# Patient Record
Sex: FEMALE | Race: WHITE | Employment: OTHER | URBAN - METROPOLITAN AREA
[De-identification: names, ages, dates, MRNs, and addresses within clinical notes are randomized per-mention and may not be internally consistent; named-entity substitution may affect disease eponyms.]

---

## 2017-01-23 ENCOUNTER — OFFICE VISIT (OUTPATIENT)
Dept: FAMILY MEDICINE CLINIC | Age: 62
End: 2017-01-23

## 2017-01-23 VITALS
WEIGHT: 279.6 LBS | DIASTOLIC BLOOD PRESSURE: 64 MMHG | BODY MASS INDEX: 46.58 KG/M2 | HEIGHT: 65 IN | RESPIRATION RATE: 16 BRPM | HEART RATE: 60 BPM | SYSTOLIC BLOOD PRESSURE: 112 MMHG

## 2017-01-23 DIAGNOSIS — E78.5 HYPERLIPIDEMIA LDL GOAL <70: ICD-10-CM

## 2017-01-23 DIAGNOSIS — E03.9 HYPOTHYROIDISM, UNSPECIFIED TYPE: ICD-10-CM

## 2017-01-23 DIAGNOSIS — E66.01 MORBID OBESITY WITH BMI OF 45.0-49.9, ADULT (HCC): ICD-10-CM

## 2017-01-23 DIAGNOSIS — R06.02 SOB (SHORTNESS OF BREATH) ON EXERTION: ICD-10-CM

## 2017-01-23 DIAGNOSIS — E66.01 MORBID OBESITY, UNSPECIFIED OBESITY TYPE (HCC): ICD-10-CM

## 2017-01-23 DIAGNOSIS — R73.01 IFG (IMPAIRED FASTING GLUCOSE): ICD-10-CM

## 2017-01-23 DIAGNOSIS — E55.9 VITAMIN D DEFICIENCY: ICD-10-CM

## 2017-01-23 DIAGNOSIS — M51.36 DDD (DEGENERATIVE DISC DISEASE), LUMBAR: ICD-10-CM

## 2017-01-23 DIAGNOSIS — I25.10 CORONARY ARTERY DISEASE INVOLVING NATIVE CORONARY ARTERY OF NATIVE HEART WITHOUT ANGINA PECTORIS: Primary | ICD-10-CM

## 2017-01-23 DIAGNOSIS — Z11.59 NEED FOR HEPATITIS C SCREENING TEST: ICD-10-CM

## 2017-01-23 DIAGNOSIS — M25.50 POLYARTHRALGIA: ICD-10-CM

## 2017-01-23 DIAGNOSIS — K21.9 GASTROESOPHAGEAL REFLUX DISEASE, ESOPHAGITIS PRESENCE NOT SPECIFIED: ICD-10-CM

## 2017-01-23 DIAGNOSIS — K58.9 IRRITABLE BOWEL SYNDROME, UNSPECIFIED TYPE: ICD-10-CM

## 2017-01-23 DIAGNOSIS — K27.9 PUD (PEPTIC ULCER DISEASE): ICD-10-CM

## 2017-01-23 PROCEDURE — 3017F COLORECTAL CA SCREEN DOC REV: CPT | Performed by: FAMILY MEDICINE

## 2017-01-23 PROCEDURE — 1036F TOBACCO NON-USER: CPT | Performed by: FAMILY MEDICINE

## 2017-01-23 PROCEDURE — G8419 CALC BMI OUT NRM PARAM NOF/U: HCPCS | Performed by: FAMILY MEDICINE

## 2017-01-23 PROCEDURE — 99214 OFFICE O/P EST MOD 30 MIN: CPT | Performed by: FAMILY MEDICINE

## 2017-01-23 PROCEDURE — 3014F SCREEN MAMMO DOC REV: CPT | Performed by: FAMILY MEDICINE

## 2017-01-23 PROCEDURE — G8427 DOCREV CUR MEDS BY ELIG CLIN: HCPCS | Performed by: FAMILY MEDICINE

## 2017-01-23 PROCEDURE — G8484 FLU IMMUNIZE NO ADMIN: HCPCS | Performed by: FAMILY MEDICINE

## 2017-01-23 PROCEDURE — G8598 ASA/ANTIPLAT THER USED: HCPCS | Performed by: FAMILY MEDICINE

## 2017-01-23 RX ORDER — IBUPROFEN 600 MG/1
TABLET ORAL
Qty: 120 TABLET | Refills: 0 | Status: SHIPPED | OUTPATIENT
Start: 2017-01-23 | End: 2017-03-13 | Stop reason: SDUPTHER

## 2017-01-23 RX ORDER — OMEPRAZOLE 40 MG/1
CAPSULE, DELAYED RELEASE ORAL
Qty: 90 CAPSULE | Refills: 3 | Status: SHIPPED | OUTPATIENT
Start: 2017-01-23 | End: 2018-08-24 | Stop reason: SDUPTHER

## 2017-01-23 RX ORDER — LEVOTHYROXINE SODIUM 0.05 MG/1
TABLET ORAL
Qty: 90 TABLET | Refills: 3 | Status: SHIPPED | OUTPATIENT
Start: 2017-01-23 | End: 2019-08-05

## 2017-01-23 RX ORDER — ATORVASTATIN CALCIUM 40 MG/1
40 TABLET, FILM COATED ORAL DAILY
Qty: 90 TABLET | Refills: 3 | Status: SHIPPED | OUTPATIENT
Start: 2017-01-23 | End: 2018-03-17 | Stop reason: SDUPTHER

## 2017-01-23 ASSESSMENT — ENCOUNTER SYMPTOMS
GASTROINTESTINAL NEGATIVE: 1
BACK PAIN: 1
RESPIRATORY NEGATIVE: 1

## 2017-01-24 ENCOUNTER — TELEPHONE (OUTPATIENT)
Dept: FAMILY MEDICINE CLINIC | Age: 62
End: 2017-01-24

## 2017-01-24 RX ORDER — METHYLPREDNISOLONE 4 MG/1
TABLET ORAL
Qty: 1 KIT | Refills: 0 | Status: SHIPPED | OUTPATIENT
Start: 2017-01-24 | End: 2017-01-30

## 2017-01-30 LAB
ABSOLUTE BASO #: 0 K/UL (ref 0–0.1)
ABSOLUTE EOS #: 0.1 K/UL (ref 0.1–0.4)
ABSOLUTE LYMPH #: 2.6 K/UL (ref 0.8–5.2)
ABSOLUTE MONO #: 0.4 K/UL (ref 0.1–0.9)
ABSOLUTE NEUT #: 2.9 K/UL (ref 1.3–9.1)
ALBUMIN SERPL-MCNC: 3.9 G/DL (ref 3.2–5.3)
ALK PHOSPHATASE: 91 IU/L (ref 35–121)
ALT SERPL-CCNC: 22 IU/L (ref 5–59)
ANION GAP SERPL CALCULATED.3IONS-SCNC: 18 MMOL/L
AST SERPL-CCNC: 21 IU/L (ref 10–42)
BASOPHILS RELATIVE PERCENT: 0.7 % (ref 0–1)
BILIRUB SERPL-MCNC: 0.5 MG/DL (ref 0.2–1.3)
BUN BLDV-MCNC: 15 MG/DL (ref 10–20)
CALCIUM SERPL-MCNC: 9.2 MG/DL (ref 8.7–10.8)
CHLORIDE BLD-SCNC: 102 MMOL/L (ref 95–111)
CHOLESTEROL/HDL RATIO: 2.6
CHOLESTEROL: 157 MG/DL
CO2: 29 MMOL/L (ref 21–32)
CREAT SERPL-MCNC: 0.6 MG/DL (ref 0.5–1.3)
EGFR AFRICAN AMERICAN: 122
EGFR IF NONAFRICAN AMERICAN: 101
EOSINOPHILS RELATIVE PERCENT: 1.8 % (ref 1–4)
GLUCOSE: 86 MG/DL (ref 70–100)
HCT VFR BLD CALC: 42.4 % (ref 36–48)
HDLC SERPL-MCNC: 60 MG/DL (ref 40–60)
HEMOGLOBIN: 13.8 G/DL (ref 12–16)
LDL CHOLESTEROL CALCULATED: 81 MG/DL
LDL/HDL RATIO: 1.4
LYMPHOCYTE %: 42.9 % (ref 16–48)
MAGNESIUM: 2.1 MG/DL (ref 1.7–2.4)
MCH RBC QN AUTO: 28 PG (ref 27–34)
MCHC RBC AUTO-ENTMCNC: 32.5 G/DL (ref 31–36)
MCV RBC AUTO: 86.2 FL (ref 80–100)
MONOCYTES # BLD: 7.2 % (ref 1–8)
NEUTROPHILS RELATIVE PERCENT: 47.2 % (ref 45–75)
PDW BLD-RTO: 12.6 % (ref 10.8–14.8)
PLATELETS: 347 K/UL (ref 150–450)
POTASSIUM SERPL-SCNC: 4.2 MMOL/L (ref 3.5–5.4)
RBC: 4.92 M/UL (ref 4–5.5)
RHEUMATOID FACTOR: <11 IU/ML
SEDIMENTATION RATE, ERYTHROCYTE: 10 MM/HR (ref 0–30)
SODIUM BLD-SCNC: 145 MMOL/L (ref 134–147)
TOTAL PROTEIN: 7 G/DL (ref 5.8–8)
TRIGL SERPL-MCNC: 78 MG/DL
VLDLC SERPL CALC-MCNC: 16 MG/DL
WBC: 6.1 K/UL (ref 3.7–10.8)

## 2017-01-31 LAB
ANTI-NUCLEAR ANTIBODY (ANA): NORMAL
AVERAGE GLUCOSE: 105 MG/DL (ref 66–114)
HBA1C MFR BLD: 5.3 % (ref 4.2–5.8)
HEPATITIS C ANTIBODY: NEGATIVE
TSH SERPL DL<=0.05 MIU/L-ACNC: 3.98 UIU/ML (ref 0.4–4.4)

## 2017-02-01 LAB
CCP IGG ANTIBODIES: 3 UNITS
HIGH SENSITIVE C-REACTIVE PROTEIN: 2.6 MG/L
HLA B27: NORMAL
VITAMIN D 25-HYDROXY: 14 NG/ML

## 2017-02-20 ENCOUNTER — OFFICE VISIT (OUTPATIENT)
Dept: FAMILY MEDICINE CLINIC | Age: 62
End: 2017-02-20

## 2017-02-20 VITALS
DIASTOLIC BLOOD PRESSURE: 90 MMHG | RESPIRATION RATE: 20 BRPM | SYSTOLIC BLOOD PRESSURE: 132 MMHG | TEMPERATURE: 97.3 F | OXYGEN SATURATION: 97 % | HEIGHT: 64 IN | HEART RATE: 84 BPM | BODY MASS INDEX: 47.12 KG/M2 | WEIGHT: 276 LBS

## 2017-02-20 DIAGNOSIS — B34.9 VIRAL ILLNESS: Primary | ICD-10-CM

## 2017-02-20 DIAGNOSIS — Z20.828 EXPOSURE TO INFLUENZA: ICD-10-CM

## 2017-02-20 PROCEDURE — G8598 ASA/ANTIPLAT THER USED: HCPCS | Performed by: FAMILY MEDICINE

## 2017-02-20 PROCEDURE — G8484 FLU IMMUNIZE NO ADMIN: HCPCS | Performed by: FAMILY MEDICINE

## 2017-02-20 PROCEDURE — 1036F TOBACCO NON-USER: CPT | Performed by: FAMILY MEDICINE

## 2017-02-20 PROCEDURE — 99213 OFFICE O/P EST LOW 20 MIN: CPT | Performed by: FAMILY MEDICINE

## 2017-02-20 PROCEDURE — 3014F SCREEN MAMMO DOC REV: CPT | Performed by: FAMILY MEDICINE

## 2017-02-20 PROCEDURE — 3017F COLORECTAL CA SCREEN DOC REV: CPT | Performed by: FAMILY MEDICINE

## 2017-02-20 PROCEDURE — G8427 DOCREV CUR MEDS BY ELIG CLIN: HCPCS | Performed by: FAMILY MEDICINE

## 2017-02-20 PROCEDURE — G8417 CALC BMI ABV UP PARAM F/U: HCPCS | Performed by: FAMILY MEDICINE

## 2017-02-20 RX ORDER — OSELTAMIVIR PHOSPHATE 75 MG/1
75 CAPSULE ORAL 2 TIMES DAILY
Qty: 10 CAPSULE | Refills: 0 | Status: SHIPPED | OUTPATIENT
Start: 2017-02-20 | End: 2017-02-25

## 2017-02-20 ASSESSMENT — ENCOUNTER SYMPTOMS
GASTROINTESTINAL NEGATIVE: 1
RHINORRHEA: 1
COUGH: 1
SINUS PRESSURE: 1
SHORTNESS OF BREATH: 0
WHEEZING: 0

## 2017-02-21 ENCOUNTER — TELEPHONE (OUTPATIENT)
Dept: FAMILY MEDICINE CLINIC | Age: 62
End: 2017-02-21

## 2017-03-13 DIAGNOSIS — M25.50 POLYARTHRALGIA: ICD-10-CM

## 2017-03-13 RX ORDER — IBUPROFEN 600 MG/1
TABLET ORAL
Qty: 120 TABLET | Refills: 0 | Status: SHIPPED | OUTPATIENT
Start: 2017-03-13 | End: 2017-04-10 | Stop reason: SDUPTHER

## 2017-04-10 DIAGNOSIS — M25.50 POLYARTHRALGIA: ICD-10-CM

## 2017-04-11 RX ORDER — IBUPROFEN 600 MG/1
TABLET ORAL
Qty: 120 TABLET | Refills: 0 | Status: SHIPPED | OUTPATIENT
Start: 2017-04-11 | End: 2017-06-02 | Stop reason: SDUPTHER

## 2017-04-27 ENCOUNTER — TELEPHONE (OUTPATIENT)
Dept: FAMILY MEDICINE CLINIC | Age: 62
End: 2017-04-27

## 2017-06-02 DIAGNOSIS — M25.50 POLYARTHRALGIA: ICD-10-CM

## 2017-06-04 RX ORDER — IBUPROFEN 600 MG/1
TABLET ORAL
Qty: 360 TABLET | Refills: 0 | Status: SHIPPED | OUTPATIENT
Start: 2017-06-04 | End: 2017-11-03 | Stop reason: SDUPTHER

## 2017-07-16 ENCOUNTER — NURSE TRIAGE (OUTPATIENT)
Dept: ADMINISTRATIVE | Age: 62
End: 2017-07-16

## 2017-09-19 ENCOUNTER — TELEPHONE (OUTPATIENT)
Dept: FAMILY MEDICINE CLINIC | Age: 62
End: 2017-09-19

## 2017-11-03 DIAGNOSIS — M25.50 POLYARTHRALGIA: ICD-10-CM

## 2017-11-06 RX ORDER — IBUPROFEN 600 MG/1
TABLET ORAL
Qty: 360 TABLET | Refills: 0 | Status: SHIPPED | OUTPATIENT
Start: 2017-11-06 | End: 2018-02-21 | Stop reason: SDUPTHER

## 2017-12-05 ENCOUNTER — OFFICE VISIT (OUTPATIENT)
Dept: FAMILY MEDICINE CLINIC | Age: 62
End: 2017-12-05
Payer: MEDICARE

## 2017-12-05 VITALS
RESPIRATION RATE: 16 BRPM | HEIGHT: 66 IN | WEIGHT: 274.9 LBS | DIASTOLIC BLOOD PRESSURE: 78 MMHG | SYSTOLIC BLOOD PRESSURE: 130 MMHG | HEART RATE: 78 BPM | OXYGEN SATURATION: 94 % | TEMPERATURE: 98.3 F | BODY MASS INDEX: 44.18 KG/M2

## 2017-12-05 DIAGNOSIS — J40 BRONCHITIS: Primary | ICD-10-CM

## 2017-12-05 DIAGNOSIS — R05.9 COUGH: ICD-10-CM

## 2017-12-05 DIAGNOSIS — Z12.31 ENCOUNTER FOR SCREENING MAMMOGRAM FOR BREAST CANCER: ICD-10-CM

## 2017-12-05 PROCEDURE — G8417 CALC BMI ABV UP PARAM F/U: HCPCS | Performed by: NURSE PRACTITIONER

## 2017-12-05 PROCEDURE — 3017F COLORECTAL CA SCREEN DOC REV: CPT | Performed by: NURSE PRACTITIONER

## 2017-12-05 PROCEDURE — G8484 FLU IMMUNIZE NO ADMIN: HCPCS | Performed by: NURSE PRACTITIONER

## 2017-12-05 PROCEDURE — 3014F SCREEN MAMMO DOC REV: CPT | Performed by: NURSE PRACTITIONER

## 2017-12-05 PROCEDURE — G8598 ASA/ANTIPLAT THER USED: HCPCS | Performed by: NURSE PRACTITIONER

## 2017-12-05 PROCEDURE — 1036F TOBACCO NON-USER: CPT | Performed by: NURSE PRACTITIONER

## 2017-12-05 PROCEDURE — G8427 DOCREV CUR MEDS BY ELIG CLIN: HCPCS | Performed by: NURSE PRACTITIONER

## 2017-12-05 PROCEDURE — 99213 OFFICE O/P EST LOW 20 MIN: CPT | Performed by: NURSE PRACTITIONER

## 2017-12-05 RX ORDER — ALBUTEROL SULFATE 90 UG/1
2 AEROSOL, METERED RESPIRATORY (INHALATION) EVERY 6 HOURS PRN
Qty: 1 INHALER | Refills: 0 | Status: SHIPPED | OUTPATIENT
Start: 2017-12-05 | End: 2018-03-28

## 2017-12-05 RX ORDER — PREDNISONE 50 MG/1
50 TABLET ORAL DAILY
Qty: 4 TABLET | Refills: 0 | Status: SHIPPED | OUTPATIENT
Start: 2017-12-05 | End: 2017-12-09

## 2017-12-05 RX ORDER — AZITHROMYCIN 250 MG/1
TABLET, FILM COATED ORAL
Qty: 6 TABLET | Refills: 0 | Status: SHIPPED | OUTPATIENT
Start: 2017-12-05 | End: 2017-12-15

## 2017-12-05 ASSESSMENT — ENCOUNTER SYMPTOMS
ABDOMINAL PAIN: 0
COUGH: 1
SINUS PAIN: 0
CHEST TIGHTNESS: 1
NAUSEA: 0
SHORTNESS OF BREATH: 1
RHINORRHEA: 1
WHEEZING: 1
SORE THROAT: 1
SINUS PRESSURE: 0

## 2017-12-05 ASSESSMENT — PATIENT HEALTH QUESTIONNAIRE - PHQ9
SUM OF ALL RESPONSES TO PHQ9 QUESTIONS 1 & 2: 0
1. LITTLE INTEREST OR PLEASURE IN DOING THINGS: 0
2. FEELING DOWN, DEPRESSED OR HOPELESS: 0
SUM OF ALL RESPONSES TO PHQ QUESTIONS 1-9: 0

## 2017-12-05 NOTE — PROGRESS NOTES
Subjective:      Patient ID: Elysia De La Vega is a 60 y.o. female. HPI: Acute for cough    Chief Complaint   Patient presents with    Cough     s/s x 5 days    Chest Congestion    Otalgia    Fatigue       Onset of 5 days with above symptoms. Complains of SOB and chest tightness. No fever. Cough is dry and tight. No hx of smoking    No flu shot. Unknown exposure to illness. Vitals:    12/05/17 1046   BP: 130/78   Pulse: 78   Resp: 16   Temp: 98.3 °F (36.8 °C)   SpO2: 94%       Patient Active Problem List   Diagnosis    CAD (coronary artery disease) 7/2014- mid lad 50 % patent LM< LCX and RCA    Hyperlipidemia LDL goal <70    Hypothyroid    History of total thyroidectomy    GERD (gastroesophageal reflux disease)    PUD (peptic ulcer disease)    IBS (irritable bowel syndrome)    IFG (impaired fasting glucose)    Vitamin D deficiency    SOB (shortness of breath) on exertion    Chest pain    Heart palpitations- intermittent    Abnormal nuclear stress test- anteroior and anteroseptal ischemia    Chest pain, atypical    Abdominal pain       Review of Systems   Constitutional: Positive for fatigue. Negative for chills and fever. HENT: Positive for congestion, ear pain (pressure), postnasal drip, rhinorrhea and sore throat. Negative for sinus pain and sinus pressure. Respiratory: Positive for cough, chest tightness, shortness of breath and wheezing. Cardiovascular: Negative for chest pain. Gastrointestinal: Negative for abdominal pain and nausea. Skin: Negative for rash. Neurological: Negative for dizziness, light-headedness and headaches. Psychiatric/Behavioral: Negative. Objective:   Physical Exam   Constitutional: She is oriented to person, place, and time. Vital signs are normal. She appears well-developed and well-nourished. She is active. She does not have a sickly appearance. No distress.    HENT:   Right Ear: Tympanic membrane normal.   Left Ear: Tympanic membrane normal.   Nose: Nose normal.   Mouth/Throat: Oropharynx is clear and moist and mucous membranes are normal.   Cardiovascular: Normal rate, regular rhythm, S1 normal, S2 normal, normal heart sounds and normal pulses. Exam reveals no S3. No murmur heard. Pulmonary/Chest: Effort normal. No accessory muscle usage. Tachypnea noted. No respiratory distress. She has no decreased breath sounds. She has wheezes in the right upper field, the right lower field, the left upper field and the left lower field. She has no rhonchi. Abdominal: Soft. Bowel sounds are normal. There is no tenderness. Neurological: She is alert and oriented to person, place, and time. Psychiatric: She has a normal mood and affect. Her behavior is normal.       Assessment:      1. Bronchitis  predniSONE (DELTASONE) 50 MG tablet    azithromycin (ZITHROMAX Z-ALEJANDRA) 250 MG tablet    albuterol sulfate HFA (VENTOLIN HFA) 108 (90 Base) MCG/ACT inhaler   2. Cough  predniSONE (DELTASONE) 50 MG tablet    albuterol sulfate HFA (VENTOLIN HFA) 108 (90 Base) MCG/ACT inhaler   3.  Encounter for screening mammogram for breast cancer  [facility] Digital Screen Bilateral [[id number]]           Plan:      Orders as above  Fluids and rest  MAMMO ordered  RTO if symptoms worsen or stay the same

## 2017-12-05 NOTE — PROGRESS NOTES
Visit Information    Have you changed or started any medications since your last visit including any over-the-counter medicines, vitamins, or herbal medicines? no   Are you having any side effects from any of your medications? -  no  Have you stopped taking any of your medications? Is so, why? -  no    Have you seen any other physician or provider since your last visit? No  Have you had any other diagnostic tests since your last visit? No  Have you been seen in the emergency room and/or had an admission to a hospital since we last saw you? No  Have you had your routine dental cleaning in the past 6 months? no    Have you activated your [employer] account? If not, what are your barriers?  Yes     Patient Care Team:  Shaheed White DO as PCP - General (Family Medicine)  Shaheed White DO as PCP - S Attributed Provider    Medical History Review  Past Medical, Family, and Social History reviewed and does not contribute to the patient presenting condition    Health Maintenance   Topic Date Due    HIV screen  01/17/1969    DTaP/Tdap/Td vaccine (1 - Tdap) 01/17/1973    Zostavax vaccine  01/17/2014    Breast cancer screen  01/13/2017    Flu vaccine (1) 09/01/2018 (Originally 9/1/2017)    Cervical cancer screen  01/27/2018    Colon cancer screen colonoscopy  08/01/2020    Lipid screen  01/30/2022    Hepatitis C screen  Completed

## 2018-02-21 DIAGNOSIS — M25.50 POLYARTHRALGIA: ICD-10-CM

## 2018-02-22 ENCOUNTER — HOSPITAL ENCOUNTER (EMERGENCY)
Age: 63
Discharge: HOME | End: 2018-02-22
Attending: EMERGENCY MEDICINE
Payer: MEDICARE

## 2018-02-22 ENCOUNTER — APPOINTMENT (OUTPATIENT)
Dept: GENERAL RADIOLOGY | Age: 63
End: 2018-02-22
Payer: MEDICARE

## 2018-02-22 VITALS
TEMPERATURE: 97.5 F | SYSTOLIC BLOOD PRESSURE: 158 MMHG | RESPIRATION RATE: 17 BRPM | WEIGHT: 270 LBS | HEIGHT: 66 IN | OXYGEN SATURATION: 99 % | DIASTOLIC BLOOD PRESSURE: 79 MMHG | BODY MASS INDEX: 43.39 KG/M2 | HEART RATE: 67 BPM

## 2018-02-22 DIAGNOSIS — H81.10 BENIGN PAROXYSMAL POSITIONAL VERTIGO, UNSPECIFIED LATERALITY: ICD-10-CM

## 2018-02-22 DIAGNOSIS — R11.2 NON-INTRACTABLE VOMITING WITH NAUSEA, UNSPECIFIED VOMITING TYPE: Primary | ICD-10-CM

## 2018-02-22 LAB
AMPHETAMINE+METHAMPHETAMINE URINE SCREEN: NEGATIVE
ANION GAP SERPL CALCULATED.3IONS-SCNC: 11 MEQ/L (ref 8–16)
BACTERIA: ABNORMAL
BARBITURATE QUANTITATIVE URINE: NEGATIVE
BASOPHILS # BLD: 0.7 %
BASOPHILS ABSOLUTE: 0 THOU/MM3 (ref 0–0.1)
BENZODIAZEPINE QUANTITATIVE URINE: NEGATIVE
BILIRUBIN URINE: NEGATIVE
BLOOD, URINE: ABNORMAL
BUN BLDV-MCNC: 13 MG/DL (ref 7–22)
CALCIUM SERPL-MCNC: 8.8 MG/DL (ref 8.5–10.5)
CANNABINOID QUANTITATIVE URINE: NEGATIVE
CASTS: ABNORMAL /LPF
CASTS: ABNORMAL /LPF
CHARACTER, URINE: CLEAR
CHLORIDE BLD-SCNC: 102 MEQ/L (ref 98–111)
CO2: 26 MEQ/L (ref 23–33)
COCAINE METABOLITE QUANTITATIVE URINE: NEGATIVE
COLOR: YELLOW
CREAT SERPL-MCNC: 0.5 MG/DL (ref 0.4–1.2)
CRYSTALS: ABNORMAL
EKG ATRIAL RATE: 77 BPM
EKG P AXIS: 48 DEGREES
EKG P-R INTERVAL: 190 MS
EKG Q-T INTERVAL: 392 MS
EKG QRS DURATION: 100 MS
EKG QTC CALCULATION (BAZETT): 443 MS
EKG R AXIS: 18 DEGREES
EKG T AXIS: 60 DEGREES
EKG VENTRICULAR RATE: 77 BPM
EOSINOPHIL # BLD: 3.2 %
EOSINOPHILS ABSOLUTE: 0.2 THOU/MM3 (ref 0–0.4)
EPITHELIAL CELLS, UA: ABNORMAL /HPF
FLU A ANTIGEN: NEGATIVE
FLU B ANTIGEN: NEGATIVE
GFR SERPL CREATININE-BSD FRML MDRD: > 90 ML/MIN/1.73M2
GLUCOSE BLD-MCNC: 115 MG/DL (ref 70–108)
GLUCOSE, URINE: NEGATIVE MG/DL
HCT VFR BLD CALC: 41.5 % (ref 37–47)
HEMOGLOBIN: 13.9 GM/DL (ref 12–16)
KETONES, URINE: NEGATIVE
LEUKOCYTE ESTERASE, URINE: NEGATIVE
LYMPHOCYTES # BLD: 32.5 %
LYMPHOCYTES ABSOLUTE: 2 THOU/MM3 (ref 1–4.8)
MCH RBC QN AUTO: 28.7 PG (ref 27–31)
MCHC RBC AUTO-ENTMCNC: 33.4 GM/DL (ref 33–37)
MCV RBC AUTO: 85.9 FL (ref 81–99)
MISCELLANEOUS LAB TEST RESULT: ABNORMAL
MONOCYTES # BLD: 6.1 %
MONOCYTES ABSOLUTE: 0.4 THOU/MM3 (ref 0.4–1.3)
NITRITE, URINE: NEGATIVE
NUCLEATED RED BLOOD CELLS: 0 /100 WBC
OPIATES, URINE: NEGATIVE
OSMOLALITY CALCULATION: 278.6 MOSMOL/KG (ref 275–300)
OXYCODONE: NEGATIVE
PDW BLD-RTO: 14.1 % (ref 11.5–14.5)
PH UA: 6
PHENCYCLIDINE QUANTITATIVE URINE: NEGATIVE
PLATELET # BLD: 337 THOU/MM3 (ref 130–400)
PMV BLD AUTO: 7.8 FL (ref 7.4–10.4)
POTASSIUM SERPL-SCNC: 3.7 MEQ/L (ref 3.5–5.2)
PROTEIN UA: NEGATIVE MG/DL
RBC # BLD: 4.83 MILL/MM3 (ref 4.2–5.4)
RBC URINE: ABNORMAL /HPF
RENAL EPITHELIAL, UA: ABNORMAL
SEG NEUTROPHILS: 57.5 %
SEGMENTED NEUTROPHILS ABSOLUTE COUNT: 3.6 THOU/MM3 (ref 1.8–7.7)
SODIUM BLD-SCNC: 139 MEQ/L (ref 135–145)
SPECIFIC GRAVITY UA: 1.01 (ref 1–1.03)
TROPONIN T: < 0.01 NG/ML
TROPONIN T: < 0.01 NG/ML
TSH SERPL DL<=0.05 MIU/L-ACNC: 3.66 UIU/ML (ref 0.4–4.2)
UROBILINOGEN, URINE: 0.2 EU/DL
WBC # BLD: 6.2 THOU/MM3 (ref 4.8–10.8)
WBC UA: ABNORMAL /HPF
YEAST: ABNORMAL

## 2018-02-22 PROCEDURE — 81001 URINALYSIS AUTO W/SCOPE: CPT

## 2018-02-22 PROCEDURE — 93005 ELECTROCARDIOGRAM TRACING: CPT | Performed by: EMERGENCY MEDICINE

## 2018-02-22 PROCEDURE — 80307 DRUG TEST PRSMV CHEM ANLYZR: CPT

## 2018-02-22 PROCEDURE — 36415 COLL VENOUS BLD VENIPUNCTURE: CPT

## 2018-02-22 PROCEDURE — 99284 EMERGENCY DEPT VISIT MOD MDM: CPT

## 2018-02-22 PROCEDURE — 80048 BASIC METABOLIC PNL TOTAL CA: CPT

## 2018-02-22 PROCEDURE — 96375 TX/PRO/DX INJ NEW DRUG ADDON: CPT

## 2018-02-22 PROCEDURE — 84484 ASSAY OF TROPONIN QUANT: CPT

## 2018-02-22 PROCEDURE — 6360000002 HC RX W HCPCS: Performed by: EMERGENCY MEDICINE

## 2018-02-22 PROCEDURE — 84443 ASSAY THYROID STIM HORMONE: CPT

## 2018-02-22 PROCEDURE — 74022 RADEX COMPL AQT ABD SERIES: CPT

## 2018-02-22 PROCEDURE — 85025 COMPLETE CBC W/AUTO DIFF WBC: CPT

## 2018-02-22 PROCEDURE — 2580000003 HC RX 258: Performed by: EMERGENCY MEDICINE

## 2018-02-22 PROCEDURE — 87804 INFLUENZA ASSAY W/OPTIC: CPT

## 2018-02-22 PROCEDURE — 96374 THER/PROPH/DIAG INJ IV PUSH: CPT

## 2018-02-22 RX ORDER — 0.9 % SODIUM CHLORIDE 0.9 %
500 INTRAVENOUS SOLUTION INTRAVENOUS ONCE
Status: COMPLETED | OUTPATIENT
Start: 2018-02-22 | End: 2018-02-22

## 2018-02-22 RX ORDER — IBUPROFEN 600 MG/1
TABLET ORAL
Qty: 360 TABLET | Refills: 0 | Status: SHIPPED | OUTPATIENT
Start: 2018-02-22 | End: 2018-09-19 | Stop reason: SDUPTHER

## 2018-02-22 RX ORDER — DIPHENHYDRAMINE HYDROCHLORIDE 50 MG/ML
25 INJECTION INTRAMUSCULAR; INTRAVENOUS ONCE
Status: COMPLETED | OUTPATIENT
Start: 2018-02-22 | End: 2018-02-22

## 2018-02-22 RX ORDER — KETOROLAC TROMETHAMINE 30 MG/ML
15 INJECTION, SOLUTION INTRAMUSCULAR; INTRAVENOUS ONCE
Status: COMPLETED | OUTPATIENT
Start: 2018-02-22 | End: 2018-02-22

## 2018-02-22 RX ORDER — METOCLOPRAMIDE 10 MG/1
10 TABLET ORAL EVERY 8 HOURS PRN
Qty: 6 TABLET | Refills: 0 | Status: SHIPPED | OUTPATIENT
Start: 2018-02-22 | End: 2018-03-28 | Stop reason: ALTCHOICE

## 2018-02-22 RX ORDER — METOCLOPRAMIDE HYDROCHLORIDE 5 MG/ML
10 INJECTION INTRAMUSCULAR; INTRAVENOUS ONCE
Status: COMPLETED | OUTPATIENT
Start: 2018-02-22 | End: 2018-02-22

## 2018-02-22 RX ORDER — MECLIZINE HCL 12.5 MG/1
12.5 TABLET ORAL 3 TIMES DAILY PRN
Qty: 15 TABLET | Refills: 0 | Status: SHIPPED | OUTPATIENT
Start: 2018-02-22 | End: 2018-03-04

## 2018-02-22 RX ADMIN — METOCLOPRAMIDE 10 MG: 5 INJECTION, SOLUTION INTRAMUSCULAR; INTRAVENOUS at 10:27

## 2018-02-22 RX ADMIN — KETOROLAC TROMETHAMINE 15 MG: 30 INJECTION, SOLUTION INTRAMUSCULAR at 10:27

## 2018-02-22 RX ADMIN — DIPHENHYDRAMINE HYDROCHLORIDE 25 MG: 50 INJECTION, SOLUTION INTRAMUSCULAR; INTRAVENOUS at 10:27

## 2018-02-22 RX ADMIN — SODIUM CHLORIDE 500 ML: 9 INJECTION, SOLUTION INTRAVENOUS at 10:25

## 2018-02-22 ASSESSMENT — PAIN SCALES - GENERAL
PAINLEVEL_OUTOF10: 2
PAINLEVEL_OUTOF10: 1
PAINLEVEL_OUTOF10: 3

## 2018-02-22 ASSESSMENT — PAIN DESCRIPTION - LOCATION: LOCATION: HEAD

## 2018-02-22 ASSESSMENT — PAIN DESCRIPTION - FREQUENCY: FREQUENCY: INTERMITTENT

## 2018-02-22 ASSESSMENT — PAIN DESCRIPTION - ORIENTATION: ORIENTATION: RIGHT;LEFT

## 2018-02-22 ASSESSMENT — PAIN DESCRIPTION - DESCRIPTORS: DESCRIPTORS: ACHING

## 2018-02-22 ASSESSMENT — PAIN DESCRIPTION - PAIN TYPE: TYPE: ACUTE PAIN

## 2018-02-22 NOTE — ED NOTES
Lying in bed resting, family at bedside. No concerns voiced at this time.      Faustino Hatch RN  02/22/18 [id number]

## 2018-02-22 NOTE — ED PROVIDER NOTES
PAIN    LEVOTHYROXINE (SYNTHROID) 50 MCG TABLET    TAKE 1 TABLET EVERY MORNING WITH WATER ON AN EMPTY STOMACH- WAIT 30 MIN BEFORE EAT/TAKE OTHER MEDICATIONS    METOPROLOL TARTRATE (LOPRESSOR) 25 MG TABLET    TAKE ONE-HALF TABLET BY MOUTH TWICE DAILY    OMEPRAZOLE (PRILOSEC) 40 MG DELAYED RELEASE CAPSULE    TAKE ONE CAPSULE BY MOUTH DAILY WITH BREAKFAST       ALLERGIES     has No Known Allergies. FAMILY HISTORY     indicated that her mother is . She indicated that her father is . She indicated that her brother is alive. She indicated that the status of her maternal grandmother is unknown. She indicated that the status of her maternal grandfather is unknown. She indicated that the status of her paternal grandmother is unknown. She indicated that the status of her paternal grandfather is unknown.    family history includes Arthritis in her mother; Diabetes in her brother; Other in her brother, father, maternal grandfather, maternal grandmother, mother, paternal grandfather, and paternal grandmother. SOCIAL HISTORY      reports that she has never smoked. She has never used smokeless tobacco. She reports that she does not drink alcohol or use drugs. PHYSICAL EXAM     INITIAL VITALS:  height is 5' 6\" (1.676 m) and weight is 270 lb (122.5 kg). Her oral temperature is 97.5 °F (36.4 °C). Her blood pressure is 203/98 (abnormal) and her pulse is 76. Her respiration is 18 and oxygen saturation is 98%. Physical Exam   Constitutional: She is oriented to person, place, and time. She appears well-developed and well-nourished. HENT:   Head: Normocephalic and atraumatic. Eyes: EOM are normal. Pupils are equal, round, and reactive to light. Neck: Normal range of motion. Neck supple. Cardiovascular: Normal rate and regular rhythm. Exam reveals no gallop and no friction rub. No murmur heard. Pulmonary/Chest: Effort normal and breath sounds normal.   Abdominal: Soft.  Bowel sounds are normal. She

## 2018-02-22 NOTE — ED NOTES
Presents with complaints of headache and emesis since this morning. States that she has had several emesis and just does not feel well. She denies any other complaints.      Betty Sosa RN  02/22/18 [id number]

## 2018-02-22 NOTE — CARE COORDINATION
Center   2/26/2018 10:45 AM Mackenzie Knox DO [address]       Review Due Health Maintenance:  Health Maintenance Due   Topic Date Due    HIV screen  01/17/1969    DTaP/Tdap/Td vaccine (1 - Tdap) 01/17/1973    Shingles Vaccine (1 of 2 - 2 Dose Series) 01/17/2004    Breast cancer screen  01/13/2017    A1C test (Diabetic or Prediabetic)  01/30/2018    TSH testing  01/30/2018    Cervical cancer screen  01/27/2018

## 2018-02-24 PROCEDURE — 93010 ELECTROCARDIOGRAM REPORT: CPT | Performed by: INTERNAL MEDICINE

## 2018-03-01 ENCOUNTER — OFFICE VISIT (OUTPATIENT)
Dept: FAMILY MEDICINE CLINIC | Age: 63
End: 2018-03-01
Payer: MEDICARE

## 2018-03-01 VITALS
SYSTOLIC BLOOD PRESSURE: 132 MMHG | HEART RATE: 72 BPM | BODY MASS INDEX: 47.58 KG/M2 | WEIGHT: 285.6 LBS | DIASTOLIC BLOOD PRESSURE: 70 MMHG | RESPIRATION RATE: 16 BRPM | HEIGHT: 65 IN

## 2018-03-01 DIAGNOSIS — R73.01 IFG (IMPAIRED FASTING GLUCOSE): ICD-10-CM

## 2018-03-01 DIAGNOSIS — E78.5 HYPERLIPIDEMIA LDL GOAL <70: ICD-10-CM

## 2018-03-01 DIAGNOSIS — N39.3 STRESS INCONTINENCE: ICD-10-CM

## 2018-03-01 DIAGNOSIS — K21.9 GASTROESOPHAGEAL REFLUX DISEASE, ESOPHAGITIS PRESENCE NOT SPECIFIED: ICD-10-CM

## 2018-03-01 DIAGNOSIS — R11.2 NAUSEA AND VOMITING, INTRACTABILITY OF VOMITING NOT SPECIFIED, UNSPECIFIED VOMITING TYPE: ICD-10-CM

## 2018-03-01 DIAGNOSIS — E03.9 HYPOTHYROIDISM, UNSPECIFIED TYPE: ICD-10-CM

## 2018-03-01 DIAGNOSIS — R42 DIZZINESS: Primary | ICD-10-CM

## 2018-03-01 DIAGNOSIS — I25.10 CORONARY ARTERY DISEASE INVOLVING NATIVE CORONARY ARTERY OF NATIVE HEART WITHOUT ANGINA PECTORIS: ICD-10-CM

## 2018-03-01 DIAGNOSIS — E66.01 MORBID OBESITY WITH BMI OF 45.0-49.9, ADULT (HCC): ICD-10-CM

## 2018-03-01 PROCEDURE — 3014F SCREEN MAMMO DOC REV: CPT | Performed by: FAMILY MEDICINE

## 2018-03-01 PROCEDURE — 1036F TOBACCO NON-USER: CPT | Performed by: FAMILY MEDICINE

## 2018-03-01 PROCEDURE — G8484 FLU IMMUNIZE NO ADMIN: HCPCS | Performed by: FAMILY MEDICINE

## 2018-03-01 PROCEDURE — G8427 DOCREV CUR MEDS BY ELIG CLIN: HCPCS | Performed by: FAMILY MEDICINE

## 2018-03-01 PROCEDURE — 3017F COLORECTAL CA SCREEN DOC REV: CPT | Performed by: FAMILY MEDICINE

## 2018-03-01 PROCEDURE — G8598 ASA/ANTIPLAT THER USED: HCPCS | Performed by: FAMILY MEDICINE

## 2018-03-01 PROCEDURE — 99214 OFFICE O/P EST MOD 30 MIN: CPT | Performed by: FAMILY MEDICINE

## 2018-03-01 PROCEDURE — G8417 CALC BMI ABV UP PARAM F/U: HCPCS | Performed by: FAMILY MEDICINE

## 2018-03-01 ASSESSMENT — ENCOUNTER SYMPTOMS
VOMITING: 0
NAUSEA: 0
GASTROINTESTINAL NEGATIVE: 1
RESPIRATORY NEGATIVE: 1

## 2018-03-01 NOTE — PROGRESS NOTES
GASTROINTESTINAL ENDOSCOPY  2009     Prior to Admission medications    Medication Sig Start Date End Date Taking? Authorizing Provider   ibuprofen (ADVIL;MOTRIN) 600 MG tablet TAKE 1 TABLET BY MOUTH EVERY 6 HOURS AS NEEDED FOR PAIN 2/22/18  Yes Po Cook DO   meclizine (ANTIVERT) 12.5 MG tablet Take 1 tablet by mouth 3 times daily as needed for Dizziness 2/22/18 3/4/18 Yes Marianne Lux DO   albuterol sulfate HFA (VENTOLIN HFA) 108 (90 Base) MCG/ACT inhaler Inhale 2 puffs into the lungs every 6 hours as needed for Wheezing 12/5/17  Yes Elsa Palma NP   Cholecalciferol (VITAMIN D PO) Take by mouth   Yes Historical Provider, MD   atorvastatin (LIPITOR) 40 MG tablet Take 1 tablet by mouth daily 1/23/17  Yes Po Cook DO   levothyroxine (SYNTHROID) 50 MCG tablet TAKE 1 TABLET EVERY MORNING WITH WATER ON AN EMPTY STOMACH- WAIT 30 MIN BEFORE EAT/TAKE OTHER MEDICATIONS 1/23/17  Yes Po Cook DO   metoprolol tartrate (LOPRESSOR) 25 MG tablet TAKE ONE-HALF TABLET BY MOUTH TWICE DAILY 1/23/17  Yes Po Cook DO   omeprazole (PRILOSEC) 40 MG delayed release capsule TAKE ONE CAPSULE BY MOUTH DAILY WITH BREAKFAST 1/23/17  Yes Po Cook DO   aspirin 81 MG tablet Take 81 mg by mouth daily   Yes Historical Provider, MD   metoclopramide (REGLAN) 10 MG tablet Take 1 tablet by mouth every 8 hours as needed (nausea) 2/22/18   Marianne Lux DO         Review of Systems   Constitutional: Negative. HENT: Negative. Respiratory: Negative. Cardiovascular: Negative. Gastrointestinal: Negative. Negative for nausea and vomiting. Genitourinary:        Stress incontinence   Musculoskeletal: Negative. Neurological: Negative for dizziness, light-headedness and headaches. All other systems reviewed and are negative. Objective:   Physical Exam   Constitutional: She is oriented to person, place, and time.  She appears well-developed and

## 2018-03-27 ENCOUNTER — TELEPHONE (OUTPATIENT)
Dept: FAMILY MEDICINE CLINIC | Age: 63
End: 2018-03-27

## 2018-03-28 ENCOUNTER — OFFICE VISIT (OUTPATIENT)
Dept: FAMILY MEDICINE CLINIC | Age: 63
End: 2018-03-28
Payer: MEDICARE

## 2018-03-28 VITALS
WEIGHT: 281.4 LBS | SYSTOLIC BLOOD PRESSURE: 138 MMHG | BODY MASS INDEX: 46.88 KG/M2 | HEART RATE: 56 BPM | TEMPERATURE: 97.3 F | DIASTOLIC BLOOD PRESSURE: 88 MMHG | HEIGHT: 65 IN | RESPIRATION RATE: 16 BRPM | OXYGEN SATURATION: 98 %

## 2018-03-28 DIAGNOSIS — H69.81 ETD (EUSTACHIAN TUBE DYSFUNCTION), RIGHT: ICD-10-CM

## 2018-03-28 DIAGNOSIS — R42 DIZZINESS: Primary | ICD-10-CM

## 2018-03-28 PROCEDURE — 99213 OFFICE O/P EST LOW 20 MIN: CPT | Performed by: FAMILY MEDICINE

## 2018-03-28 PROCEDURE — G8598 ASA/ANTIPLAT THER USED: HCPCS | Performed by: FAMILY MEDICINE

## 2018-03-28 PROCEDURE — 1036F TOBACCO NON-USER: CPT | Performed by: FAMILY MEDICINE

## 2018-03-28 PROCEDURE — G8484 FLU IMMUNIZE NO ADMIN: HCPCS | Performed by: FAMILY MEDICINE

## 2018-03-28 PROCEDURE — 3014F SCREEN MAMMO DOC REV: CPT | Performed by: FAMILY MEDICINE

## 2018-03-28 PROCEDURE — G8417 CALC BMI ABV UP PARAM F/U: HCPCS | Performed by: FAMILY MEDICINE

## 2018-03-28 PROCEDURE — 3017F COLORECTAL CA SCREEN DOC REV: CPT | Performed by: FAMILY MEDICINE

## 2018-03-28 PROCEDURE — G8427 DOCREV CUR MEDS BY ELIG CLIN: HCPCS | Performed by: FAMILY MEDICINE

## 2018-03-28 RX ORDER — MECLIZINE HYDROCHLORIDE 25 MG/1
25 TABLET ORAL 3 TIMES DAILY PRN
COMMUNITY
End: 2018-04-24 | Stop reason: ALTCHOICE

## 2018-03-28 ASSESSMENT — ENCOUNTER SYMPTOMS
GASTROINTESTINAL NEGATIVE: 1
RESPIRATORY NEGATIVE: 1

## 2018-03-28 NOTE — PROGRESS NOTES
Visit Information    Have you changed or started any medications since your last visit including any over-the-counter medicines, vitamins, or herbal medicines? no   Are you having any side effects from any of your medications? -  no  Have you stopped taking any of your medications? Is so, why? -  yes - medication list updated    Have you seen any other physician or provider since your last visit? No  Have you had any other diagnostic tests since your last visit? Yes - Records Requested  Have you been seen in the emergency room and/or had an admission to a hospital since we last saw you? No  Have you had your routine dental cleaning in the past 6 months? no    Have you activated your [email or web address] account? If not, what are your barriers? Yes     Patient Care Team:  Deanne Eden DO as PCP - General (Family Medicine)  Joshua Robert NP as PCP - S Attributed Provider  Chelita Argueta DPM as Consulting Physician (Podiatry)    Medical History Review  Past Medical, Family, and Social History reviewed and does not contribute to the patient presenting condition    Health Maintenance   Topic Date Due    HIV screen  01/17/1969    DTaP/Tdap/Td vaccine (1 - Tdap) 01/17/1973    Shingles Vaccine (1 of 2 - 2 Dose Series) 01/17/2004    Breast cancer screen  01/13/2017    Cervical cancer screen  01/27/2018    A1C test (Diabetic or Prediabetic)  01/30/2018    Flu vaccine (1) 09/01/2018 (Originally 9/1/2017)    TSH testing  02/22/2019    Colon cancer screen colonoscopy  08/01/2020    Lipid screen  01/30/2022    Hepatitis C screen  Completed       Patient scheduled for Audiometry w/tympanometry 4/4/18 @1pm [facility] Audiology Dept. Patient notified.

## 2018-03-28 NOTE — PROGRESS NOTES
BEFORE EAT/TAKE OTHER MEDICATIONS 1/23/17  Yes Patito Basurto, DO   metoprolol tartrate (LOPRESSOR) 25 MG tablet TAKE ONE-HALF TABLET BY MOUTH TWICE DAILY 1/23/17  Yes Patito Basurto, DO   omeprazole (PRILOSEC) 40 MG delayed release capsule TAKE ONE CAPSULE BY MOUTH DAILY WITH BREAKFAST 1/23/17  Yes Patito Basurto, DO   aspirin 81 MG tablet Take 81 mg by mouth daily   Yes Historical Provider, MD         Review of Systems   Constitutional: Negative. Negative for fever. HENT: Positive for congestion, ear pain (right), hearing loss and tinnitus. Respiratory: Negative. Cardiovascular: Negative. Gastrointestinal: Negative. Musculoskeletal: Negative. Neurological: Positive for dizziness and light-headedness. All other systems reviewed and are negative. Objective:   Physical Exam   Constitutional: She is oriented to person, place, and time. She appears well-developed and well-nourished. HENT:   Head: Normocephalic and atraumatic. Right Ear: A middle ear effusion is present. Left Ear: Tympanic membrane normal.   Nose: Mucosal edema, rhinorrhea and septal deviation present. Mouth/Throat: Oropharynx is clear and moist and mucous membranes are normal.   Eyes: EOM are normal. Pupils are equal, round, and reactive to light. Right eye exhibits no nystagmus. Left eye exhibits no nystagmus. Neck: Carotid bruit is not present. Cardiovascular: Normal rate, regular rhythm and normal heart sounds. No murmur heard. Pulmonary/Chest: Effort normal and breath sounds normal.   Abdominal: Soft. Bowel sounds are normal.   Musculoskeletal: She exhibits no edema. Neurological: She is alert and oriented to person, place, and time. Skin: Skin is warm and dry. Psychiatric: She has a normal mood and affect. Her behavior is normal.   Nursing note and vitals reviewed. Assessment:      1. Dizziness  Audiometry with tympanometry   2.  ETD (Eustachian tube dysfunction), right  Audiometry with tympanometry           Plan:      -  Symptoms related to ETD  -  Recommend OTC decongestant, \"ear popping\" prn  -  Schedule testing  -  Further recommendations after above

## 2018-03-29 LAB
CHOLESTEROL/HDL RATIO: 3
CHOLESTEROL: 182 MG/DL
HDLC SERPL-MCNC: 60 MG/DL
LDL CHOLESTEROL CALCULATED: 96 MG/DL
LDL/HDL RATIO: 1.6
MAGNESIUM: 2.1 MG/DL (ref 1.6–2.6)
TRIGL SERPL-MCNC: 132 MG/DL
VLDLC SERPL CALC-MCNC: 26 MG/DL

## 2018-03-31 ENCOUNTER — HOSPITAL ENCOUNTER (EMERGENCY)
Age: 63
Discharge: HOME | End: 2018-03-31
Payer: MEDICARE

## 2018-03-31 VITALS
RESPIRATION RATE: 20 BRPM | HEIGHT: 66 IN | BODY MASS INDEX: 45.16 KG/M2 | DIASTOLIC BLOOD PRESSURE: 88 MMHG | TEMPERATURE: 97.6 F | WEIGHT: 281 LBS | SYSTOLIC BLOOD PRESSURE: 189 MMHG | HEART RATE: 69 BPM | OXYGEN SATURATION: 94 %

## 2018-03-31 DIAGNOSIS — J40 BRONCHITIS: ICD-10-CM

## 2018-03-31 DIAGNOSIS — J01.10 ACUTE NON-RECURRENT FRONTAL SINUSITIS: Primary | ICD-10-CM

## 2018-03-31 LAB
FLU A ANTIGEN: NEGATIVE
INFLUENZA B AG, EIA: NEGATIVE

## 2018-03-31 PROCEDURE — 87804 INFLUENZA ASSAY W/OPTIC: CPT

## 2018-03-31 PROCEDURE — 99213 OFFICE O/P EST LOW 20 MIN: CPT | Performed by: NURSE PRACTITIONER

## 2018-03-31 PROCEDURE — 99213 OFFICE O/P EST LOW 20 MIN: CPT

## 2018-03-31 RX ORDER — BROMPHENIRAMINE MALEATE, PSEUDOEPHEDRINE HYDROCHLORIDE, AND DEXTROMETHORPHAN HYDROBROMIDE 2; 30; 10 MG/5ML; MG/5ML; MG/5ML
10 SYRUP ORAL 4 TIMES DAILY PRN
Qty: 1 BOTTLE | Refills: 0 | Status: SHIPPED | OUTPATIENT
Start: 2018-03-31 | End: 2018-04-24 | Stop reason: ALTCHOICE

## 2018-03-31 RX ORDER — AZITHROMYCIN 250 MG/1
TABLET, FILM COATED ORAL
Qty: 6 TABLET | Refills: 0 | Status: SHIPPED | OUTPATIENT
Start: 2018-03-31 | End: 2018-04-24 | Stop reason: ALTCHOICE

## 2018-03-31 ASSESSMENT — ENCOUNTER SYMPTOMS
EYE REDNESS: 0
EYE ITCHING: 0
STRIDOR: 0
CHEST TIGHTNESS: 0
ABDOMINAL PAIN: 0
VOICE CHANGE: 0
NAUSEA: 1
SINUS PRESSURE: 1
EYE PAIN: 0
SORE THROAT: 1
SINUS PAIN: 1
COUGH: 1
DIARRHEA: 0
SINUS CONGESTION: 1

## 2018-03-31 ASSESSMENT — PAIN DESCRIPTION - PAIN TYPE: TYPE: ACUTE PAIN

## 2018-03-31 ASSESSMENT — PAIN SCALES - GENERAL: PAINLEVEL_OUTOF10: 3

## 2018-03-31 ASSESSMENT — PAIN DESCRIPTION - LOCATION: LOCATION: THROAT

## 2018-03-31 ASSESSMENT — PAIN DESCRIPTION - DESCRIPTORS: DESCRIPTORS: SORE

## 2018-04-04 ENCOUNTER — HOSPITAL ENCOUNTER (OUTPATIENT)
Dept: AUDIOLOGY | Age: 63
Discharge: HOME | End: 2018-04-04
Payer: MEDICARE

## 2018-04-04 PROCEDURE — 92557 COMPREHENSIVE HEARING TEST: CPT | Performed by: AUDIOLOGIST

## 2018-04-04 PROCEDURE — 92567 TYMPANOMETRY: CPT | Performed by: AUDIOLOGIST

## 2018-04-05 DIAGNOSIS — R42 DIZZINESS: Primary | ICD-10-CM

## 2018-04-20 RX ORDER — LISINOPRIL 5 MG/1
TABLET ORAL
Qty: 30 TABLET | Refills: 0 | Status: SHIPPED | OUTPATIENT
Start: 2018-04-20 | End: 2018-08-24 | Stop reason: SDUPTHER

## 2018-04-24 ENCOUNTER — OFFICE VISIT (OUTPATIENT)
Dept: FAMILY MEDICINE CLINIC | Age: 63
End: 2018-04-24
Payer: MEDICARE

## 2018-04-24 VITALS
HEIGHT: 64 IN | SYSTOLIC BLOOD PRESSURE: 116 MMHG | RESPIRATION RATE: 16 BRPM | WEIGHT: 280 LBS | DIASTOLIC BLOOD PRESSURE: 74 MMHG | TEMPERATURE: 98 F | HEART RATE: 78 BPM | BODY MASS INDEX: 47.8 KG/M2

## 2018-04-24 DIAGNOSIS — Z91.09 ENVIRONMENTAL ALLERGIES: Primary | ICD-10-CM

## 2018-04-24 DIAGNOSIS — M15.9 PRIMARY OSTEOARTHRITIS INVOLVING MULTIPLE JOINTS: ICD-10-CM

## 2018-04-24 DIAGNOSIS — J45.20 MILD INTERMITTENT REACTIVE AIRWAY DISEASE WITHOUT COMPLICATION: ICD-10-CM

## 2018-04-24 DIAGNOSIS — Z91.09 ENVIRONMENTAL ALLERGIES: ICD-10-CM

## 2018-04-24 PROCEDURE — G8417 CALC BMI ABV UP PARAM F/U: HCPCS | Performed by: FAMILY MEDICINE

## 2018-04-24 PROCEDURE — 99213 OFFICE O/P EST LOW 20 MIN: CPT | Performed by: FAMILY MEDICINE

## 2018-04-24 PROCEDURE — G8427 DOCREV CUR MEDS BY ELIG CLIN: HCPCS | Performed by: FAMILY MEDICINE

## 2018-04-24 PROCEDURE — G8598 ASA/ANTIPLAT THER USED: HCPCS | Performed by: FAMILY MEDICINE

## 2018-04-24 PROCEDURE — 1036F TOBACCO NON-USER: CPT | Performed by: FAMILY MEDICINE

## 2018-04-24 PROCEDURE — 3017F COLORECTAL CA SCREEN DOC REV: CPT | Performed by: FAMILY MEDICINE

## 2018-04-24 RX ORDER — LEVOCETIRIZINE DIHYDROCHLORIDE 5 MG/1
TABLET, FILM COATED ORAL
Qty: 90 TABLET | Refills: 2 | Status: SHIPPED | OUTPATIENT
Start: 2018-04-24 | End: 2018-06-14

## 2018-04-24 RX ORDER — PREDNISONE 20 MG/1
20 TABLET ORAL 2 TIMES DAILY
Qty: 10 TABLET | Refills: 0 | Status: SHIPPED | OUTPATIENT
Start: 2018-04-24 | End: 2018-04-29

## 2018-04-24 RX ORDER — NAPROXEN 500 MG/1
TABLET ORAL
Qty: 180 TABLET | Refills: 2 | Status: SHIPPED | OUTPATIENT
Start: 2018-04-24 | End: 2018-08-30 | Stop reason: ALTCHOICE

## 2018-04-24 RX ORDER — ALBUTEROL SULFATE 90 UG/1
2 AEROSOL, METERED RESPIRATORY (INHALATION) EVERY 6 HOURS PRN
Qty: 1 INHALER | Refills: 2 | Status: SHIPPED | OUTPATIENT
Start: 2018-04-24 | End: 2018-07-30 | Stop reason: ALTCHOICE

## 2018-04-24 RX ORDER — LEVOCETIRIZINE DIHYDROCHLORIDE 5 MG/1
5 TABLET, FILM COATED ORAL NIGHTLY
Qty: 30 TABLET | Refills: 2 | Status: SHIPPED | OUTPATIENT
Start: 2018-04-24 | End: 2018-04-24 | Stop reason: SDUPTHER

## 2018-04-24 RX ORDER — NAPROXEN 500 MG/1
500 TABLET ORAL 2 TIMES DAILY WITH MEALS
Qty: 60 TABLET | Refills: 2 | Status: SHIPPED | OUTPATIENT
Start: 2018-04-24 | End: 2018-04-24 | Stop reason: SDUPTHER

## 2018-04-24 ASSESSMENT — ENCOUNTER SYMPTOMS
RHINORRHEA: 1
WHEEZING: 1
SHORTNESS OF BREATH: 1
GASTROINTESTINAL NEGATIVE: 1
COUGH: 1

## 2018-04-30 ENCOUNTER — HOSPITAL ENCOUNTER (OUTPATIENT)
Dept: AUDIOLOGY | Age: 63
Discharge: HOME | End: 2018-04-30
Payer: MEDICARE

## 2018-04-30 PROCEDURE — 92538 CALORIC VSTBLR TEST W/REC: CPT | Performed by: AUDIOLOGIST

## 2018-04-30 PROCEDURE — 92540 BASIC VESTIBULAR EVALUATION: CPT | Performed by: AUDIOLOGIST

## 2018-04-30 PROCEDURE — 92700 UNLISTED ORL SERVICE/PX: CPT | Performed by: AUDIOLOGIST

## 2018-06-05 ENCOUNTER — OFFICE VISIT (OUTPATIENT)
Dept: FAMILY MEDICINE CLINIC | Age: 63
End: 2018-06-05
Payer: MEDICARE

## 2018-06-05 VITALS
BODY MASS INDEX: 47.23 KG/M2 | HEART RATE: 80 BPM | HEIGHT: 65 IN | RESPIRATION RATE: 16 BRPM | WEIGHT: 283.5 LBS | DIASTOLIC BLOOD PRESSURE: 74 MMHG | SYSTOLIC BLOOD PRESSURE: 130 MMHG

## 2018-06-05 DIAGNOSIS — S16.1XXA STRAIN OF NECK MUSCLE, INITIAL ENCOUNTER: ICD-10-CM

## 2018-06-05 DIAGNOSIS — Z12.31 ENCOUNTER FOR SCREENING MAMMOGRAM FOR BREAST CANCER: ICD-10-CM

## 2018-06-05 DIAGNOSIS — M54.12 CERVICAL RADICULOPATHY: Primary | ICD-10-CM

## 2018-06-05 PROCEDURE — 1036F TOBACCO NON-USER: CPT | Performed by: NURSE PRACTITIONER

## 2018-06-05 PROCEDURE — G8417 CALC BMI ABV UP PARAM F/U: HCPCS | Performed by: NURSE PRACTITIONER

## 2018-06-05 PROCEDURE — G8427 DOCREV CUR MEDS BY ELIG CLIN: HCPCS | Performed by: NURSE PRACTITIONER

## 2018-06-05 PROCEDURE — G8598 ASA/ANTIPLAT THER USED: HCPCS | Performed by: NURSE PRACTITIONER

## 2018-06-05 PROCEDURE — 3017F COLORECTAL CA SCREEN DOC REV: CPT | Performed by: NURSE PRACTITIONER

## 2018-06-05 PROCEDURE — 99213 OFFICE O/P EST LOW 20 MIN: CPT | Performed by: NURSE PRACTITIONER

## 2018-06-05 RX ORDER — PREDNISONE 50 MG/1
50 TABLET ORAL DAILY
Qty: 5 TABLET | Refills: 0 | Status: SHIPPED | OUTPATIENT
Start: 2018-06-05 | End: 2018-06-10

## 2018-06-05 ASSESSMENT — ENCOUNTER SYMPTOMS
NAUSEA: 0
COUGH: 0
SHORTNESS OF BREATH: 0
BACK PAIN: 1
ABDOMINAL PAIN: 0

## 2018-06-13 ENCOUNTER — TELEPHONE (OUTPATIENT)
Dept: FAMILY MEDICINE CLINIC | Age: 63
End: 2018-06-13

## 2018-06-13 ENCOUNTER — OFFICE VISIT (OUTPATIENT)
Dept: UROLOGY | Age: 63
End: 2018-06-13
Payer: MEDICARE

## 2018-06-13 VITALS
SYSTOLIC BLOOD PRESSURE: 138 MMHG | BODY MASS INDEX: 47.15 KG/M2 | DIASTOLIC BLOOD PRESSURE: 88 MMHG | HEIGHT: 65 IN | WEIGHT: 283 LBS

## 2018-06-13 DIAGNOSIS — N39.3 STRESS INCONTINENCE: Primary | ICD-10-CM

## 2018-06-13 LAB
BILIRUBIN URINE: NEGATIVE
BLOOD URINE, POC: NEGATIVE
CHARACTER, URINE: CLEAR
COLOR, URINE: YELLOW
GLUCOSE URINE: NEGATIVE MG/DL
KETONES, URINE: NEGATIVE
LEUKOCYTE CLUMPS, URINE: ABNORMAL
NITRITE, URINE: NEGATIVE
PH, URINE: 6
POST VOID RESIDUAL (PVR): 6 ML
PROTEIN, URINE: NEGATIVE MG/DL
SPECIFIC GRAVITY, URINE: 1.02 (ref 1–1.03)
UROBILINOGEN, URINE: 0.2 EU/DL

## 2018-06-13 PROCEDURE — 51798 US URINE CAPACITY MEASURE: CPT | Performed by: NURSE PRACTITIONER

## 2018-06-13 PROCEDURE — G8417 CALC BMI ABV UP PARAM F/U: HCPCS | Performed by: NURSE PRACTITIONER

## 2018-06-13 PROCEDURE — 3017F COLORECTAL CA SCREEN DOC REV: CPT | Performed by: NURSE PRACTITIONER

## 2018-06-13 PROCEDURE — G8598 ASA/ANTIPLAT THER USED: HCPCS | Performed by: NURSE PRACTITIONER

## 2018-06-13 PROCEDURE — G8427 DOCREV CUR MEDS BY ELIG CLIN: HCPCS | Performed by: NURSE PRACTITIONER

## 2018-06-13 PROCEDURE — 81003 URINALYSIS AUTO W/O SCOPE: CPT | Performed by: NURSE PRACTITIONER

## 2018-06-13 PROCEDURE — 1036F TOBACCO NON-USER: CPT | Performed by: NURSE PRACTITIONER

## 2018-06-13 PROCEDURE — 99213 OFFICE O/P EST LOW 20 MIN: CPT | Performed by: NURSE PRACTITIONER

## 2018-06-13 RX ORDER — PREDNISONE 20 MG/1
20 TABLET ORAL 2 TIMES DAILY
Qty: 10 TABLET | Refills: 0 | Status: SHIPPED | OUTPATIENT
Start: 2018-06-13 | End: 2018-06-13

## 2018-06-13 RX ORDER — OXYBUTYNIN CHLORIDE 10 MG/1
10 TABLET, EXTENDED RELEASE ORAL NIGHTLY
Qty: 30 TABLET | Refills: 3 | Status: SHIPPED | OUTPATIENT
Start: 2018-06-13 | End: 2018-06-13 | Stop reason: SDUPTHER

## 2018-06-14 ENCOUNTER — OFFICE VISIT (OUTPATIENT)
Dept: FAMILY MEDICINE CLINIC | Age: 63
End: 2018-06-14
Payer: MEDICARE

## 2018-06-14 VITALS
RESPIRATION RATE: 12 BRPM | BODY MASS INDEX: 46.98 KG/M2 | DIASTOLIC BLOOD PRESSURE: 72 MMHG | HEART RATE: 72 BPM | HEIGHT: 65 IN | WEIGHT: 282 LBS | TEMPERATURE: 98.4 F | SYSTOLIC BLOOD PRESSURE: 126 MMHG

## 2018-06-14 DIAGNOSIS — J02.9 ACUTE PHARYNGITIS, UNSPECIFIED ETIOLOGY: Primary | ICD-10-CM

## 2018-06-14 PROCEDURE — G8427 DOCREV CUR MEDS BY ELIG CLIN: HCPCS | Performed by: FAMILY MEDICINE

## 2018-06-14 PROCEDURE — 99213 OFFICE O/P EST LOW 20 MIN: CPT | Performed by: FAMILY MEDICINE

## 2018-06-14 PROCEDURE — G8598 ASA/ANTIPLAT THER USED: HCPCS | Performed by: FAMILY MEDICINE

## 2018-06-14 PROCEDURE — 3017F COLORECTAL CA SCREEN DOC REV: CPT | Performed by: FAMILY MEDICINE

## 2018-06-14 PROCEDURE — G8417 CALC BMI ABV UP PARAM F/U: HCPCS | Performed by: FAMILY MEDICINE

## 2018-06-14 PROCEDURE — 1036F TOBACCO NON-USER: CPT | Performed by: FAMILY MEDICINE

## 2018-06-14 ASSESSMENT — ENCOUNTER SYMPTOMS
GASTROINTESTINAL NEGATIVE: 1
SINUS PAIN: 0
COUGH: 0
RESPIRATORY NEGATIVE: 1
RHINORRHEA: 0
SORE THROAT: 1
SINUS PRESSURE: 0

## 2018-06-15 RX ORDER — OXYBUTYNIN CHLORIDE 10 MG/1
10 TABLET, EXTENDED RELEASE ORAL NIGHTLY
Qty: 90 TABLET | Refills: 3 | Status: SHIPPED | OUTPATIENT
Start: 2018-06-15 | End: 2018-09-28

## 2018-06-20 ENCOUNTER — TELEPHONE (OUTPATIENT)
Dept: FAMILY MEDICINE CLINIC | Age: 63
End: 2018-06-20

## 2018-06-20 RX ORDER — VALACYCLOVIR HYDROCHLORIDE 1 G/1
TABLET, FILM COATED ORAL
Qty: 4 TABLET | Refills: 0 | Status: SHIPPED | OUTPATIENT
Start: 2018-06-20 | End: 2018-07-30 | Stop reason: ALTCHOICE

## 2018-07-30 ENCOUNTER — TELEPHONE (OUTPATIENT)
Dept: FAMILY MEDICINE CLINIC | Age: 63
End: 2018-07-30

## 2018-07-30 ENCOUNTER — OFFICE VISIT (OUTPATIENT)
Dept: FAMILY MEDICINE CLINIC | Age: 63
End: 2018-07-30
Payer: MEDICARE

## 2018-07-30 VITALS
SYSTOLIC BLOOD PRESSURE: 152 MMHG | TEMPERATURE: 98 F | DIASTOLIC BLOOD PRESSURE: 86 MMHG | HEIGHT: 65 IN | WEIGHT: 280.8 LBS | HEART RATE: 68 BPM | RESPIRATION RATE: 16 BRPM | BODY MASS INDEX: 46.78 KG/M2

## 2018-07-30 DIAGNOSIS — L25.5 RHUS DERMATITIS: Primary | ICD-10-CM

## 2018-07-30 DIAGNOSIS — M25.651 HIP JOINT STIFFNESS, BILATERAL: ICD-10-CM

## 2018-07-30 DIAGNOSIS — M25.652 HIP JOINT STIFFNESS, BILATERAL: ICD-10-CM

## 2018-07-30 PROCEDURE — G8427 DOCREV CUR MEDS BY ELIG CLIN: HCPCS | Performed by: NURSE PRACTITIONER

## 2018-07-30 PROCEDURE — G8417 CALC BMI ABV UP PARAM F/U: HCPCS | Performed by: NURSE PRACTITIONER

## 2018-07-30 PROCEDURE — 99213 OFFICE O/P EST LOW 20 MIN: CPT | Performed by: NURSE PRACTITIONER

## 2018-07-30 PROCEDURE — 1036F TOBACCO NON-USER: CPT | Performed by: NURSE PRACTITIONER

## 2018-07-30 PROCEDURE — 3017F COLORECTAL CA SCREEN DOC REV: CPT | Performed by: NURSE PRACTITIONER

## 2018-07-30 PROCEDURE — G8598 ASA/ANTIPLAT THER USED: HCPCS | Performed by: NURSE PRACTITIONER

## 2018-07-30 PROCEDURE — 96372 THER/PROPH/DIAG INJ SC/IM: CPT | Performed by: NURSE PRACTITIONER

## 2018-07-30 RX ORDER — METHYLPREDNISOLONE 4 MG/1
TABLET ORAL
Qty: 1 KIT | Refills: 0 | Status: SHIPPED | OUTPATIENT
Start: 2018-07-30 | End: 2018-08-05

## 2018-07-30 RX ORDER — METHYLPREDNISOLONE ACETATE 40 MG/ML
40 INJECTION, SUSPENSION INTRA-ARTICULAR; INTRALESIONAL; INTRAMUSCULAR; SOFT TISSUE ONCE
Status: COMPLETED | OUTPATIENT
Start: 2018-07-30 | End: 2018-07-30

## 2018-07-30 RX ORDER — METHYLPREDNISOLONE ACETATE 80 MG/ML
80 INJECTION, SUSPENSION INTRA-ARTICULAR; INTRALESIONAL; INTRAMUSCULAR; SOFT TISSUE ONCE
Status: COMPLETED | OUTPATIENT
Start: 2018-07-30 | End: 2018-07-30

## 2018-07-30 RX ADMIN — METHYLPREDNISOLONE ACETATE 80 MG: 80 INJECTION, SUSPENSION INTRA-ARTICULAR; INTRALESIONAL; INTRAMUSCULAR; SOFT TISSUE at 12:10

## 2018-07-30 RX ADMIN — METHYLPREDNISOLONE ACETATE 40 MG: 40 INJECTION, SUSPENSION INTRA-ARTICULAR; INTRALESIONAL; INTRAMUSCULAR; SOFT TISSUE at 12:10

## 2018-07-30 ASSESSMENT — ENCOUNTER SYMPTOMS
NAUSEA: 0
COUGH: 0
SHORTNESS OF BREATH: 0
ABDOMINAL PAIN: 0

## 2018-07-30 NOTE — PROGRESS NOTES
Subjective:      Patient ID: Skyla Escalera is a 58 y.o. female. HPI: Acute for rash    Chief Complaint   Patient presents with    Rash     bilateral arms and legs, back, abdomen, neck and Right Eye causing itching first noticed 3 days ago       Onset of 3-4 days with rash on arms, legs, abdomen and now on the face. Close to right eye. Itching. Was working in the weeds. Complains of bilateral hip stiffness. Worse after resting. Pain when stiff but this improves with resting. Denies shooting pain. Review of Systems   Constitutional: Negative for chills and fever. HENT: Negative. Respiratory: Negative for cough and shortness of breath. Cardiovascular: Negative for chest pain. Gastrointestinal: Negative for abdominal pain and nausea. Musculoskeletal: Positive for arthralgias. Skin: Positive for rash. Neurological: Negative for dizziness, light-headedness and headaches. Psychiatric/Behavioral: Negative. Objective:   Physical Exam   Constitutional: She is oriented to person, place, and time. Vital signs are normal. She appears well-developed and well-nourished. She is active. She does not have a sickly appearance. No distress. HENT:   Right Ear: Tympanic membrane normal.   Left Ear: Tympanic membrane normal.   Nose: Nose normal.   Mouth/Throat: Oropharynx is clear and moist and mucous membranes are normal.   Cardiovascular: Normal rate, regular rhythm, S1 normal, S2 normal, normal heart sounds and normal pulses. Exam reveals no S3. No murmur heard. Pulmonary/Chest: Effort normal and breath sounds normal. She has no decreased breath sounds. She has no wheezes. She has no rhonchi. Abdominal: Soft. Bowel sounds are normal. There is no tenderness. Musculoskeletal:        Right hip: She exhibits decreased range of motion and tenderness. Left hip: She exhibits decreased range of motion and tenderness. Neurological: She is alert and oriented to person, place, and time. Skin: Skin is warm and intact. Rash noted. Psychiatric: She has a normal mood and affect. Her behavior is normal.       Assessment:       Diagnosis Orders   1. Rhus dermatitis  methylPREDNISolone acetate (DEPO-MEDROL) injection 80 mg    methylPREDNISolone acetate (DEPO-MEDROL) injection 40 mg    methylPREDNISolone (MEDROL, ALEJANDRA,) 4 MG tablet    fluocinonide (LIDEX) 0.05 % cream   2.  Hip joint stiffness, bilateral  XR HIPS BILATERAL           Plan:      DEPO 120 IM  MDP  Lidex Cream for itching - avoid face  XR Hips  RTO if symptoms worsen or stay the same

## 2018-08-06 ENCOUNTER — TELEPHONE (OUTPATIENT)
Dept: FAMILY MEDICINE CLINIC | Age: 63
End: 2018-08-06

## 2018-08-06 DIAGNOSIS — L25.5 RHUS DERMATITIS: Primary | ICD-10-CM

## 2018-08-06 RX ORDER — PREDNISONE 20 MG/1
TABLET ORAL
Qty: 25 TABLET | Refills: 0 | Status: SHIPPED | OUTPATIENT
Start: 2018-08-06 | End: 2018-08-30 | Stop reason: ALTCHOICE

## 2018-08-06 NOTE — TELEPHONE ENCOUNTER
Patient stated poison ivy is not better. She still has the rash all over and she's itching like crazy. She had a shot and pills. Prescribe more pills? She does not want the cream because her insurance won't pay and it costs $140. She's been using Calamine lotion and cortisone cream. Please advise. [address] [place].

## 2018-08-08 ENCOUNTER — HOSPITAL ENCOUNTER (OUTPATIENT)
Dept: GENERAL RADIOLOGY | Age: 63
Discharge: HOME | End: 2018-08-08
Payer: MEDICARE

## 2018-08-08 ENCOUNTER — HOSPITAL ENCOUNTER (OUTPATIENT)
Age: 63
Discharge: HOME | End: 2018-08-08
Payer: MEDICARE

## 2018-08-08 DIAGNOSIS — M25.652 HIP JOINT STIFFNESS, BILATERAL: ICD-10-CM

## 2018-08-08 DIAGNOSIS — M25.651 HIP JOINT STIFFNESS, BILATERAL: ICD-10-CM

## 2018-08-08 PROCEDURE — 73521 X-RAY EXAM HIPS BI 2 VIEWS: CPT

## 2018-08-09 DIAGNOSIS — M16.0 BILATERAL HIP JOINT ARTHRITIS: Primary | ICD-10-CM

## 2018-08-09 DIAGNOSIS — M16.0 BILATERAL HIP JOINT ARTHRITIS: ICD-10-CM

## 2018-08-09 RX ORDER — MELOXICAM 15 MG/1
15 TABLET ORAL DAILY
Qty: 30 TABLET | Refills: 1 | Status: SHIPPED | OUTPATIENT
Start: 2018-08-09 | End: 2018-08-09 | Stop reason: SDUPTHER

## 2018-08-10 RX ORDER — MELOXICAM 15 MG/1
15 TABLET ORAL DAILY
Qty: 90 TABLET | Refills: 1 | Status: SHIPPED | OUTPATIENT
Start: 2018-08-10 | End: 2018-11-20 | Stop reason: ALTCHOICE

## 2018-08-22 DIAGNOSIS — I25.10 CORONARY ARTERY DISEASE INVOLVING NATIVE CORONARY ARTERY OF NATIVE HEART WITHOUT ANGINA PECTORIS: ICD-10-CM

## 2018-08-24 DIAGNOSIS — K21.9 GASTROESOPHAGEAL REFLUX DISEASE, ESOPHAGITIS PRESENCE NOT SPECIFIED: ICD-10-CM

## 2018-08-27 RX ORDER — OMEPRAZOLE 40 MG/1
CAPSULE, DELAYED RELEASE ORAL
Qty: 90 CAPSULE | Refills: 0 | Status: SHIPPED | OUTPATIENT
Start: 2018-08-27 | End: 2018-10-22 | Stop reason: SDUPTHER

## 2018-08-27 RX ORDER — LISINOPRIL 5 MG/1
TABLET ORAL
Qty: 30 TABLET | Refills: 0 | Status: SHIPPED | OUTPATIENT
Start: 2018-08-27 | End: 2018-10-22 | Stop reason: SDUPTHER

## 2018-08-30 ENCOUNTER — OFFICE VISIT (OUTPATIENT)
Dept: FAMILY MEDICINE CLINIC | Age: 63
End: 2018-08-30
Payer: MEDICARE

## 2018-08-30 VITALS
SYSTOLIC BLOOD PRESSURE: 118 MMHG | HEIGHT: 64 IN | TEMPERATURE: 98 F | DIASTOLIC BLOOD PRESSURE: 76 MMHG | WEIGHT: 276.3 LBS | HEART RATE: 76 BPM | BODY MASS INDEX: 47.17 KG/M2 | OXYGEN SATURATION: 97 % | RESPIRATION RATE: 20 BRPM

## 2018-08-30 DIAGNOSIS — J40 TRACHEOBRONCHITIS: Primary | ICD-10-CM

## 2018-08-30 PROCEDURE — 99213 OFFICE O/P EST LOW 20 MIN: CPT | Performed by: FAMILY MEDICINE

## 2018-08-30 PROCEDURE — G8417 CALC BMI ABV UP PARAM F/U: HCPCS | Performed by: FAMILY MEDICINE

## 2018-08-30 PROCEDURE — 3017F COLORECTAL CA SCREEN DOC REV: CPT | Performed by: FAMILY MEDICINE

## 2018-08-30 PROCEDURE — G8427 DOCREV CUR MEDS BY ELIG CLIN: HCPCS | Performed by: FAMILY MEDICINE

## 2018-08-30 PROCEDURE — G8598 ASA/ANTIPLAT THER USED: HCPCS | Performed by: FAMILY MEDICINE

## 2018-08-30 PROCEDURE — 1036F TOBACCO NON-USER: CPT | Performed by: FAMILY MEDICINE

## 2018-08-30 RX ORDER — PREDNISONE 20 MG/1
20 TABLET ORAL 2 TIMES DAILY
Qty: 10 TABLET | Refills: 0 | Status: SHIPPED | OUTPATIENT
Start: 2018-08-30 | End: 2018-09-04

## 2018-08-30 RX ORDER — ALBUTEROL SULFATE 90 UG/1
2 AEROSOL, METERED RESPIRATORY (INHALATION) EVERY 6 HOURS PRN
Qty: 1 INHALER | Refills: 0 | Status: SHIPPED | OUTPATIENT
Start: 2018-08-30 | End: 2018-11-20

## 2018-08-30 RX ORDER — BENZONATATE 100 MG/1
100 CAPSULE ORAL 3 TIMES DAILY PRN
Qty: 15 CAPSULE | Refills: 0 | Status: SHIPPED | OUTPATIENT
Start: 2018-08-30 | End: 2018-09-04

## 2018-08-30 ASSESSMENT — ENCOUNTER SYMPTOMS
SORE THROAT: 1
SINUS PAIN: 0
SHORTNESS OF BREATH: 1
SINUS PRESSURE: 0
COUGH: 1
GASTROINTESTINAL NEGATIVE: 1
CHEST TIGHTNESS: 1
WHEEZING: 1
RHINORRHEA: 1

## 2018-08-30 NOTE — PROGRESS NOTES
Visit Information    Have you changed or started any medications since your last visit including any over-the-counter medicines, vitamins, or herbal medicines? no   Are you having any side effects from any of your medications? -  no  Have you stopped taking any of your medications? Is so, why? -  yes - medication list updated    Have you seen any other physician or provider since your last visit? Yes - Records Requested  Have you had any other diagnostic tests since your last visit? No  Have you been seen in the emergency room and/or had an admission to a hospital since we last saw you? No  Have you had your routine dental cleaning in the past 6 months? no    Have you activated your [employer] account? If not, what are your barriers?  Yes     Patient Care Team:  Vicente Fields DO as PCP - General (Family Medicine)  Vicente Fields DO as PCP - [facility] Attributed Provider  Norman Montesinos DPM as Consulting Physician (Podiatry)  KELIN Carrasquillo CNP as Nurse Practitioner (Nurse Practitioner)    Medical History Review  Past Medical, Family, and Social History reviewed and does not contribute to the patient presenting condition    Health Maintenance   Topic Date Due    HIV screen  01/17/1969    DTaP/Tdap/Td vaccine (1 - Tdap) 01/17/1973    Pneumococcal med risk (1 of 1 - PPSV23) 01/17/1973    Shingles Vaccine (1 of 2 - 2 Dose Series) 01/17/2004    Breast cancer screen  01/13/2017    Cervical cancer screen  01/27/2018    Flu vaccine (1) 09/01/2018    TSH testing  02/22/2019    Potassium monitoring  02/22/2019    Creatinine monitoring  02/22/2019    A1C test (Diabetic or Prediabetic)  03/30/2019    Colon cancer screen colonoscopy  08/01/2020    Lipid screen  03/28/2023    Hepatitis C screen  Completed

## 2018-08-30 NOTE — PATIENT INSTRUCTIONS
You may receive a survey about your visit with us today. The feedback from our patients helps us identify what is working well and where the service to all patients can be enhanced. Thank you! Patient Education        Bronchitis: Care Instructions  Your Care Instructions    Bronchitis is inflammation of the bronchial tubes, which carry air to the lungs. The tubes swell and produce mucus, or phlegm. The mucus and inflamed bronchial tubes make you cough. You may have trouble breathing. Most cases of bronchitis are caused by viruses like those that cause colds. Antibiotics usually do not help and they may be harmful. Bronchitis usually develops rapidly and lasts about 2 to 3 weeks in otherwise healthy people. Follow-up care is a key part of your treatment and safety. Be sure to make and go to all appointments, and call your doctor if you are having problems. It's also a good idea to know your test results and keep a list of the medicines you take. How can you care for yourself at home? · Take all medicines exactly as prescribed. Call your doctor if you think you are having a problem with your medicine. · Get some extra rest.  · Take an over-the-counter pain medicine, such as acetaminophen (Tylenol), ibuprofen (Advil, Motrin), or naproxen (Aleve) to reduce fever and relieve body aches. Read and follow all instructions on the label. · Do not take two or more pain medicines at the same time unless the doctor told you to. Many pain medicines have acetaminophen, which is Tylenol. Too much acetaminophen (Tylenol) can be harmful. · Take an over-the-counter cough medicine that contains dextromethorphan to help quiet a dry, hacking cough so that you can sleep. Avoid cough medicines that have more than one active ingredient. Read and follow all instructions on the label. · Breathe moist air from a humidifier, hot shower, or sink filled with hot water.  The heat and moisture will thin mucus so you can cough it

## 2018-08-30 NOTE — PROGRESS NOTES
Subjective:      Patient ID: Duong Mendoza is a 58 y.o. female. HPI:    Chief Complaint   Patient presents with    Pharyngitis     x 2 days    Cough    Nasal Congestion    Shortness of Breath    Chest Pain    Nausea    Other     burping alot     Pt here for cough, chest congestion for the last 2 days. No fevers. Chest feels tight, some wheezing. Does also have some nausea, no vomiting. Feeling run down and tired. Patient Active Problem List   Diagnosis    CAD (coronary artery disease) 7/2014- mid lad 50 % patent LM< LCX and RCA    Hyperlipidemia LDL goal <70    Hypothyroid    History of total thyroidectomy    GERD (gastroesophageal reflux disease)    PUD (peptic ulcer disease)    IBS (irritable bowel syndrome)    IFG (impaired fasting glucose)    Vitamin D deficiency    SOB (shortness of breath) on exertion    Chest pain    Heart palpitations- intermittent    Abnormal nuclear stress test- anteroior and anteroseptal ischemia    Chest pain, atypical    Abdominal pain     Past Surgical History:   Procedure Laterality Date    CARDIAC CATHETERIZATION  2008    CARPAL TUNNEL RELEASE  2008    CHOLECYSTECTOMY      COLONOSCOPY      HEEL SPUR SURGERY      HYSTERECTOMY      KNEE SURGERY  2009    TOTAL THYROIDECTOMY      UPPER GASTROINTESTINAL ENDOSCOPY  2009     Prior to Admission medications    Medication Sig Start Date End Date Taking?  Authorizing Provider   omeprazole (PRILOSEC) 40 MG delayed release capsule TAKE 1 CAPSULE BY MOUTH DAILY WITH BREAKFAST 8/27/18  Yes Ishmael Grate, DO   lisinopril (PRINIVIL;ZESTRIL) 5 MG tablet TAKE 1 TABLET BY MOUTH EVERY DAY 8/27/18  Yes Ishmael Grate, DO   metoprolol tartrate (LOPRESSOR) 25 MG tablet TAKE ONE-HALF TABLET BY MOUTH TWICE DAILY 8/22/18  Yes Ishmael Grate, DO   meloxicam (MOBIC) 15 MG tablet TAKE 1 TABLET BY MOUTH DAILY 8/10/18  Yes Cokato Grate, DO   DiphenhydrAMINE HCl (BENADRYL ALLERGY PO) Take by mouth as needed   Yes Historical Provider, MD   oxybutynin (DITROPAN-XL) 10 MG extended release tablet TAKE 1 TABLET BY MOUTH NIGHTLY 6/15/18  Yes KELIN Morris CNP   atorvastatin (LIPITOR) 40 MG tablet TAKE 1 TABLET BY MOUTH DAILY 3/19/18  Yes KELIN Christy - CNP   ibuprofen (ADVIL;MOTRIN) 600 MG tablet TAKE 1 TABLET BY MOUTH EVERY 6 HOURS AS NEEDED FOR PAIN 2/22/18  Yes Maximino Pierre DO   Cholecalciferol (VITAMIN D PO) Take by mouth daily    Yes Historical Provider, MD   levothyroxine (SYNTHROID) 50 MCG tablet TAKE 1 TABLET EVERY MORNING WITH WATER ON AN EMPTY STOMACH- WAIT 30 MIN BEFORE EAT/TAKE OTHER MEDICATIONS 1/23/17  Yes Maximino Pierre DO   aspirin 81 MG tablet Take 81 mg by mouth daily   Yes Historical Provider, MD         Review of Systems   Constitutional: Negative. Negative for fever. HENT: Positive for congestion, rhinorrhea and sore throat. Negative for sinus pain and sinus pressure. Respiratory: Positive for cough, chest tightness, shortness of breath and wheezing. Cardiovascular: Negative. Gastrointestinal: Negative. Musculoskeletal: Negative. All other systems reviewed and are negative. Objective:   Physical Exam   Constitutional: She is oriented to person, place, and time. She appears well-developed and well-nourished. HENT:   Head: Normocephalic and atraumatic. Right Ear: A middle ear effusion is present. Left Ear: A middle ear effusion is present. Nose: Mucosal edema and rhinorrhea present. Mouth/Throat: Oropharynx is clear and moist and mucous membranes are normal.   Cardiovascular: Normal rate, regular rhythm and normal heart sounds. No murmur heard. Pulmonary/Chest: Effort normal and breath sounds normal. She has no decreased breath sounds. She has no wheezes. She has no rhonchi. She has no rales. Abdominal: Soft. Bowel sounds are normal.   Musculoskeletal: She exhibits no edema.    Neurological: She is alert and oriented to person,

## 2018-09-19 DIAGNOSIS — M25.50 POLYARTHRALGIA: ICD-10-CM

## 2018-09-19 RX ORDER — IBUPROFEN 600 MG/1
TABLET ORAL
Qty: 360 TABLET | Refills: 0 | Status: SHIPPED | OUTPATIENT
Start: 2018-09-19 | End: 2018-12-18 | Stop reason: SDUPTHER

## 2018-09-28 ENCOUNTER — OFFICE VISIT (OUTPATIENT)
Dept: UROLOGY | Age: 63
End: 2018-09-28
Payer: MEDICARE

## 2018-09-28 VITALS
SYSTOLIC BLOOD PRESSURE: 130 MMHG | WEIGHT: 273 LBS | DIASTOLIC BLOOD PRESSURE: 74 MMHG | BODY MASS INDEX: 45.48 KG/M2 | HEIGHT: 65 IN

## 2018-09-28 DIAGNOSIS — R32 URINARY INCONTINENCE, UNSPECIFIED TYPE: Primary | ICD-10-CM

## 2018-09-28 LAB
BILIRUBIN URINE: NEGATIVE
BLOOD URINE, POC: ABNORMAL
CHARACTER, URINE: CLEAR
COLOR, URINE: YELLOW
GLUCOSE URINE: NEGATIVE MG/DL
KETONES, URINE: NEGATIVE
LEUKOCYTE CLUMPS, URINE: NEGATIVE
NITRITE, URINE: NEGATIVE
PH, URINE: 6
POST VOID RESIDUAL (PVR): 19 ML
PROTEIN, URINE: ABNORMAL MG/DL
SPECIFIC GRAVITY, URINE: 1.02 (ref 1–1.03)
UROBILINOGEN, URINE: 0.2 EU/DL

## 2018-09-28 PROCEDURE — 81003 URINALYSIS AUTO W/O SCOPE: CPT | Performed by: NURSE PRACTITIONER

## 2018-09-28 PROCEDURE — 51798 US URINE CAPACITY MEASURE: CPT | Performed by: NURSE PRACTITIONER

## 2018-09-28 PROCEDURE — G8427 DOCREV CUR MEDS BY ELIG CLIN: HCPCS | Performed by: NURSE PRACTITIONER

## 2018-09-28 PROCEDURE — G8598 ASA/ANTIPLAT THER USED: HCPCS | Performed by: NURSE PRACTITIONER

## 2018-09-28 PROCEDURE — 3017F COLORECTAL CA SCREEN DOC REV: CPT | Performed by: NURSE PRACTITIONER

## 2018-09-28 PROCEDURE — G8417 CALC BMI ABV UP PARAM F/U: HCPCS | Performed by: NURSE PRACTITIONER

## 2018-09-28 PROCEDURE — 1036F TOBACCO NON-USER: CPT | Performed by: NURSE PRACTITIONER

## 2018-09-28 PROCEDURE — 99213 OFFICE O/P EST LOW 20 MIN: CPT | Performed by: NURSE PRACTITIONER

## 2018-10-05 ENCOUNTER — OFFICE VISIT (OUTPATIENT)
Dept: FAMILY MEDICINE CLINIC | Age: 63
End: 2018-10-05
Payer: MEDICARE

## 2018-10-05 VITALS
BODY MASS INDEX: 46.65 KG/M2 | RESPIRATION RATE: 16 BRPM | HEART RATE: 68 BPM | HEIGHT: 65 IN | WEIGHT: 280 LBS | TEMPERATURE: 97.7 F | SYSTOLIC BLOOD PRESSURE: 136 MMHG | DIASTOLIC BLOOD PRESSURE: 88 MMHG

## 2018-10-05 DIAGNOSIS — R11.2 NAUSEA AND VOMITING, INTRACTABILITY OF VOMITING NOT SPECIFIED, UNSPECIFIED VOMITING TYPE: ICD-10-CM

## 2018-10-05 DIAGNOSIS — R51.9 ACUTE NONINTRACTABLE HEADACHE, UNSPECIFIED HEADACHE TYPE: Primary | ICD-10-CM

## 2018-10-05 LAB
BILIRUBIN, POC: NEGATIVE
BLOOD URINE, POC: NORMAL
CLARITY, POC: NORMAL
COLOR, POC: NORMAL
GLUCOSE URINE, POC: NEGATIVE
KETONES, POC: NEGATIVE
LEUKOCYTE EST, POC: NEGATIVE
NITRITE, POC: NEGATIVE
PH, POC: 6
PROTEIN, POC: NORMAL
SPECIFIC GRAVITY, POC: 1.02
UROBILINOGEN, POC: NORMAL

## 2018-10-05 PROCEDURE — G8427 DOCREV CUR MEDS BY ELIG CLIN: HCPCS | Performed by: NURSE PRACTITIONER

## 2018-10-05 PROCEDURE — 3017F COLORECTAL CA SCREEN DOC REV: CPT | Performed by: NURSE PRACTITIONER

## 2018-10-05 PROCEDURE — 99213 OFFICE O/P EST LOW 20 MIN: CPT | Performed by: NURSE PRACTITIONER

## 2018-10-05 PROCEDURE — G8598 ASA/ANTIPLAT THER USED: HCPCS | Performed by: NURSE PRACTITIONER

## 2018-10-05 PROCEDURE — G8484 FLU IMMUNIZE NO ADMIN: HCPCS | Performed by: NURSE PRACTITIONER

## 2018-10-05 PROCEDURE — G8417 CALC BMI ABV UP PARAM F/U: HCPCS | Performed by: NURSE PRACTITIONER

## 2018-10-05 PROCEDURE — 81003 URINALYSIS AUTO W/O SCOPE: CPT | Performed by: NURSE PRACTITIONER

## 2018-10-05 PROCEDURE — 1036F TOBACCO NON-USER: CPT | Performed by: NURSE PRACTITIONER

## 2018-10-05 RX ORDER — KETOROLAC TROMETHAMINE 10 MG/1
10 TABLET, FILM COATED ORAL EVERY 6 HOURS PRN
Qty: 20 TABLET | Refills: 0 | Status: SHIPPED | OUTPATIENT
Start: 2018-10-05 | End: 2018-11-20 | Stop reason: ALTCHOICE

## 2018-10-05 RX ORDER — ONDANSETRON 4 MG/1
4 TABLET, FILM COATED ORAL DAILY PRN
Qty: 20 TABLET | Refills: 0 | Status: SHIPPED | OUTPATIENT
Start: 2018-10-05 | End: 2018-12-07 | Stop reason: SDUPTHER

## 2018-10-05 ASSESSMENT — PATIENT HEALTH QUESTIONNAIRE - PHQ9
SUM OF ALL RESPONSES TO PHQ9 QUESTIONS 1 & 2: 0
SUM OF ALL RESPONSES TO PHQ QUESTIONS 1-9: 0
1. LITTLE INTEREST OR PLEASURE IN DOING THINGS: 0
2. FEELING DOWN, DEPRESSED OR HOPELESS: 0
SUM OF ALL RESPONSES TO PHQ QUESTIONS 1-9: 0

## 2018-10-05 ASSESSMENT — ENCOUNTER SYMPTOMS
NAUSEA: 1
COUGH: 0
ABDOMINAL PAIN: 0
SHORTNESS OF BREATH: 0
VOMITING: 1

## 2018-10-05 NOTE — PROGRESS NOTES
Visit Information    Have you changed or started any medications since your last visit including any over-the-counter medicines, vitamins, or herbal medicines? no   Are you having any side effects from any of your medications? -  no  Have you stopped taking any of your medications? Is so, why? -  no    Have you seen any other physician or provider since your last visit? Yes, records obtained  Have you had any other diagnostic tests since your last visit? No  Have you been seen in the emergency room and/or had an admission to a hospital since we last saw you? No  Have you had your routine dental cleaning in the past 6 months? no    Have you activated your [employer] [employer] account? If not, what are your barriers?  Yes     Patient Care Team:  Maribel Carrera DO as PCP - General (Family Medicine)  Maribel Carrera DO as PCP - [facility] Attributed Provider  Maverick Copeland DPM as Consulting Physician (Podiatry)  KELIN Contreras CNP as Nurse Practitioner (Nurse Practitioner)    Medical History Review  Past Medical, Family, and Social History reviewed and does not contribute to the patient presenting condition    Health Maintenance   Topic Date Due    HIV screen  01/17/1969    DTaP/Tdap/Td vaccine (1 - Tdap) 01/17/1973    Pneumococcal med risk (1 of 1 - PPSV23) 01/17/1973    Shingles Vaccine (1 of 2 - 2 Dose Series) 01/17/2004    Breast cancer screen  01/13/2017    Cervical cancer screen  01/27/2018    Flu vaccine (1) 09/01/2018    TSH testing  02/22/2019    Potassium monitoring  02/22/2019    Creatinine monitoring  02/22/2019    A1C test (Diabetic or Prediabetic)  03/30/2019    Colon cancer screen colonoscopy  08/01/2020    Lipid screen  03/28/2023    Hepatitis C screen  Completed

## 2018-10-22 ENCOUNTER — OFFICE VISIT (OUTPATIENT)
Dept: FAMILY MEDICINE CLINIC | Age: 63
End: 2018-10-22
Payer: MEDICARE

## 2018-10-22 VITALS
SYSTOLIC BLOOD PRESSURE: 134 MMHG | WEIGHT: 290.3 LBS | RESPIRATION RATE: 16 BRPM | DIASTOLIC BLOOD PRESSURE: 80 MMHG | BODY MASS INDEX: 48.37 KG/M2 | HEIGHT: 65 IN | HEART RATE: 76 BPM

## 2018-10-22 DIAGNOSIS — B00.1 COLD SORE: ICD-10-CM

## 2018-10-22 DIAGNOSIS — M17.0 BILATERAL PRIMARY OSTEOARTHRITIS OF KNEE: Primary | ICD-10-CM

## 2018-10-22 PROCEDURE — G8484 FLU IMMUNIZE NO ADMIN: HCPCS | Performed by: FAMILY MEDICINE

## 2018-10-22 PROCEDURE — G8427 DOCREV CUR MEDS BY ELIG CLIN: HCPCS | Performed by: FAMILY MEDICINE

## 2018-10-22 PROCEDURE — 99213 OFFICE O/P EST LOW 20 MIN: CPT | Performed by: FAMILY MEDICINE

## 2018-10-22 PROCEDURE — G8598 ASA/ANTIPLAT THER USED: HCPCS | Performed by: FAMILY MEDICINE

## 2018-10-22 PROCEDURE — 3017F COLORECTAL CA SCREEN DOC REV: CPT | Performed by: FAMILY MEDICINE

## 2018-10-22 PROCEDURE — G8417 CALC BMI ABV UP PARAM F/U: HCPCS | Performed by: FAMILY MEDICINE

## 2018-10-22 PROCEDURE — 1036F TOBACCO NON-USER: CPT | Performed by: FAMILY MEDICINE

## 2018-10-22 RX ORDER — OMEPRAZOLE 40 MG/1
CAPSULE, DELAYED RELEASE ORAL
Qty: 90 CAPSULE | Refills: 3 | Status: SHIPPED | OUTPATIENT
Start: 2018-10-22 | End: 2019-06-05 | Stop reason: SDUPTHER

## 2018-10-22 RX ORDER — VALACYCLOVIR HYDROCHLORIDE 1 G/1
TABLET, FILM COATED ORAL
Qty: 20 TABLET | Refills: 2 | Status: SHIPPED | OUTPATIENT
Start: 2018-10-22 | End: 2019-01-22

## 2018-10-22 RX ORDER — LISINOPRIL 5 MG/1
TABLET ORAL
Qty: 90 TABLET | Refills: 3 | Status: SHIPPED | OUTPATIENT
Start: 2018-10-22 | End: 2019-07-25 | Stop reason: SDUPTHER

## 2018-10-22 ASSESSMENT — ENCOUNTER SYMPTOMS
RESPIRATORY NEGATIVE: 1
GASTROINTESTINAL NEGATIVE: 1

## 2018-10-30 ENCOUNTER — OFFICE VISIT (OUTPATIENT)
Dept: FAMILY MEDICINE CLINIC | Age: 63
End: 2018-10-30
Payer: MEDICARE

## 2018-10-30 VITALS
SYSTOLIC BLOOD PRESSURE: 124 MMHG | OXYGEN SATURATION: 98 % | BODY MASS INDEX: 47.27 KG/M2 | HEIGHT: 65 IN | HEART RATE: 74 BPM | WEIGHT: 283.7 LBS | RESPIRATION RATE: 14 BRPM | DIASTOLIC BLOOD PRESSURE: 74 MMHG

## 2018-10-30 DIAGNOSIS — M17.0 BILATERAL PRIMARY OSTEOARTHRITIS OF KNEE: Primary | ICD-10-CM

## 2018-10-30 PROCEDURE — 20610 DRAIN/INJ JOINT/BURSA W/O US: CPT | Performed by: FAMILY MEDICINE

## 2018-10-30 NOTE — PROGRESS NOTES
Alicja Junior DO    [address]:  [id number]    Lot#:  [id number]    :  Erasmo Fay    Patient Supplied?:  No    Comments:  Ex date: 3/31/21    Admin Date  10/30/2018  10:45 Action  Given by Other Dose  16 mg Route  Intra-articular Site  Knee Right Administered By  Jaci Guevara CMA ([address] [place] [address])    Ordering Provider:  Alicja Junior DO    NDC:  [address]-2    Lot#:  [id number]    :  Erasmo Blakememo    Patient Supplied?:  No    Comments:  Ex date: 3/31/21              After signing of consent and ABN per patient. Dr. Keri Cordon performed Synvisc injections to bilateral knees. Patient tolerated injections well. Patient waited 20 min in office post injection icing both knees. Patient will report and reaction to the office immediately. Icing instructions given and reviewed with patient, voiced understanding. Patient will return to the office in 1 week for Synvisc injection # 2 bilateral knees.

## 2018-11-07 ENCOUNTER — OFFICE VISIT (OUTPATIENT)
Dept: FAMILY MEDICINE CLINIC | Age: 63
End: 2018-11-07
Payer: MEDICARE

## 2018-11-07 VITALS
RESPIRATION RATE: 24 BRPM | HEART RATE: 68 BPM | HEIGHT: 65 IN | BODY MASS INDEX: 47.83 KG/M2 | WEIGHT: 287.1 LBS | DIASTOLIC BLOOD PRESSURE: 78 MMHG | SYSTOLIC BLOOD PRESSURE: 152 MMHG

## 2018-11-07 DIAGNOSIS — M17.0 BILATERAL PRIMARY OSTEOARTHRITIS OF KNEE: Primary | ICD-10-CM

## 2018-11-07 PROCEDURE — 20610 DRAIN/INJ JOINT/BURSA W/O US: CPT | Performed by: FAMILY MEDICINE

## 2018-11-07 NOTE — PROGRESS NOTES
Visit Information    Have you changed or started any medications since your last visit including any over-the-counter medicines, vitamins, or herbal medicines? no   Are you having any side effects from any of your medications? -  no  Have you stopped taking any of your medications? Is so, why? -  no    Have you seen any other physician or provider since your last visit? No  Have you had any other diagnostic tests since your last visit? No  Have you been seen in the emergency room and/or had an admission to a hospital since we last saw you? No  Have you had your routine dental cleaning in the past 6 months? no    Have you activated your [employer] account? If not, what are your barriers?  Yes     Patient Care Team:  Bonilla Rand DO as PCP - General (Family Medicine)  Bonilla Rand DO as PCP - [facility] Attributed Provider  Marjorie Patel DPM as Consulting Physician (Podiatry)  KELIN Mauro CNP as Nurse Practitioner (Nurse Practitioner)    Medical History Review  Past Medical, Family, and Social History reviewed and does contribute to the patient presenting condition    Health Maintenance   Topic Date Due    HIV screen  01/17/1969    DTaP/Tdap/Td vaccine (1 - Tdap) 01/17/1973    Pneumococcal med risk (1 of 1 - PPSV23) 01/17/1973    Shingles Vaccine (1 of 2 - 2 Dose Series) 01/17/2004    Breast cancer screen  01/13/2017    Cervical cancer screen  01/27/2018    Flu vaccine (1) 09/01/2018    TSH testing  02/22/2019    Potassium monitoring  02/22/2019    Creatinine monitoring  02/22/2019    A1C test (Diabetic or Prediabetic)  03/30/2019    Colon cancer screen colonoscopy  08/01/2020    Lipid screen  03/28/2023    Hepatitis C screen  Completed

## 2018-11-14 ENCOUNTER — OFFICE VISIT (OUTPATIENT)
Dept: FAMILY MEDICINE CLINIC | Age: 63
End: 2018-11-14
Payer: MEDICARE

## 2018-11-14 VITALS
HEART RATE: 72 BPM | RESPIRATION RATE: 20 BRPM | WEIGHT: 285 LBS | BODY MASS INDEX: 47.48 KG/M2 | SYSTOLIC BLOOD PRESSURE: 134 MMHG | DIASTOLIC BLOOD PRESSURE: 82 MMHG | HEIGHT: 65 IN

## 2018-11-14 DIAGNOSIS — Z23 NEED FOR INFLUENZA VACCINATION: ICD-10-CM

## 2018-11-14 DIAGNOSIS — M17.0 BILATERAL PRIMARY OSTEOARTHRITIS OF KNEE: Primary | ICD-10-CM

## 2018-11-14 PROCEDURE — G0008 ADMIN INFLUENZA VIRUS VAC: HCPCS | Performed by: FAMILY MEDICINE

## 2018-11-14 PROCEDURE — 90688 IIV4 VACCINE SPLT 0.5 ML IM: CPT | Performed by: FAMILY MEDICINE

## 2018-11-14 PROCEDURE — 20610 DRAIN/INJ JOINT/BURSA W/O US: CPT | Performed by: FAMILY MEDICINE

## 2018-11-14 NOTE — PROGRESS NOTES
Subjective:      Patient ID: Pool Gutierrez is a 58 y.o. female. HPI:    Chief Complaint   Patient presents with    Injections     Synvisc in bilateral knees    Flu Vaccine     Synvisc #3. Review of Systems    Objective:   Physical Exam    Assessment:       Diagnosis Orders   1. Bilateral primary osteoarthritis of knee  Hylan injection 16 mg    Hylan injection 16 mg    09472 - OR DRAIN/INJECT LARGE JOINT/BURSA   2.  Need for influenza vaccination  INFLUENZA, QUADV, 3 YRS AND OLDER, IM, MDV, 0.5ML ([address] [place] [address])           Plan:      -  Risk/benfits discussed.  After consent was obtained from patient, the Bilateral infrapatellar region of the knee was cleansed and anesthetized with ethyl chloride.  The knee then injected with 2cc of Synvisc without difficulty then ice applied for 10 min     Lot:  [id number]  Exp: 2021-03-31     -  Flu shot today  -  RTO prn        Bonilla Rand DO

## 2018-11-20 ENCOUNTER — OFFICE VISIT (OUTPATIENT)
Dept: FAMILY MEDICINE CLINIC | Age: 63
End: 2018-11-20
Payer: MEDICARE

## 2018-11-20 VITALS
OXYGEN SATURATION: 98 % | TEMPERATURE: 98.2 F | WEIGHT: 279.3 LBS | SYSTOLIC BLOOD PRESSURE: 140 MMHG | RESPIRATION RATE: 16 BRPM | HEART RATE: 68 BPM | HEIGHT: 65 IN | BODY MASS INDEX: 46.53 KG/M2 | DIASTOLIC BLOOD PRESSURE: 78 MMHG

## 2018-11-20 DIAGNOSIS — J20.8 VIRAL BRONCHITIS: Primary | ICD-10-CM

## 2018-11-20 PROCEDURE — G8598 ASA/ANTIPLAT THER USED: HCPCS | Performed by: FAMILY MEDICINE

## 2018-11-20 PROCEDURE — G8427 DOCREV CUR MEDS BY ELIG CLIN: HCPCS | Performed by: FAMILY MEDICINE

## 2018-11-20 PROCEDURE — 99213 OFFICE O/P EST LOW 20 MIN: CPT | Performed by: FAMILY MEDICINE

## 2018-11-20 PROCEDURE — 1036F TOBACCO NON-USER: CPT | Performed by: FAMILY MEDICINE

## 2018-11-20 PROCEDURE — G8482 FLU IMMUNIZE ORDER/ADMIN: HCPCS | Performed by: FAMILY MEDICINE

## 2018-11-20 PROCEDURE — G8417 CALC BMI ABV UP PARAM F/U: HCPCS | Performed by: FAMILY MEDICINE

## 2018-11-20 PROCEDURE — 3017F COLORECTAL CA SCREEN DOC REV: CPT | Performed by: FAMILY MEDICINE

## 2018-11-20 RX ORDER — PREDNISONE 20 MG/1
20 TABLET ORAL 2 TIMES DAILY
Qty: 10 TABLET | Refills: 0 | Status: SHIPPED | OUTPATIENT
Start: 2018-11-20 | End: 2018-11-25

## 2018-11-20 RX ORDER — BENZONATATE 100 MG/1
100 CAPSULE ORAL 3 TIMES DAILY PRN
Qty: 15 CAPSULE | Refills: 0 | Status: SHIPPED | OUTPATIENT
Start: 2018-11-20 | End: 2018-11-25

## 2018-11-20 ASSESSMENT — ENCOUNTER SYMPTOMS
GASTROINTESTINAL NEGATIVE: 1
COUGH: 1
CHEST TIGHTNESS: 1

## 2018-11-20 NOTE — PROGRESS NOTES
Visit Information    Have you changed or started any medications since your last visit including any over-the-counter medicines, vitamins, or herbal medicines? no   Are you having any side effects from any of your medications? -  no  Have you stopped taking any of your medications? Is so, why? -  yes - medication list updated    Have you seen any other physician or provider since your last visit? No  Have you had any other diagnostic tests since your last visit? No  Have you been seen in the emergency room and/or had an admission to a hospital since we last saw you? No  Have you had your routine dental cleaning in the past 6 months? no    Have you activated your [employer] account? If not, what are your barriers?  Yes     Patient Care Team:  Phyllis Serrano DO as PCP - General (Family Medicine)  Phyllis Serrano DO as PCP - S Attributed Provider  Khoa Elias DPM as Consulting Physician (Podiatry)  KELIN Matthew CNP as Nurse Practitioner (Nurse Practitioner)    Medical History Review  Past Medical, Family, and Social History reviewed and does not contribute to the patient presenting condition    Health Maintenance   Topic Date Due    HIV screen  01/17/1969    DTaP/Tdap/Td vaccine (1 - Tdap) 01/17/1973    Pneumococcal med risk (1 of 1 - PPSV23) 01/17/1973    Shingles Vaccine (1 of 2 - 2 Dose Series) 01/17/2004    Breast cancer screen  01/13/2017    Cervical cancer screen  01/27/2018    TSH testing  02/22/2019    Potassium monitoring  02/22/2019    Creatinine monitoring  02/22/2019    A1C test (Diabetic or Prediabetic)  03/30/2019    Colon cancer screen colonoscopy  08/01/2020    Lipid screen  03/28/2023    Flu vaccine  Completed    Hepatitis C screen  Completed

## 2018-12-07 ENCOUNTER — APPOINTMENT (OUTPATIENT)
Dept: CT IMAGING | Age: 63
End: 2018-12-07
Payer: MEDICARE

## 2018-12-07 ENCOUNTER — HOSPITAL ENCOUNTER (EMERGENCY)
Age: 63
Discharge: HOME | End: 2018-12-07
Attending: INTERNAL MEDICINE
Payer: MEDICARE

## 2018-12-07 VITALS
SYSTOLIC BLOOD PRESSURE: 163 MMHG | TEMPERATURE: 97.9 F | RESPIRATION RATE: 20 BRPM | DIASTOLIC BLOOD PRESSURE: 72 MMHG | OXYGEN SATURATION: 96 % | HEART RATE: 57 BPM

## 2018-12-07 DIAGNOSIS — R42 VERTIGO: Primary | ICD-10-CM

## 2018-12-07 DIAGNOSIS — E87.6 HYPOKALEMIA: ICD-10-CM

## 2018-12-07 DIAGNOSIS — J30.9 ALLERGIC RHINITIS, UNSPECIFIED SEASONALITY, UNSPECIFIED TRIGGER: ICD-10-CM

## 2018-12-07 LAB
ALBUMIN SERPL-MCNC: 3.5 G/DL (ref 3.5–5.1)
ALP BLD-CCNC: 69 U/L (ref 38–126)
ALT SERPL-CCNC: 14 U/L (ref 11–66)
ANION GAP SERPL CALCULATED.3IONS-SCNC: 11 MEQ/L (ref 8–16)
AST SERPL-CCNC: 16 U/L (ref 5–40)
BASOPHILS # BLD: 0.5 %
BASOPHILS ABSOLUTE: 0 THOU/MM3 (ref 0–0.1)
BILIRUB SERPL-MCNC: 0.3 MG/DL (ref 0.3–1.2)
BILIRUBIN DIRECT: < 0.2 MG/DL (ref 0–0.3)
BILIRUBIN URINE: NEGATIVE
BLOOD, URINE: NEGATIVE
BUN BLDV-MCNC: 11 MG/DL (ref 7–22)
CALCIUM SERPL-MCNC: 8.7 MG/DL (ref 8.5–10.5)
CHARACTER, URINE: CLEAR
CHLORIDE BLD-SCNC: 104 MEQ/L (ref 98–111)
CO2: 27 MEQ/L (ref 23–33)
COLOR: YELLOW
CREAT SERPL-MCNC: 0.5 MG/DL (ref 0.4–1.2)
EKG ATRIAL RATE: 58 BPM
EKG P AXIS: 49 DEGREES
EKG P-R INTERVAL: 198 MS
EKG Q-T INTERVAL: 428 MS
EKG QRS DURATION: 98 MS
EKG QTC CALCULATION (BAZETT): 420 MS
EKG R AXIS: 5 DEGREES
EKG T AXIS: 53 DEGREES
EKG VENTRICULAR RATE: 58 BPM
EOSINOPHIL # BLD: 6.1 %
EOSINOPHILS ABSOLUTE: 0.3 THOU/MM3 (ref 0–0.4)
ERYTHROCYTE [DISTWIDTH] IN BLOOD BY AUTOMATED COUNT: 13.3 % (ref 11.5–14.5)
ERYTHROCYTE [DISTWIDTH] IN BLOOD BY AUTOMATED COUNT: 46.2 FL (ref 35–45)
GFR SERPL CREATININE-BSD FRML MDRD: > 90 ML/MIN/1.73M2
GLUCOSE BLD-MCNC: 95 MG/DL (ref 70–108)
GLUCOSE URINE: NEGATIVE MG/DL
HCT VFR BLD CALC: 39 % (ref 37–47)
HEMOGLOBIN: 12.5 GM/DL (ref 12–16)
IMMATURE GRANS (ABS): 0.01 THOU/MM3 (ref 0–0.07)
IMMATURE GRANULOCYTES: 0.2 %
KETONES, URINE: NEGATIVE
LEUKOCYTE ESTERASE, URINE: NEGATIVE
LIPASE: 20.1 U/L (ref 5.6–51.3)
LYMPHOCYTES # BLD: 25 %
LYMPHOCYTES ABSOLUTE: 1.4 THOU/MM3 (ref 1–4.8)
MCH RBC QN AUTO: 30.4 PG (ref 26–33)
MCHC RBC AUTO-ENTMCNC: 32.1 GM/DL (ref 32.2–35.5)
MCV RBC AUTO: 94.9 FL (ref 81–99)
MONOCYTES # BLD: 7.1 %
MONOCYTES ABSOLUTE: 0.4 THOU/MM3 (ref 0.4–1.3)
NITRITE, URINE: NEGATIVE
NUCLEATED RED BLOOD CELLS: 0 /100 WBC
OSMOLALITY CALCULATION: 282.3 MOSMOL/KG (ref 275–300)
PH UA: 6
PLATELET # BLD: 307 THOU/MM3 (ref 130–400)
PMV BLD AUTO: 8.8 FL (ref 9.4–12.4)
POTASSIUM SERPL-SCNC: 3.2 MEQ/L (ref 3.5–5.2)
PROTEIN UA: NEGATIVE
RBC # BLD: 4.11 MILL/MM3 (ref 4.2–5.4)
SEG NEUTROPHILS: 61.1 %
SEGMENTED NEUTROPHILS ABSOLUTE COUNT: 3.4 THOU/MM3 (ref 1.8–7.7)
SODIUM BLD-SCNC: 142 MEQ/L (ref 135–145)
SPECIFIC GRAVITY, URINE: 1.02 (ref 1–1.03)
TOTAL PROTEIN: 6.3 G/DL (ref 6.1–8)
TROPONIN T: < 0.01 NG/ML
UROBILINOGEN, URINE: 0.2 EU/DL
WBC # BLD: 5.6 THOU/MM3 (ref 4.8–10.8)

## 2018-12-07 PROCEDURE — 93010 ELECTROCARDIOGRAM REPORT: CPT | Performed by: NUCLEAR MEDICINE

## 2018-12-07 PROCEDURE — 82248 BILIRUBIN DIRECT: CPT

## 2018-12-07 PROCEDURE — 83690 ASSAY OF LIPASE: CPT

## 2018-12-07 PROCEDURE — 96375 TX/PRO/DX INJ NEW DRUG ADDON: CPT

## 2018-12-07 PROCEDURE — 6370000000 HC RX 637 (ALT 250 FOR IP): Performed by: INTERNAL MEDICINE

## 2018-12-07 PROCEDURE — 6360000002 HC RX W HCPCS: Performed by: INTERNAL MEDICINE

## 2018-12-07 PROCEDURE — 84484 ASSAY OF TROPONIN QUANT: CPT

## 2018-12-07 PROCEDURE — 96374 THER/PROPH/DIAG INJ IV PUSH: CPT

## 2018-12-07 PROCEDURE — 85025 COMPLETE CBC W/AUTO DIFF WBC: CPT

## 2018-12-07 PROCEDURE — 93005 ELECTROCARDIOGRAM TRACING: CPT | Performed by: INTERNAL MEDICINE

## 2018-12-07 PROCEDURE — 80053 COMPREHEN METABOLIC PANEL: CPT

## 2018-12-07 PROCEDURE — 36415 COLL VENOUS BLD VENIPUNCTURE: CPT

## 2018-12-07 PROCEDURE — 99284 EMERGENCY DEPT VISIT MOD MDM: CPT

## 2018-12-07 PROCEDURE — 81003 URINALYSIS AUTO W/O SCOPE: CPT

## 2018-12-07 RX ORDER — MECLIZINE HYDROCHLORIDE CHEWABLE TABLETS 25 MG/1
25 TABLET, CHEWABLE ORAL ONCE
Status: COMPLETED | OUTPATIENT
Start: 2018-12-07 | End: 2018-12-07

## 2018-12-07 RX ORDER — CETIRIZINE HYDROCHLORIDE 10 MG/1
10 TABLET ORAL DAILY
Qty: 30 TABLET | Refills: 0 | Status: SHIPPED | OUTPATIENT
Start: 2018-12-07 | End: 2019-01-06

## 2018-12-07 RX ORDER — POTASSIUM CHLORIDE 750 MG/1
20 TABLET, FILM COATED, EXTENDED RELEASE ORAL ONCE
Status: COMPLETED | OUTPATIENT
Start: 2018-12-07 | End: 2018-12-07

## 2018-12-07 RX ORDER — ONDANSETRON 4 MG/1
4 TABLET, ORALLY DISINTEGRATING ORAL EVERY 8 HOURS PRN
Qty: 20 TABLET | Refills: 0 | Status: SHIPPED | OUTPATIENT
Start: 2018-12-07 | End: 2019-01-07 | Stop reason: SDUPTHER

## 2018-12-07 RX ORDER — POTASSIUM CHLORIDE 750 MG/1
20 TABLET, FILM COATED, EXTENDED RELEASE ORAL
Status: DISCONTINUED | OUTPATIENT
Start: 2018-12-08 | End: 2018-12-07

## 2018-12-07 RX ORDER — DIPHENHYDRAMINE HYDROCHLORIDE 50 MG/ML
25 INJECTION INTRAMUSCULAR; INTRAVENOUS ONCE
Status: COMPLETED | OUTPATIENT
Start: 2018-12-07 | End: 2018-12-07

## 2018-12-07 RX ORDER — MECLIZINE HYDROCHLORIDE 25 MG/1
12.5 TABLET ORAL 3 TIMES DAILY PRN
Qty: 30 TABLET | Refills: 0 | Status: SHIPPED | OUTPATIENT
Start: 2018-12-07 | End: 2018-12-17

## 2018-12-07 RX ADMIN — PROCHLORPERAZINE EDISYLATE 10 MG: 5 INJECTION INTRAMUSCULAR; INTRAVENOUS at 10:35

## 2018-12-07 RX ADMIN — POTASSIUM CHLORIDE 20 MEQ: 750 TABLET, EXTENDED RELEASE ORAL at 11:56

## 2018-12-07 RX ADMIN — MECLIZINE HCL 25 MG: 25 TABLET, CHEWABLE ORAL at 10:35

## 2018-12-07 RX ADMIN — DIPHENHYDRAMINE HYDROCHLORIDE 25 MG: 50 INJECTION INTRAMUSCULAR; INTRAVENOUS at 10:35

## 2018-12-07 ASSESSMENT — ENCOUNTER SYMPTOMS
RHINORRHEA: 0
WHEEZING: 0
COUGH: 0
BACK PAIN: 0
CHEST TIGHTNESS: 0
PHOTOPHOBIA: 0
DIARRHEA: 0
SORE THROAT: 0
BLOOD IN STOOL: 0
CONSTIPATION: 0
SHORTNESS OF BREATH: 0
ABDOMINAL PAIN: 0
VOMITING: 0
NAUSEA: 1

## 2018-12-07 ASSESSMENT — PAIN DESCRIPTION - PAIN TYPE: TYPE: ACUTE PAIN

## 2018-12-07 ASSESSMENT — PAIN DESCRIPTION - FREQUENCY: FREQUENCY: CONTINUOUS

## 2018-12-07 ASSESSMENT — PAIN DESCRIPTION - ORIENTATION: ORIENTATION: LOWER

## 2018-12-07 ASSESSMENT — PAIN DESCRIPTION - DESCRIPTORS: DESCRIPTORS: CRAMPING

## 2018-12-07 ASSESSMENT — PAIN DESCRIPTION - LOCATION: LOCATION: BACK

## 2018-12-07 ASSESSMENT — PAIN SCALES - GENERAL: PAINLEVEL_OUTOF10: 3

## 2018-12-07 NOTE — ED PROVIDER NOTES
has No Known Allergies. FAMILY HISTORY     indicated that her mother is . She indicated that her father is . She indicated that her brother is alive. She indicated that the status of her maternal grandmother is unknown. She indicated that the status of her maternal grandfather is unknown. She indicated that the status of her paternal grandmother is unknown. She indicated that the status of her paternal grandfather is unknown.    family history includes Arthritis in her mother; Diabetes in her brother; Other in her brother, father, maternal grandfather, maternal grandmother, mother, paternal grandfather, and paternal grandmother. SOCIAL HISTORY      reports that she has never smoked. She has never used smokeless tobacco. She reports that she does not drink alcohol or use drugs. PHYSICAL EXAM     INITIAL VITALS:  oral temperature is 97.9 °F (36.6 °C). Her blood pressure is 163/72 (abnormal) and her pulse is 57. Her respiration is 20 and oxygen saturation is 96%. Physical Exam   Constitutional: She is oriented to person, place, and time. She appears well-developed and well-nourished. She is active and cooperative. Non-toxic appearance. HENT:   Head: Normocephalic and atraumatic. Right Ear: Tympanic membrane and external ear normal.   Left Ear: Tympanic membrane and external ear normal.   Nose: Nose normal.   Mouth/Throat: Uvula is midline and mucous membranes are normal. Mucous membranes are not dry. Posterior oropharyngeal erythema present. Eyes: Conjunctivae, EOM and lids are normal. Right eye exhibits no exudate. Left eye exhibits no exudate. No scleral icterus. Neck: Normal range of motion. Neck supple. No JVD present. No tracheal deviation present. Cardiovascular: Regular rhythm, S1 normal, S2 normal, normal heart sounds and intact distal pulses. Bradycardia present. Exam reveals no gallop. No murmur heard.   Pulmonary/Chest: Effort normal and breath sounds normal. No

## 2018-12-18 DIAGNOSIS — M25.50 POLYARTHRALGIA: ICD-10-CM

## 2018-12-18 RX ORDER — IBUPROFEN 600 MG/1
TABLET ORAL
Qty: 360 TABLET | Refills: 0 | Status: SHIPPED | OUTPATIENT
Start: 2018-12-18 | End: 2019-04-22 | Stop reason: SDUPTHER

## 2018-12-31 ENCOUNTER — TELEPHONE (OUTPATIENT)
Dept: FAMILY MEDICINE CLINIC | Age: 63
End: 2018-12-31

## 2018-12-31 DIAGNOSIS — E87.6 HYPOKALEMIA: Primary | ICD-10-CM

## 2019-01-07 RX ORDER — ONDANSETRON 4 MG/1
4 TABLET, ORALLY DISINTEGRATING ORAL EVERY 8 HOURS PRN
Qty: 20 TABLET | Refills: 0 | Status: SHIPPED | OUTPATIENT
Start: 2019-01-07 | End: 2019-03-07 | Stop reason: SDUPTHER

## 2019-01-18 LAB
ANION GAP SERPL CALCULATED.3IONS-SCNC: 13 MEQ/L (ref 10–19)
BUN BLDV-MCNC: 13 MG/DL (ref 8–23)
CALCIUM SERPL-MCNC: 8.9 MG/DL (ref 8.5–10.5)
CHLORIDE BLD-SCNC: 105 MEQ/L (ref 95–107)
CO2: 26 MEQ/L (ref 19–31)
CREAT SERPL-MCNC: 0.6 MG/DL (ref 0.6–1.3)
EGFR AFRICAN AMERICAN: 110.9 ML/MIN/1.73 M2
EGFR IF NONAFRICAN AMERICAN: 95.7 ML/MIN/1.73 M2
GLUCOSE: 92 MG/DL (ref 70–99)
POTASSIUM SERPL-SCNC: 3.9 MEQ/L (ref 3.5–5.4)
SODIUM BLD-SCNC: 144 MEQ/L (ref 135–146)

## 2019-01-21 ENCOUNTER — TELEPHONE (OUTPATIENT)
Dept: FAMILY MEDICINE CLINIC | Age: 64
End: 2019-01-21

## 2019-01-22 ENCOUNTER — HOSPITAL ENCOUNTER (EMERGENCY)
Dept: GENERAL RADIOLOGY | Age: 64
Discharge: HOME | End: 2019-01-22
Payer: MEDICARE

## 2019-01-22 ENCOUNTER — HOSPITAL ENCOUNTER (EMERGENCY)
Age: 64
Discharge: HOME | End: 2019-01-22
Payer: MEDICARE

## 2019-01-22 VITALS
BODY MASS INDEX: 42.99 KG/M2 | OXYGEN SATURATION: 95 % | TEMPERATURE: 97.7 F | RESPIRATION RATE: 16 BRPM | HEART RATE: 78 BPM | HEIGHT: 65 IN | SYSTOLIC BLOOD PRESSURE: 136 MMHG | WEIGHT: 258 LBS | DIASTOLIC BLOOD PRESSURE: 80 MMHG

## 2019-01-22 DIAGNOSIS — S93.402A SPRAIN OF LEFT ANKLE, UNSPECIFIED LIGAMENT, INITIAL ENCOUNTER: Primary | ICD-10-CM

## 2019-01-22 PROCEDURE — 99214 OFFICE O/P EST MOD 30 MIN: CPT | Performed by: NURSE PRACTITIONER

## 2019-01-22 PROCEDURE — 99213 OFFICE O/P EST LOW 20 MIN: CPT

## 2019-01-22 PROCEDURE — 2709999900 HC NON-CHARGEABLE SUPPLY

## 2019-01-22 PROCEDURE — 73610 X-RAY EXAM OF ANKLE: CPT

## 2019-01-22 ASSESSMENT — ENCOUNTER SYMPTOMS
ABDOMINAL PAIN: 0
VOMITING: 0
DIARRHEA: 0
NAUSEA: 0
CHEST TIGHTNESS: 0
SHORTNESS OF BREATH: 0

## 2019-01-22 ASSESSMENT — PAIN - FUNCTIONAL ASSESSMENT: PAIN_FUNCTIONAL_ASSESSMENT: PREVENTS OR INTERFERES SOME ACTIVE ACTIVITIES AND ADLS

## 2019-01-22 ASSESSMENT — PAIN DESCRIPTION - ORIENTATION: ORIENTATION: LEFT

## 2019-01-22 ASSESSMENT — PAIN DESCRIPTION - FREQUENCY: FREQUENCY: CONTINUOUS

## 2019-01-22 ASSESSMENT — PAIN DESCRIPTION - DESCRIPTORS: DESCRIPTORS: ACHING;THROBBING;SORE

## 2019-01-22 ASSESSMENT — PAIN DESCRIPTION - LOCATION: LOCATION: ANKLE

## 2019-01-22 ASSESSMENT — PAIN SCALES - GENERAL: PAINLEVEL_OUTOF10: 6

## 2019-01-22 ASSESSMENT — PAIN DESCRIPTION - ONSET: ONSET: SUDDEN

## 2019-01-22 ASSESSMENT — PAIN DESCRIPTION - PROGRESSION: CLINICAL_PROGRESSION: GRADUALLY WORSENING

## 2019-01-23 DIAGNOSIS — M25.50 POLYARTHRALGIA: ICD-10-CM

## 2019-01-23 RX ORDER — IBUPROFEN 600 MG/1
TABLET ORAL
Qty: 360 TABLET | Refills: 0 | OUTPATIENT
Start: 2019-01-23

## 2019-03-07 ENCOUNTER — OFFICE VISIT (OUTPATIENT)
Dept: FAMILY MEDICINE CLINIC | Age: 64
End: 2019-03-07
Payer: MEDICARE

## 2019-03-07 VITALS
HEART RATE: 84 BPM | SYSTOLIC BLOOD PRESSURE: 130 MMHG | WEIGHT: 283.2 LBS | RESPIRATION RATE: 16 BRPM | HEIGHT: 65 IN | BODY MASS INDEX: 47.18 KG/M2 | DIASTOLIC BLOOD PRESSURE: 72 MMHG

## 2019-03-07 DIAGNOSIS — E66.01 MORBID OBESITY WITH BMI OF 45.0-49.9, ADULT (HCC): ICD-10-CM

## 2019-03-07 DIAGNOSIS — Z78.0 POSTMENOPAUSAL: ICD-10-CM

## 2019-03-07 DIAGNOSIS — R73.01 IFG (IMPAIRED FASTING GLUCOSE): ICD-10-CM

## 2019-03-07 DIAGNOSIS — H65.91 MEE (MIDDLE EAR EFFUSION), RIGHT: Primary | ICD-10-CM

## 2019-03-07 DIAGNOSIS — H69.81 ETD (EUSTACHIAN TUBE DYSFUNCTION), RIGHT: ICD-10-CM

## 2019-03-07 DIAGNOSIS — M25.50 ARTHRALGIA OF MULTIPLE SITES, BILATERAL: ICD-10-CM

## 2019-03-07 DIAGNOSIS — Z12.31 ENCOUNTER FOR SCREENING MAMMOGRAM FOR BREAST CANCER: ICD-10-CM

## 2019-03-07 DIAGNOSIS — M25.50 ARTHRALGIA OF MULTIPLE JOINTS: ICD-10-CM

## 2019-03-07 DIAGNOSIS — E03.9 ACQUIRED HYPOTHYROIDISM: ICD-10-CM

## 2019-03-07 PROCEDURE — 1036F TOBACCO NON-USER: CPT | Performed by: NURSE PRACTITIONER

## 2019-03-07 PROCEDURE — 1123F ACP DISCUSS/DSCN MKR DOCD: CPT | Performed by: NURSE PRACTITIONER

## 2019-03-07 PROCEDURE — 4040F PNEUMOC VAC/ADMIN/RCVD: CPT | Performed by: NURSE PRACTITIONER

## 2019-03-07 PROCEDURE — 1101F PT FALLS ASSESS-DOCD LE1/YR: CPT | Performed by: NURSE PRACTITIONER

## 2019-03-07 PROCEDURE — G8400 PT W/DXA NO RESULTS DOC: HCPCS | Performed by: NURSE PRACTITIONER

## 2019-03-07 PROCEDURE — G8427 DOCREV CUR MEDS BY ELIG CLIN: HCPCS | Performed by: NURSE PRACTITIONER

## 2019-03-07 PROCEDURE — G8598 ASA/ANTIPLAT THER USED: HCPCS | Performed by: NURSE PRACTITIONER

## 2019-03-07 PROCEDURE — 1090F PRES/ABSN URINE INCON ASSESS: CPT | Performed by: NURSE PRACTITIONER

## 2019-03-07 PROCEDURE — G8417 CALC BMI ABV UP PARAM F/U: HCPCS | Performed by: NURSE PRACTITIONER

## 2019-03-07 PROCEDURE — 99214 OFFICE O/P EST MOD 30 MIN: CPT | Performed by: NURSE PRACTITIONER

## 2019-03-07 PROCEDURE — G8482 FLU IMMUNIZE ORDER/ADMIN: HCPCS | Performed by: NURSE PRACTITIONER

## 2019-03-07 PROCEDURE — 3017F COLORECTAL CA SCREEN DOC REV: CPT | Performed by: NURSE PRACTITIONER

## 2019-03-07 PROCEDURE — 96372 THER/PROPH/DIAG INJ SC/IM: CPT | Performed by: NURSE PRACTITIONER

## 2019-03-07 RX ORDER — ONDANSETRON 4 MG/1
4 TABLET, ORALLY DISINTEGRATING ORAL EVERY 8 HOURS PRN
Qty: 20 TABLET | Refills: 0 | Status: SHIPPED | OUTPATIENT
Start: 2019-03-07 | End: 2019-04-22 | Stop reason: SDUPTHER

## 2019-03-07 RX ORDER — METHYLPREDNISOLONE ACETATE 40 MG/ML
40 INJECTION, SUSPENSION INTRA-ARTICULAR; INTRALESIONAL; INTRAMUSCULAR; SOFT TISSUE ONCE
Status: COMPLETED | OUTPATIENT
Start: 2019-03-07 | End: 2019-03-07

## 2019-03-07 RX ORDER — METHYLPREDNISOLONE ACETATE 80 MG/ML
80 INJECTION, SUSPENSION INTRA-ARTICULAR; INTRALESIONAL; INTRAMUSCULAR; SOFT TISSUE ONCE
Status: COMPLETED | OUTPATIENT
Start: 2019-03-07 | End: 2019-03-07

## 2019-03-07 RX ADMIN — METHYLPREDNISOLONE ACETATE 40 MG: 40 INJECTION, SUSPENSION INTRA-ARTICULAR; INTRALESIONAL; INTRAMUSCULAR; SOFT TISSUE at 15:56

## 2019-03-07 RX ADMIN — METHYLPREDNISOLONE ACETATE 80 MG: 80 INJECTION, SUSPENSION INTRA-ARTICULAR; INTRALESIONAL; INTRAMUSCULAR; SOFT TISSUE at 15:56

## 2019-03-07 ASSESSMENT — PATIENT HEALTH QUESTIONNAIRE - PHQ9
SUM OF ALL RESPONSES TO PHQ QUESTIONS 1-9: 0
1. LITTLE INTEREST OR PLEASURE IN DOING THINGS: 0
2. FEELING DOWN, DEPRESSED OR HOPELESS: 0
SUM OF ALL RESPONSES TO PHQ9 QUESTIONS 1 & 2: 0
SUM OF ALL RESPONSES TO PHQ QUESTIONS 1-9: 0

## 2019-03-10 ASSESSMENT — ENCOUNTER SYMPTOMS
ABDOMINAL PAIN: 0
RHINORRHEA: 1
COUGH: 0
SHORTNESS OF BREATH: 0
NAUSEA: 0

## 2019-04-22 DIAGNOSIS — E78.5 HYPERLIPIDEMIA LDL GOAL <70: ICD-10-CM

## 2019-04-22 DIAGNOSIS — M25.50 POLYARTHRALGIA: ICD-10-CM

## 2019-04-22 RX ORDER — IBUPROFEN 600 MG/1
TABLET ORAL
Qty: 360 TABLET | Refills: 0 | Status: SHIPPED | OUTPATIENT
Start: 2019-04-22 | End: 2019-04-29 | Stop reason: SDUPTHER

## 2019-04-22 RX ORDER — ONDANSETRON 4 MG/1
4 TABLET, ORALLY DISINTEGRATING ORAL EVERY 8 HOURS PRN
Qty: 20 TABLET | Refills: 0 | Status: SHIPPED | OUTPATIENT
Start: 2019-04-22 | End: 2019-04-29 | Stop reason: SDUPTHER

## 2019-04-22 RX ORDER — IBUPROFEN 600 MG/1
TABLET ORAL
Qty: 360 TABLET | Refills: 0 | Status: SHIPPED | OUTPATIENT
Start: 2019-04-22 | End: 2019-08-06 | Stop reason: SDUPTHER

## 2019-04-22 RX ORDER — ATORVASTATIN CALCIUM 40 MG/1
40 TABLET, FILM COATED ORAL DAILY
Qty: 90 TABLET | Refills: 3 | Status: SHIPPED | OUTPATIENT
Start: 2019-04-22 | End: 2019-04-29 | Stop reason: SDUPTHER

## 2019-04-29 DIAGNOSIS — M25.50 POLYARTHRALGIA: ICD-10-CM

## 2019-04-29 DIAGNOSIS — E78.5 HYPERLIPIDEMIA LDL GOAL <70: ICD-10-CM

## 2019-04-29 RX ORDER — IBUPROFEN 600 MG/1
TABLET ORAL
Qty: 360 TABLET | Refills: 0 | Status: SHIPPED | OUTPATIENT
Start: 2019-04-29 | End: 2019-08-05 | Stop reason: SDUPTHER

## 2019-04-29 RX ORDER — ONDANSETRON 4 MG/1
4 TABLET, ORALLY DISINTEGRATING ORAL EVERY 8 HOURS PRN
Qty: 20 TABLET | Refills: 0 | Status: SHIPPED | OUTPATIENT
Start: 2019-04-29 | End: 2019-08-05 | Stop reason: ALTCHOICE

## 2019-04-29 RX ORDER — ATORVASTATIN CALCIUM 40 MG/1
40 TABLET, FILM COATED ORAL DAILY
Qty: 90 TABLET | Refills: 3 | Status: SHIPPED | OUTPATIENT
Start: 2019-04-29 | End: 2019-08-15

## 2019-04-29 NOTE — TELEPHONE ENCOUNTER
Fax received from pharmacy for refill of ibuprofen, zofran and atorvastatin  Please refill if appropriate

## 2019-06-03 LAB
ABSOLUTE BASO #: 0.1 K/UL (ref 0–0.1)
ABSOLUTE EOS #: 0.2 K/UL (ref 0.1–0.4)
ABSOLUTE LYMPH #: 2 K/UL (ref 0.8–5.2)
ABSOLUTE MONO #: 0.4 K/UL (ref 0.1–0.9)
ABSOLUTE NEUT #: 2.6 K/UL (ref 1.3–9.1)
BASOPHILS RELATIVE PERCENT: 1.1 %
EOSINOPHILS RELATIVE PERCENT: 4.1 %
HCT VFR BLD CALC: 39.8 % (ref 36–48)
HEMOGLOBIN: 13.4 G/DL (ref 12–16)
LYMPHOCYTE %: 37.6 %
MCH RBC QN AUTO: 28.9 PG (ref 27–34)
MCHC RBC AUTO-ENTMCNC: 33.7 G/DL (ref 31–36)
MCV RBC AUTO: 86 FL (ref 80–100)
MONOCYTES # BLD: 8.1 %
NEUTROPHILS RELATIVE PERCENT: 48.9 %
PDW BLD-RTO: 13.6 % (ref 10.8–14.8)
PLATELETS: 353 K/UL (ref 150–450)
RBC: 4.63 M/UL (ref 4–5.5)
WBC: 5.3 K/UL (ref 3.7–10.8)

## 2019-06-04 DIAGNOSIS — E78.5 HYPERLIPIDEMIA LDL GOAL <70: Primary | ICD-10-CM

## 2019-06-04 LAB
AVERAGE GLUCOSE: 105 MG/DL (ref 66–114)
CHOLESTEROL/HDL RATIO: 4 (ref 1–5)
CHOLESTEROL: 247 MG/DL (ref 150–200)
HBA1C MFR BLD: 5.3 %
HDLC SERPL-MCNC: 62 MG/DL
LDL CHOLESTEROL CALCULATED: 166 MG/DL
LDL/HDL RATIO: 2.7
TRIGL SERPL-MCNC: 94 MG/DL (ref 27–150)
TSH SERPL DL<=0.05 MIU/L-ACNC: 4.18 UIU/ML (ref 0.49–4.67)
VLDLC SERPL CALC-MCNC: 19 MG/DL (ref 0–30)

## 2019-06-05 DIAGNOSIS — I25.10 CORONARY ARTERY DISEASE INVOLVING NATIVE CORONARY ARTERY OF NATIVE HEART WITHOUT ANGINA PECTORIS: ICD-10-CM

## 2019-06-05 RX ORDER — OMEPRAZOLE 40 MG/1
CAPSULE, DELAYED RELEASE ORAL
Qty: 90 CAPSULE | Refills: 3 | Status: SHIPPED | OUTPATIENT
Start: 2019-06-05 | End: 2021-06-02 | Stop reason: SDUPTHER

## 2019-06-06 ENCOUNTER — HOSPITAL ENCOUNTER (OUTPATIENT)
Dept: WOMENS IMAGING | Age: 64
Discharge: HOME | End: 2019-06-06
Payer: MEDICARE

## 2019-06-06 DIAGNOSIS — Z12.31 ENCOUNTER FOR SCREENING MAMMOGRAM FOR BREAST CANCER: ICD-10-CM

## 2019-06-06 DIAGNOSIS — Z78.0 POSTMENOPAUSAL: ICD-10-CM

## 2019-06-06 PROCEDURE — 77080 DXA BONE DENSITY AXIAL: CPT

## 2019-06-06 PROCEDURE — 77063 BREAST TOMOSYNTHESIS BI: CPT

## 2019-06-27 ENCOUNTER — TELEPHONE (OUTPATIENT)
Dept: FAMILY MEDICINE CLINIC | Age: 64
End: 2019-06-27

## 2019-07-25 RX ORDER — LISINOPRIL 5 MG/1
TABLET ORAL
Qty: 90 TABLET | Refills: 3 | Status: SHIPPED | OUTPATIENT
Start: 2019-07-25 | End: 2020-06-29 | Stop reason: SDUPTHER

## 2019-08-05 ENCOUNTER — OFFICE VISIT (OUTPATIENT)
Dept: FAMILY MEDICINE CLINIC | Age: 64
End: 2019-08-05
Payer: MEDICARE

## 2019-08-05 VITALS
SYSTOLIC BLOOD PRESSURE: 146 MMHG | DIASTOLIC BLOOD PRESSURE: 92 MMHG | TEMPERATURE: 100.1 F | RESPIRATION RATE: 16 BRPM | BODY MASS INDEX: 44.86 KG/M2 | WEIGHT: 269.6 LBS | OXYGEN SATURATION: 95 % | HEART RATE: 72 BPM

## 2019-08-05 DIAGNOSIS — R50.9 FEBRILE ILLNESS, ACUTE: ICD-10-CM

## 2019-08-05 DIAGNOSIS — R11.0 NAUSEA: ICD-10-CM

## 2019-08-05 DIAGNOSIS — J01.40 ACUTE NON-RECURRENT PANSINUSITIS: Primary | ICD-10-CM

## 2019-08-05 LAB
INFLUENZA A ANTIBODY: NORMAL
INFLUENZA B ANTIBODY: NORMAL

## 2019-08-05 PROCEDURE — 3017F COLORECTAL CA SCREEN DOC REV: CPT | Performed by: NURSE PRACTITIONER

## 2019-08-05 PROCEDURE — 1036F TOBACCO NON-USER: CPT | Performed by: NURSE PRACTITIONER

## 2019-08-05 PROCEDURE — 4040F PNEUMOC VAC/ADMIN/RCVD: CPT | Performed by: NURSE PRACTITIONER

## 2019-08-05 PROCEDURE — 1123F ACP DISCUSS/DSCN MKR DOCD: CPT | Performed by: NURSE PRACTITIONER

## 2019-08-05 PROCEDURE — G8427 DOCREV CUR MEDS BY ELIG CLIN: HCPCS | Performed by: NURSE PRACTITIONER

## 2019-08-05 PROCEDURE — G8399 PT W/DXA RESULTS DOCUMENT: HCPCS | Performed by: NURSE PRACTITIONER

## 2019-08-05 PROCEDURE — 87804 INFLUENZA ASSAY W/OPTIC: CPT | Performed by: NURSE PRACTITIONER

## 2019-08-05 PROCEDURE — G8417 CALC BMI ABV UP PARAM F/U: HCPCS | Performed by: NURSE PRACTITIONER

## 2019-08-05 PROCEDURE — 99213 OFFICE O/P EST LOW 20 MIN: CPT | Performed by: NURSE PRACTITIONER

## 2019-08-05 PROCEDURE — 1090F PRES/ABSN URINE INCON ASSESS: CPT | Performed by: NURSE PRACTITIONER

## 2019-08-05 PROCEDURE — G8598 ASA/ANTIPLAT THER USED: HCPCS | Performed by: NURSE PRACTITIONER

## 2019-08-05 RX ORDER — ONDANSETRON 4 MG/1
4 TABLET, ORALLY DISINTEGRATING ORAL 3 TIMES DAILY PRN
Qty: 15 TABLET | Refills: 0 | Status: SHIPPED | OUTPATIENT
Start: 2019-08-05 | End: 2019-08-06 | Stop reason: SDUPTHER

## 2019-08-05 RX ORDER — AMOXICILLIN AND CLAVULANATE POTASSIUM 875; 125 MG/1; MG/1
1 TABLET, FILM COATED ORAL 2 TIMES DAILY WITH MEALS
Qty: 14 TABLET | Refills: 0 | Status: SHIPPED | OUTPATIENT
Start: 2019-08-05 | End: 2019-08-12

## 2019-08-05 ASSESSMENT — ENCOUNTER SYMPTOMS
SINUS PAIN: 1
RHINORRHEA: 1
COUGH: 0
SHORTNESS OF BREATH: 0
ABDOMINAL PAIN: 0
VOMITING: 1
DIARRHEA: 0
NAUSEA: 1
SINUS PRESSURE: 1

## 2019-08-05 NOTE — PROGRESS NOTES
Visit Information    Have you changed or started any medications since your last visit including any over-the-counter medicines, vitamins, or herbal medicines? no   Are you having any side effects from any of your medications? -  no  Have you stopped taking any of your medications? Is so, why? -  yes - medication list updated    Have you seen any other physician or provider since your last visit? No  Have you had any other diagnostic tests since your last visit? Records obtained  Have you been seen in the emergency room and/or had an admission to a hospital since we last saw you? No  Have you had your routine dental cleaning in the past 6 months? no    Have you activated your [employer] account? If not, what are your barriers?  Yes     Patient Care Team:  Brenden Oden DO as PCP - General (Family Medicine)  Brenden Oden DO as PCP - [facility]  Renetta Hobbs DPM as Consulting Physician (Podiatry)  KELIN Briones - CNP as Nurse Practitioner (Nurse Practitioner)    Medical History Review  Past Medical, Family, and Social History reviewed and does not contribute to the patient presenting condition    Health Maintenance   Topic Date Due    HIV screen  01/17/1969    DTaP/Tdap/Td vaccine (1 - Tdap) 01/17/1973    Shingles Vaccine (1 of 2) 01/17/2004    Annual Wellness Visit (AWV)  01/17/2017    Cervical cancer screen  01/27/2018    Pneumococcal 65+ years Vaccine (1 of 2 - PCV13) 01/17/2019    Flu vaccine (1) 09/01/2019    Potassium monitoring  01/17/2020    Creatinine monitoring  01/17/2020    A1C test (Diabetic or Prediabetic)  06/03/2020    TSH testing  06/03/2020    Colon cancer screen colonoscopy  08/01/2020    Breast cancer screen  06/06/2021    Lipid screen  06/03/2024    DEXA (modify frequency per FRAX score)  Completed    Hepatitis C screen  Completed

## 2019-08-06 ENCOUNTER — TELEPHONE (OUTPATIENT)
Dept: FAMILY MEDICINE CLINIC | Age: 64
End: 2019-08-06

## 2019-08-06 DIAGNOSIS — R11.0 NAUSEA: ICD-10-CM

## 2019-08-06 DIAGNOSIS — M25.50 POLYARTHRALGIA: ICD-10-CM

## 2019-08-06 RX ORDER — IBUPROFEN 600 MG/1
TABLET ORAL
Qty: 360 TABLET | Refills: 0 | Status: SHIPPED | OUTPATIENT
Start: 2019-08-06 | End: 2020-01-13 | Stop reason: SDUPTHER

## 2019-08-06 RX ORDER — ONDANSETRON 4 MG/1
TABLET, ORALLY DISINTEGRATING ORAL
Qty: 20 TABLET | Refills: 0 | Status: SHIPPED | OUTPATIENT
Start: 2019-08-06 | End: 2019-12-23 | Stop reason: SDUPTHER

## 2019-08-06 RX ORDER — BUTALBITAL, ACETAMINOPHEN AND CAFFEINE 50; 325; 40 MG/1; MG/1; MG/1
1 TABLET ORAL EVERY 4 HOURS PRN
Qty: 15 TABLET | Refills: 0 | Status: SHIPPED | OUTPATIENT
Start: 2019-08-06 | End: 2019-08-22 | Stop reason: ALTCHOICE

## 2019-08-06 NOTE — TELEPHONE ENCOUNTER
Patient calling due to no improvement in s/s since appt yesterday. She states her headache is worse and no relief with Tylenol. She is taking Zofran 4 times daily with mild relief for only a few few hours at a time.   Please advise

## 2019-08-07 ENCOUNTER — HOSPITAL ENCOUNTER (EMERGENCY)
Age: 64
Discharge: HOME | End: 2019-08-07
Attending: EMERGENCY MEDICINE
Payer: MEDICARE

## 2019-08-07 ENCOUNTER — TELEPHONE (OUTPATIENT)
Dept: FAMILY MEDICINE CLINIC | Age: 64
End: 2019-08-07

## 2019-08-07 ENCOUNTER — APPOINTMENT (OUTPATIENT)
Dept: GENERAL RADIOLOGY | Age: 64
End: 2019-08-07
Payer: MEDICARE

## 2019-08-07 VITALS
TEMPERATURE: 98 F | OXYGEN SATURATION: 95 % | DIASTOLIC BLOOD PRESSURE: 68 MMHG | HEART RATE: 72 BPM | RESPIRATION RATE: 16 BRPM | SYSTOLIC BLOOD PRESSURE: 147 MMHG

## 2019-08-07 DIAGNOSIS — N10 ACUTE PYELONEPHRITIS: Primary | ICD-10-CM

## 2019-08-07 LAB
ALBUMIN SERPL-MCNC: 3.3 G/DL (ref 3.5–5.1)
ALP BLD-CCNC: 185 U/L (ref 38–126)
ALT SERPL-CCNC: 85 U/L (ref 11–66)
AMORPHOUS: ABNORMAL
ANION GAP SERPL CALCULATED.3IONS-SCNC: 12 MEQ/L (ref 8–16)
AST SERPL-CCNC: 100 U/L (ref 5–40)
BACTERIA: ABNORMAL /HPF
BASOPHILS # BLD: 0.2 %
BASOPHILS ABSOLUTE: 0 THOU/MM3 (ref 0–0.1)
BILIRUB SERPL-MCNC: 0.7 MG/DL (ref 0.3–1.2)
BILIRUBIN URINE: ABNORMAL
BLOOD, URINE: ABNORMAL
BUN BLDV-MCNC: 13 MG/DL (ref 7–22)
CALCIUM SERPL-MCNC: 8.9 MG/DL (ref 8.5–10.5)
CASTS 2: ABNORMAL /LPF
CASTS UA: ABNORMAL /LPF
CHARACTER, URINE: ABNORMAL
CHLORIDE BLD-SCNC: 101 MEQ/L (ref 98–111)
CO2: 25 MEQ/L (ref 23–33)
COLOR: ABNORMAL
CREAT SERPL-MCNC: 0.6 MG/DL (ref 0.4–1.2)
CRYSTALS, UA: ABNORMAL
EKG ATRIAL RATE: 79 BPM
EKG P AXIS: 72 DEGREES
EKG P-R INTERVAL: 166 MS
EKG Q-T INTERVAL: 382 MS
EKG QRS DURATION: 96 MS
EKG QTC CALCULATION (BAZETT): 438 MS
EKG R AXIS: 13 DEGREES
EKG T AXIS: 67 DEGREES
EKG VENTRICULAR RATE: 79 BPM
EOSINOPHIL # BLD: 0.1 %
EOSINOPHILS ABSOLUTE: 0 THOU/MM3 (ref 0–0.4)
EPITHELIAL CELLS, UA: ABNORMAL /HPF
ERYTHROCYTE [DISTWIDTH] IN BLOOD BY AUTOMATED COUNT: 13.6 % (ref 11.5–14.5)
ERYTHROCYTE [DISTWIDTH] IN BLOOD BY AUTOMATED COUNT: 44.6 FL (ref 35–45)
FLU A ANTIGEN: NEGATIVE
FLU B ANTIGEN: NEGATIVE
GFR SERPL CREATININE-BSD FRML MDRD: > 90 ML/MIN/1.73M2
GLUCOSE BLD-MCNC: 115 MG/DL (ref 70–108)
GLUCOSE URINE: NEGATIVE MG/DL
GROUP A STREP CULTURE, REFLEX: NEGATIVE
HCT VFR BLD CALC: 38.5 % (ref 37–47)
HEMOGLOBIN: 12.6 GM/DL (ref 12–16)
ICTOTEST: NEGATIVE
IMMATURE GRANS (ABS): 0.07 THOU/MM3 (ref 0–0.07)
IMMATURE GRANULOCYTES: 1 %
KETONES, URINE: 15
LEUKOCYTE ESTERASE, URINE: ABNORMAL
LYMPHOCYTES # BLD: 10.1 %
LYMPHOCYTES ABSOLUTE: 1.3 THOU/MM3 (ref 1–4.8)
MAGNESIUM: 1.7 MG/DL (ref 1.6–2.4)
MCH RBC QN AUTO: 29.6 PG (ref 26–33)
MCHC RBC AUTO-ENTMCNC: 32.7 GM/DL (ref 32.2–35.5)
MCV RBC AUTO: 90.4 FL (ref 81–99)
MISCELLANEOUS 2: ABNORMAL
MONOCYTES # BLD: 11.9 %
MONOCYTES ABSOLUTE: 1.5 THOU/MM3 (ref 0.4–1.3)
MUCUS: ABNORMAL
NITRITE, URINE: POSITIVE
NUCLEATED RED BLOOD CELLS: 0 /100 WBC
OSMOLALITY CALCULATION: 276.7 MOSMOL/KG (ref 275–300)
PH UA: 6 (ref 5–9)
PLATELET # BLD: 269 THOU/MM3 (ref 130–400)
PMV BLD AUTO: 9.1 FL (ref 9.4–12.4)
POTASSIUM REFLEX MAGNESIUM: 3.1 MEQ/L (ref 3.5–5.2)
PROTEIN UA: 300
RBC # BLD: 4.26 MILL/MM3 (ref 4.2–5.4)
RBC URINE: ABNORMAL /HPF
REFLEX THROAT C + S: NORMAL
RENAL EPITHELIAL, UA: ABNORMAL
SEG NEUTROPHILS: 77.1 %
SEGMENTED NEUTROPHILS ABSOLUTE COUNT: 9.8 THOU/MM3 (ref 1.8–7.7)
SODIUM BLD-SCNC: 138 MEQ/L (ref 135–145)
SPECIFIC GRAVITY, URINE: > 1.03 (ref 1–1.03)
TOTAL PROTEIN: 7.1 G/DL (ref 6.1–8)
UROBILINOGEN, URINE: 4 EU/DL (ref 0–1)
WBC # BLD: 12.7 THOU/MM3 (ref 4.8–10.8)
WBC UA: ABNORMAL /HPF
YEAST: PRESENT

## 2019-08-07 PROCEDURE — 85025 COMPLETE CBC W/AUTO DIFF WBC: CPT

## 2019-08-07 PROCEDURE — 87880 STREP A ASSAY W/OPTIC: CPT

## 2019-08-07 PROCEDURE — 6370000000 HC RX 637 (ALT 250 FOR IP): Performed by: EMERGENCY MEDICINE

## 2019-08-07 PROCEDURE — 81001 URINALYSIS AUTO W/SCOPE: CPT

## 2019-08-07 PROCEDURE — 71046 X-RAY EXAM CHEST 2 VIEWS: CPT

## 2019-08-07 PROCEDURE — 87804 INFLUENZA ASSAY W/OPTIC: CPT

## 2019-08-07 PROCEDURE — 51798 US URINE CAPACITY MEASURE: CPT

## 2019-08-07 PROCEDURE — 87086 URINE CULTURE/COLONY COUNT: CPT

## 2019-08-07 PROCEDURE — 80053 COMPREHEN METABOLIC PANEL: CPT

## 2019-08-07 PROCEDURE — 96375 TX/PRO/DX INJ NEW DRUG ADDON: CPT

## 2019-08-07 PROCEDURE — 83735 ASSAY OF MAGNESIUM: CPT

## 2019-08-07 PROCEDURE — 96374 THER/PROPH/DIAG INJ IV PUSH: CPT

## 2019-08-07 PROCEDURE — 87070 CULTURE OTHR SPECIMN AEROBIC: CPT

## 2019-08-07 PROCEDURE — 99283 EMERGENCY DEPT VISIT LOW MDM: CPT

## 2019-08-07 PROCEDURE — 36415 COLL VENOUS BLD VENIPUNCTURE: CPT

## 2019-08-07 PROCEDURE — 6360000002 HC RX W HCPCS: Performed by: EMERGENCY MEDICINE

## 2019-08-07 PROCEDURE — 93005 ELECTROCARDIOGRAM TRACING: CPT | Performed by: EMERGENCY MEDICINE

## 2019-08-07 RX ORDER — BENZONATATE 100 MG/1
100 CAPSULE ORAL 3 TIMES DAILY PRN
COMMUNITY
End: 2019-08-22 | Stop reason: ALTCHOICE

## 2019-08-07 RX ORDER — METOCLOPRAMIDE HYDROCHLORIDE 5 MG/ML
10 INJECTION INTRAMUSCULAR; INTRAVENOUS ONCE
Status: COMPLETED | OUTPATIENT
Start: 2019-08-07 | End: 2019-08-07

## 2019-08-07 RX ORDER — IBUPROFEN 400 MG/1
400 TABLET ORAL EVERY 8 HOURS PRN
Qty: 20 TABLET | Refills: 0 | Status: SHIPPED | OUTPATIENT
Start: 2019-08-07 | End: 2019-08-22 | Stop reason: DRUGHIGH

## 2019-08-07 RX ORDER — CEPHALEXIN 500 MG/1
500 CAPSULE ORAL 4 TIMES DAILY
Qty: 40 CAPSULE | Refills: 0 | Status: SHIPPED | OUTPATIENT
Start: 2019-08-07 | End: 2019-08-17

## 2019-08-07 RX ORDER — KETOROLAC TROMETHAMINE 30 MG/ML
15 INJECTION, SOLUTION INTRAMUSCULAR; INTRAVENOUS ONCE
Status: COMPLETED | OUTPATIENT
Start: 2019-08-07 | End: 2019-08-07

## 2019-08-07 RX ORDER — CEPHALEXIN 250 MG/1
500 CAPSULE ORAL ONCE
Status: COMPLETED | OUTPATIENT
Start: 2019-08-07 | End: 2019-08-07

## 2019-08-07 RX ADMIN — CEPHALEXIN 500 MG: 250 CAPSULE ORAL at 13:01

## 2019-08-07 RX ADMIN — KETOROLAC TROMETHAMINE 15 MG: 30 INJECTION, SOLUTION INTRAMUSCULAR at 14:14

## 2019-08-07 RX ADMIN — METOCLOPRAMIDE 10 MG: 5 INJECTION, SOLUTION INTRAMUSCULAR; INTRAVENOUS at 14:14

## 2019-08-07 ASSESSMENT — ENCOUNTER SYMPTOMS
SORE THROAT: 1
BLOOD IN STOOL: 0
VOICE CHANGE: 0
EYE PAIN: 0
ABDOMINAL PAIN: 0
CHEST TIGHTNESS: 0
SINUS PRESSURE: 1
NAUSEA: 0
DIARRHEA: 0
BACK PAIN: 0
SHORTNESS OF BREATH: 0
SINUS PAIN: 1
CONSTIPATION: 0
COUGH: 1
RECTAL PAIN: 0

## 2019-08-07 ASSESSMENT — PAIN SCALES - GENERAL: PAINLEVEL_OUTOF10: 4

## 2019-08-07 NOTE — ED TRIAGE NOTES
Pt reports to the ED with complaints of generalized body aches and new weakness in legs. Pt is having coughing that progresses to dry heaves but nothing comes up. Pt reports not peeing in 4 days. When she does have some urine output it is when she coughs and it is incontinence. She stated that she changed her brief today and it was brown inside. Pt reports no pain at the moment. Pt has no appetite. Pt states this all started on Sunday. Rales are present on the right side of the lung bases per report of Dr Don Elliott. She is alert and oriented times 4.

## 2019-08-07 NOTE — ED PROVIDER NOTES
COLONOSCOPY      HEEL SPUR SURGERY      HYSTERECTOMY      KNEE SURGERY  2009    TOTAL THYROIDECTOMY      UPPER GASTROINTESTINAL ENDOSCOPY  2009           Current Facility-Administered Medications:     metoclopramide (REGLAN) injection 10 mg, 10 mg, Intravenous, Once, Mason Woods MD    ketorolac (TORADOL) injection 15 mg, 15 mg, Intravenous, Once, Mason Woods MD    Current Outpatient Medications:     benzonatate (TESSALON) 100 MG capsule, Take 100 mg by mouth 3 times daily as needed for Cough, Disp: , Rfl:     cephALEXin (KEFLEX) 500 MG capsule, Take 1 capsule by mouth 4 times daily for 10 days, Disp: 40 capsule, Rfl: 0    ibuprofen (IBU) 400 MG tablet, Take 1 tablet by mouth every 8 hours as needed for Pain or Fever, Disp: 20 tablet, Rfl: 0    ibuprofen (ADVIL;MOTRIN) 600 MG tablet, TAKE 1 TABLET EVERY 6 HOURS AS NEEDED FOR PAIN, Disp: 360 tablet, Rfl: 0    ondansetron (ZOFRAN-ODT) 4 MG disintegrating tablet, DISSOLVE 1 TABLET ON THE TONGUE EVERY 8 HOURS AS NEEDED FOR NAUSEA, Disp: 20 tablet, Rfl: 0    butalbital-acetaminophen-caffeine (FIORICET, ESGIC) -40 MG per tablet, Take 1 tablet by mouth every 4 hours as needed for Headaches, Disp: 15 tablet, Rfl: 0    amoxicillin-clavulanate (AUGMENTIN) 875-125 MG per tablet, Take 1 tablet by mouth 2 times daily (with meals) for 7 days, Disp: 14 tablet, Rfl: 0    lisinopril (PRINIVIL;ZESTRIL) 5 MG tablet, TAKE 1 TABLET BY MOUTH EVERY DAY, Disp: 90 tablet, Rfl: 3    omeprazole (PRILOSEC) 40 MG delayed release capsule, TAKE 1 CAPSULE BY MOUTH DAILY WITH BREAKFAST, Disp: 90 capsule, Rfl: 3    metoprolol tartrate (LOPRESSOR) 25 MG tablet, TAKE ONE-HALF TABLET BY MOUTH TWICE DAILY, Disp: 90 tablet, Rfl: 3    atorvastatin (LIPITOR) 40 MG tablet, Take 1 tablet by mouth daily, Disp: 90 tablet, Rfl: 3    Cholecalciferol (VITAMIN D PO), Take by mouth daily , Disp: , Rfl:     aspirin 81 MG tablet, Take 81 mg by mouth daily, Disp: , Rfl:    DiphenhydrAMINE HCl (BENADRYL ALLERGY PO), Take by mouth as needed, Disp: , Rfl:       Social History     Social History Narrative    Not on file     Social History     Tobacco Use    Smoking status: Never Smoker    Smokeless tobacco: Never Used   Substance Use Topics    Alcohol use: No    Drug use: No         No Known Allergies      Family History   Problem Relation Age of Onset    Arthritis Mother     Other Mother         over weight    Other Father         over weight    Diabetes Brother     Other Brother         over [employer]    Other Maternal Grandmother         over weight    Other Maternal Grandfather         over weight    Other Paternal Grandmother         over weight    Other Paternal Grandfather         over weight         Previous records reviewed: Patient seen at her PCP 2 days ago, see HPI for more details. Patient also called her PCP office today and was advised to come to the emergency department after she requested it. .      Vitals:    08/07/19 1302   BP: 137/72   Pulse: 84   Resp: 16   SpO2: 96%     Vital signs and nursing assessment reviewed and abnormal from Mild hypertension. Pulsoximetry is normal per my interpretation. Physical Exam   Constitutional: She is oriented to person, place, and time. She appears well-developed and well-nourished. No distress. Obese female   HENT:   Head: Normocephalic and atraumatic. Right Ear: External ear normal.   Left Ear: External ear normal.   Nose: Nose normal.   Mouth/Throat: Uvula is midline and mucous membranes are normal. No uvula swelling. Posterior oropharyngeal erythema present. No oropharyngeal exudate. Eyes: Pupils are equal, round, and reactive to light. Conjunctivae and EOM are normal.   Neck: Neck supple. No JVD present. Cardiovascular: Normal rate, regular rhythm and normal heart sounds. Exam reveals no gallop and no friction rub. No murmur heard. Pulmonary/Chest: Effort normal. No stridor. She has no wheezes.  She has (has no administration in time range)   ketorolac (TORADOL) injection 15 mg (has no administration in time range)   cephALEXin (KEFLEX) capsule 500 mg (500 mg Oral Given 8/7/19 1301)         XR CHEST STANDARD (2 VW)   Final Result   Bibasilar opacities which may be on the basis of atelectasis or infiltrate. **This report has been created using voice recognition software. It may contain minor errors which are inherent in voice recognition technology. **      Final report electronically signed by Dr Sherman Shaver on 8/7/2019 11:37 AM            Final diagnoses:   Acute pyelonephritis              New Prescriptions    CEPHALEXIN (KEFLEX) 500 MG CAPSULE    Take 1 capsule by mouth 4 times daily for 10 days    IBUPROFEN (IBU) 400 MG TABLET    Take 1 tablet by mouth every 8 hours as needed for Pain or Fever         Condition: condition: good    Dispo: Discharge to home      This transcription was electronically signed. It was dictated by use of voice recognition software and electronically transcribed. The transcription may contain errors not detected in proofreading.        Kate Burleson MD  08/07/19 6483

## 2019-08-08 ENCOUNTER — TELEPHONE (OUTPATIENT)
Dept: FAMILY MEDICINE CLINIC | Age: 64
End: 2019-08-08

## 2019-08-08 DIAGNOSIS — R05.9 COUGH: ICD-10-CM

## 2019-08-08 DIAGNOSIS — N15.9 KIDNEY INFECTION: Primary | ICD-10-CM

## 2019-08-08 PROCEDURE — 93010 ELECTROCARDIOGRAM REPORT: CPT | Performed by: INTERNAL MEDICINE

## 2019-08-08 RX ORDER — BENZONATATE 100 MG/1
100-200 CAPSULE ORAL 3 TIMES DAILY PRN
Qty: 60 CAPSULE | Refills: 0 | Status: SHIPPED | OUTPATIENT
Start: 2019-08-08 | End: 2019-08-15

## 2019-08-08 RX ORDER — CIPROFLOXACIN 500 MG/1
500 TABLET, FILM COATED ORAL 2 TIMES DAILY
Qty: 20 TABLET | Refills: 0 | Status: SHIPPED | OUTPATIENT
Start: 2019-08-08 | End: 2019-08-15

## 2019-08-09 ENCOUNTER — CARE COORDINATION (OUTPATIENT)
Dept: CASE MANAGEMENT | Age: 64
End: 2019-08-09

## 2019-08-09 ENCOUNTER — TELEPHONE (OUTPATIENT)
Dept: FAMILY MEDICINE CLINIC | Age: 64
End: 2019-08-09

## 2019-08-09 LAB
THROAT/NOSE CULTURE: NORMAL
URINE CULTURE REFLEX: NORMAL

## 2019-08-09 NOTE — TELEPHONE ENCOUNTER
The patient called in and stated that she was in on 8/5 and was put on an ATB for a sinus infection. She stated that she was feeling so bad on 8/7 she went to the ED and was DX with a UTI and pneumonia. She stated that we had changed her ATB to Cipro and that the ED put her on Kelfex. She stated that she was not able to take the Keflex due to stomach upset. The patient stated that she does not feel any better and has not had a temp below 101.0 since last weekend. Advised the patient to return to the ED for further evaluation.   GERDA

## 2019-08-12 ENCOUNTER — CARE COORDINATION (OUTPATIENT)
Dept: CASE MANAGEMENT | Age: 64
End: 2019-08-12

## 2019-08-15 ENCOUNTER — OFFICE VISIT (OUTPATIENT)
Dept: FAMILY MEDICINE CLINIC | Age: 64
End: 2019-08-15
Payer: MEDICARE

## 2019-08-15 ENCOUNTER — HOSPITAL ENCOUNTER (OUTPATIENT)
Dept: GENERAL RADIOLOGY | Age: 64
Discharge: HOME | End: 2019-08-15
Payer: MEDICARE

## 2019-08-15 ENCOUNTER — HOSPITAL ENCOUNTER (OUTPATIENT)
Age: 64
Discharge: HOME | End: 2019-08-15
Payer: MEDICARE

## 2019-08-15 VITALS
HEART RATE: 76 BPM | SYSTOLIC BLOOD PRESSURE: 120 MMHG | RESPIRATION RATE: 15 BRPM | OXYGEN SATURATION: 96 % | BODY MASS INDEX: 44.66 KG/M2 | DIASTOLIC BLOOD PRESSURE: 80 MMHG | WEIGHT: 268.4 LBS

## 2019-08-15 DIAGNOSIS — E89.0 HISTORY OF TOTAL THYROIDECTOMY: ICD-10-CM

## 2019-08-15 DIAGNOSIS — R05.8 PRODUCTIVE COUGH: ICD-10-CM

## 2019-08-15 DIAGNOSIS — R82.998 DARK URINE: ICD-10-CM

## 2019-08-15 DIAGNOSIS — R74.8 ELEVATED ALKALINE PHOSPHATASE LEVEL: ICD-10-CM

## 2019-08-15 DIAGNOSIS — K21.9 GASTROESOPHAGEAL REFLUX DISEASE, ESOPHAGITIS PRESENCE NOT SPECIFIED: ICD-10-CM

## 2019-08-15 DIAGNOSIS — E03.9 HYPOTHYROIDISM, UNSPECIFIED TYPE: ICD-10-CM

## 2019-08-15 DIAGNOSIS — I25.10 CORONARY ARTERY DISEASE INVOLVING NATIVE CORONARY ARTERY OF NATIVE HEART WITHOUT ANGINA PECTORIS: ICD-10-CM

## 2019-08-15 DIAGNOSIS — D72.829 LEUKOCYTOSIS, UNSPECIFIED TYPE: ICD-10-CM

## 2019-08-15 DIAGNOSIS — R79.89 ELEVATED LFTS: Primary | ICD-10-CM

## 2019-08-15 DIAGNOSIS — E78.5 HYPERLIPIDEMIA LDL GOAL <70: ICD-10-CM

## 2019-08-15 DIAGNOSIS — R79.89 ELEVATED LFTS: ICD-10-CM

## 2019-08-15 DIAGNOSIS — R10.32 LLQ ABDOMINAL PAIN: ICD-10-CM

## 2019-08-15 LAB
ALBUMIN SERPL-MCNC: 3.5 G/DL (ref 3.5–5.1)
ALP BLD-CCNC: 204 U/L (ref 38–126)
ALT SERPL-CCNC: 50 U/L (ref 11–66)
AST SERPL-CCNC: 31 U/L (ref 5–40)
BASOPHILS # BLD: 0.6 %
BASOPHILS ABSOLUTE: 0 THOU/MM3 (ref 0–0.1)
BILIRUB SERPL-MCNC: 0.2 MG/DL (ref 0.3–1.2)
BILIRUBIN DIRECT: < 0.2 MG/DL (ref 0–0.3)
EOSINOPHIL # BLD: 2.9 %
EOSINOPHILS ABSOLUTE: 0.2 THOU/MM3 (ref 0–0.4)
ERYTHROCYTE [DISTWIDTH] IN BLOOD BY AUTOMATED COUNT: 13.5 % (ref 11.5–14.5)
ERYTHROCYTE [DISTWIDTH] IN BLOOD BY AUTOMATED COUNT: 46.1 FL (ref 35–45)
GAMMA GLUTAMYL TRANSFERASE: 84 U/L (ref 8–69)
HAV IGM SER IA-ACNC: NEGATIVE
HCT VFR BLD CALC: 41.6 % (ref 37–47)
HEMOGLOBIN: 13.1 GM/DL (ref 12–16)
HEPATITIS B CORE IGM ANTIBODY: NEGATIVE
HEPATITIS B SURFACE ANTIGEN: NEGATIVE
HEPATITIS C ANTIBODY: NEGATIVE
IMMATURE GRANS (ABS): 0.1 THOU/MM3 (ref 0–0.07)
IMMATURE GRANULOCYTES: 1 %
LYMPHOCYTES # BLD: 32.1 %
LYMPHOCYTES ABSOLUTE: 2.6 THOU/MM3 (ref 1–4.8)
MCH RBC QN AUTO: 28.9 PG (ref 26–33)
MCHC RBC AUTO-ENTMCNC: 31.5 GM/DL (ref 32.2–35.5)
MCV RBC AUTO: 91.6 FL (ref 81–99)
MONOCYTES # BLD: 6.9 %
MONOCYTES ABSOLUTE: 0.6 THOU/MM3 (ref 0.4–1.3)
NUCLEATED RED BLOOD CELLS: 0 /100 WBC
PLATELET # BLD: 596 THOU/MM3 (ref 130–400)
PMV BLD AUTO: 8.7 FL (ref 9.4–12.4)
RBC # BLD: 4.54 MILL/MM3 (ref 4.2–5.4)
SEG NEUTROPHILS: 56.3 %
SEGMENTED NEUTROPHILS ABSOLUTE COUNT: 4.6 THOU/MM3 (ref 1.8–7.7)
TOTAL PROTEIN: 7.1 G/DL (ref 6.1–8)
WBC # BLD: 8.2 THOU/MM3 (ref 4.8–10.8)

## 2019-08-15 PROCEDURE — 3017F COLORECTAL CA SCREEN DOC REV: CPT | Performed by: FAMILY MEDICINE

## 2019-08-15 PROCEDURE — 71046 X-RAY EXAM CHEST 2 VIEWS: CPT

## 2019-08-15 PROCEDURE — 85025 COMPLETE CBC W/AUTO DIFF WBC: CPT

## 2019-08-15 PROCEDURE — 80074 ACUTE HEPATITIS PANEL: CPT

## 2019-08-15 PROCEDURE — 99214 OFFICE O/P EST MOD 30 MIN: CPT | Performed by: FAMILY MEDICINE

## 2019-08-15 PROCEDURE — 36415 COLL VENOUS BLD VENIPUNCTURE: CPT

## 2019-08-15 PROCEDURE — 1123F ACP DISCUSS/DSCN MKR DOCD: CPT | Performed by: FAMILY MEDICINE

## 2019-08-15 PROCEDURE — G8598 ASA/ANTIPLAT THER USED: HCPCS | Performed by: FAMILY MEDICINE

## 2019-08-15 PROCEDURE — G8427 DOCREV CUR MEDS BY ELIG CLIN: HCPCS | Performed by: FAMILY MEDICINE

## 2019-08-15 PROCEDURE — 1036F TOBACCO NON-USER: CPT | Performed by: FAMILY MEDICINE

## 2019-08-15 PROCEDURE — G8399 PT W/DXA RESULTS DOCUMENT: HCPCS | Performed by: FAMILY MEDICINE

## 2019-08-15 PROCEDURE — 82977 ASSAY OF GGT: CPT

## 2019-08-15 PROCEDURE — 1090F PRES/ABSN URINE INCON ASSESS: CPT | Performed by: FAMILY MEDICINE

## 2019-08-15 PROCEDURE — 80076 HEPATIC FUNCTION PANEL: CPT

## 2019-08-15 PROCEDURE — 4040F PNEUMOC VAC/ADMIN/RCVD: CPT | Performed by: FAMILY MEDICINE

## 2019-08-15 PROCEDURE — G8417 CALC BMI ABV UP PARAM F/U: HCPCS | Performed by: FAMILY MEDICINE

## 2019-08-15 RX ORDER — CALCIUM CARBONATE 300MG(750)
100 TABLET,CHEWABLE ORAL
COMMUNITY
Start: 2019-06-04

## 2019-08-15 RX ORDER — LEVOFLOXACIN 750 MG/1
750 TABLET ORAL DAILY
Qty: 5 TABLET | Refills: 0 | Status: SHIPPED | OUTPATIENT
Start: 2019-08-15 | End: 2019-08-20

## 2019-08-15 NOTE — PROGRESS NOTES
SURGERY  2009    TOTAL THYROIDECTOMY      UPPER GASTROINTESTINAL ENDOSCOPY  2009     Prior to Admission medications    Medication Sig Start Date End Date Taking? Authorizing Provider   levofloxacin (LEVAQUIN) 750 MG tablet Take 1 tablet by mouth daily for 5 days 8/15/19 8/20/19 Yes Candelaria Herron DO   benzonatate (TESSALON) 100 MG capsule Take 100 mg by mouth 3 times daily as needed for Cough   Yes Historical Provider, MD   ibuprofen (IBU) 400 MG tablet Take 1 tablet by mouth every 8 hours as needed for Pain or Fever 8/7/19  Yes Phi Kidd MD   ibuprofen (ADVIL;MOTRIN) 600 MG tablet TAKE 1 TABLET EVERY 6 HOURS AS NEEDED FOR PAIN 8/6/19  Yes Candelaria Herron DO   ondansetron (ZOFRAN-ODT) 4 MG disintegrating tablet DISSOLVE 1 TABLET ON THE TONGUE EVERY 8 HOURS AS NEEDED FOR NAUSEA 8/6/19  Yes Candelaria Herron DO   butalbital-acetaminophen-caffeine (FIORICET, ESGIC) -35 MG per tablet Take 1 tablet by mouth every 4 hours as needed for Headaches 8/6/19  Yes KELIN Polanco CNP   lisinopril (PRINIVIL;ZESTRIL) 5 MG tablet TAKE 1 TABLET BY MOUTH EVERY DAY 7/25/19  Yes Candelaria Herron DO   omeprazole (PRILOSEC) 40 MG delayed release capsule TAKE 1 CAPSULE BY MOUTH DAILY WITH BREAKFAST 6/5/19  Yes Candelaria Herron DO   metoprolol tartrate (LOPRESSOR) 25 MG tablet TAKE ONE-HALF TABLET BY MOUTH TWICE DAILY 6/5/19  Yes Candelaria Herron DO   DiphenhydrAMINE HCl (BENADRYL ALLERGY PO) Take by mouth as needed   Yes Historical Provider, MD   Cholecalciferol (VITAMIN D PO) Take by mouth daily    Yes Historical Provider, MD   aspirin 81 MG tablet Take 81 mg by mouth daily   Yes Historical Provider, MD   Magnesium 400 MG TABS  6/4/19   Historical Provider, MD       Review of Systems   Constitutional: Positive for fatigue. Negative for fever. HENT: Negative. Respiratory: Positive for cough. Cardiovascular: Negative. Gastrointestinal: Positive for abdominal pain and diarrhea.

## 2019-08-19 ASSESSMENT — ENCOUNTER SYMPTOMS
NAUSEA: 0
DIARRHEA: 1
ABDOMINAL PAIN: 1
VOMITING: 0
CONSTIPATION: 0
COUGH: 1

## 2019-08-20 ENCOUNTER — OFFICE VISIT (OUTPATIENT)
Dept: RHEUMATOLOGY | Age: 64
End: 2019-08-20
Payer: MEDICARE

## 2019-08-20 VITALS
WEIGHT: 268.3 LBS | OXYGEN SATURATION: 97 % | HEART RATE: 79 BPM | DIASTOLIC BLOOD PRESSURE: 82 MMHG | SYSTOLIC BLOOD PRESSURE: 120 MMHG | HEIGHT: 65 IN | BODY MASS INDEX: 44.7 KG/M2

## 2019-08-20 DIAGNOSIS — R80.9 PROTEINURIA, UNSPECIFIED TYPE: ICD-10-CM

## 2019-08-20 DIAGNOSIS — R53.83 OTHER FATIGUE: ICD-10-CM

## 2019-08-20 DIAGNOSIS — M19.071 OSTEOARTHRITIS OF BOTH FEET, UNSPECIFIED OSTEOARTHRITIS TYPE: ICD-10-CM

## 2019-08-20 DIAGNOSIS — M20.41 HAMMER TOES OF BOTH FEET: ICD-10-CM

## 2019-08-20 DIAGNOSIS — M19.072 OSTEOARTHRITIS OF BOTH FEET, UNSPECIFIED OSTEOARTHRITIS TYPE: ICD-10-CM

## 2019-08-20 DIAGNOSIS — M25.50 POLYARTHRALGIA: ICD-10-CM

## 2019-08-20 DIAGNOSIS — M16.0 OSTEOARTHRITIS OF BOTH HIPS, UNSPECIFIED OSTEOARTHRITIS TYPE: Primary | ICD-10-CM

## 2019-08-20 DIAGNOSIS — M47.816 SPONDYLOSIS OF LUMBAR REGION WITHOUT MYELOPATHY OR RADICULOPATHY: ICD-10-CM

## 2019-08-20 DIAGNOSIS — M20.42 HAMMER TOES OF BOTH FEET: ICD-10-CM

## 2019-08-20 PROCEDURE — G8417 CALC BMI ABV UP PARAM F/U: HCPCS | Performed by: INTERNAL MEDICINE

## 2019-08-20 PROCEDURE — 1090F PRES/ABSN URINE INCON ASSESS: CPT | Performed by: INTERNAL MEDICINE

## 2019-08-20 PROCEDURE — 99204 OFFICE O/P NEW MOD 45 MIN: CPT | Performed by: INTERNAL MEDICINE

## 2019-08-20 PROCEDURE — G8427 DOCREV CUR MEDS BY ELIG CLIN: HCPCS | Performed by: INTERNAL MEDICINE

## 2019-08-20 ASSESSMENT — ENCOUNTER SYMPTOMS
COUGH: 0
EYE PAIN: 0
CONSTIPATION: 0
VOMITING: 0
EYES NEGATIVE: 1
NAUSEA: 0
DIARRHEA: 0
WHEEZING: 0
EYE REDNESS: 0
SHORTNESS OF BREATH: 0

## 2019-08-20 NOTE — PROGRESS NOTES
 Other Father         over weight    Diabetes Brother     Other Brother         over weight    Other Maternal Grandmother         over weight    Other Maternal Grandfather         over weight    Other Paternal Grandmother         over weight    Other Paternal Grandfather         over weight       SOCIAL HISTORY      Social History     Tobacco History     Smoking Status  Never Smoker    Smokeless Tobacco Use  Never Used          Alcohol History     Alcohol Use Status  No          Drug Use     Drug Use Status  No          Sexual Activity     Sexually Active  Never                ALLERGIES   No Known Allergies    CURRENT MEDICATIONS      Current Outpatient Medications   Medication Sig Dispense Refill    Magnesium 400 MG TABS       levofloxacin (LEVAQUIN) 750 MG tablet Take 1 tablet by mouth daily for 5 days 5 tablet 0    benzonatate (TESSALON) 100 MG capsule Take 100 mg by mouth 3 times daily as needed for Cough      ibuprofen (IBU) 400 MG tablet Take 1 tablet by mouth every 8 hours as needed for Pain or Fever 20 tablet 0    ibuprofen (ADVIL;MOTRIN) 600 MG tablet TAKE 1 TABLET EVERY 6 HOURS AS NEEDED FOR PAIN 360 tablet 0    ondansetron (ZOFRAN-ODT) 4 MG disintegrating tablet DISSOLVE 1 TABLET ON THE TONGUE EVERY 8 HOURS AS NEEDED FOR NAUSEA 20 tablet 0    butalbital-acetaminophen-caffeine (FIORICET, ESGIC) -40 MG per tablet Take 1 tablet by mouth every 4 hours as needed for Headaches 15 tablet 0    lisinopril (PRINIVIL;ZESTRIL) 5 MG tablet TAKE 1 TABLET BY MOUTH EVERY DAY 90 tablet 3    omeprazole (PRILOSEC) 40 MG delayed release capsule TAKE 1 CAPSULE BY MOUTH DAILY WITH BREAKFAST 90 capsule 3    metoprolol tartrate (LOPRESSOR) 25 MG tablet TAKE ONE-HALF TABLET BY MOUTH TWICE DAILY 90 tablet 3    DiphenhydrAMINE HCl (BENADRYL ALLERGY PO) Take by mouth as needed      Cholecalciferol (VITAMIN D PO) Take by mouth daily       aspirin 81 MG tablet Take 81 mg by mouth daily       No current

## 2019-08-22 ENCOUNTER — OFFICE VISIT (OUTPATIENT)
Dept: FAMILY MEDICINE CLINIC | Age: 64
End: 2019-08-22
Payer: MEDICARE

## 2019-08-22 VITALS
HEIGHT: 65 IN | HEART RATE: 64 BPM | OXYGEN SATURATION: 97 % | DIASTOLIC BLOOD PRESSURE: 78 MMHG | BODY MASS INDEX: 45.52 KG/M2 | WEIGHT: 273.2 LBS | RESPIRATION RATE: 20 BRPM | SYSTOLIC BLOOD PRESSURE: 126 MMHG | TEMPERATURE: 97.9 F

## 2019-08-22 DIAGNOSIS — J18.9 PNEUMONIA OF RIGHT LOWER LOBE DUE TO INFECTIOUS ORGANISM: Primary | ICD-10-CM

## 2019-08-22 DIAGNOSIS — R74.8 ELEVATED SERUM GGT LEVEL: ICD-10-CM

## 2019-08-22 DIAGNOSIS — R82.998 DARK URINE: ICD-10-CM

## 2019-08-22 DIAGNOSIS — R74.8 ELEVATED ALKALINE PHOSPHATASE LEVEL: ICD-10-CM

## 2019-08-22 DIAGNOSIS — B37.31 VAGINA, CANDIDIASIS: ICD-10-CM

## 2019-08-22 DIAGNOSIS — R79.89 ELEVATED LFTS: ICD-10-CM

## 2019-08-22 PROCEDURE — G8598 ASA/ANTIPLAT THER USED: HCPCS | Performed by: FAMILY MEDICINE

## 2019-08-22 PROCEDURE — 1123F ACP DISCUSS/DSCN MKR DOCD: CPT | Performed by: FAMILY MEDICINE

## 2019-08-22 PROCEDURE — 4040F PNEUMOC VAC/ADMIN/RCVD: CPT | Performed by: FAMILY MEDICINE

## 2019-08-22 PROCEDURE — G8399 PT W/DXA RESULTS DOCUMENT: HCPCS | Performed by: FAMILY MEDICINE

## 2019-08-22 PROCEDURE — 1090F PRES/ABSN URINE INCON ASSESS: CPT | Performed by: FAMILY MEDICINE

## 2019-08-22 PROCEDURE — 3017F COLORECTAL CA SCREEN DOC REV: CPT | Performed by: FAMILY MEDICINE

## 2019-08-22 PROCEDURE — G8427 DOCREV CUR MEDS BY ELIG CLIN: HCPCS | Performed by: FAMILY MEDICINE

## 2019-08-22 PROCEDURE — G8417 CALC BMI ABV UP PARAM F/U: HCPCS | Performed by: FAMILY MEDICINE

## 2019-08-22 PROCEDURE — 1036F TOBACCO NON-USER: CPT | Performed by: FAMILY MEDICINE

## 2019-08-22 PROCEDURE — 99214 OFFICE O/P EST MOD 30 MIN: CPT | Performed by: FAMILY MEDICINE

## 2019-08-22 RX ORDER — FLUCONAZOLE 150 MG/1
150 TABLET ORAL ONCE
Qty: 2 TABLET | Refills: 0 | Status: SHIPPED | OUTPATIENT
Start: 2019-08-22 | End: 2019-08-22

## 2019-08-22 NOTE — PROGRESS NOTES
Subjective:      Patient ID: Nael Gonzalez is a 71 y.o. female. HPI:    Chief Complaint   Patient presents with    Follow-up     1wk follow up pneumonia     1 week eval.  She is feeling much better overall. She is still tired at times. Will do well for a few hours and then will have to sit down and take a break. No fevers. Cough is gone. Vitals:    08/22/19 1449   BP: 126/78   Pulse: 64   Resp: 20   Temp: 97.9 °F (36.6 °C)   SpO2: 97%     Urine clear. Denies abdominal pain. No dysuria, hematuria. She does believe that she has a yeast infection from the abx. Slight dc and itching. No pelvic pain. Recently seen by Rheum, labs pending. Patient Active Problem List   Diagnosis    CAD (coronary artery disease) 7/2014- mid lad 50 % patent LM< LCX and RCA    Hyperlipidemia LDL goal <70    Hypothyroid    History of total thyroidectomy    GERD (gastroesophageal reflux disease)    PUD (peptic ulcer disease)    IBS (irritable bowel syndrome)    IFG (impaired fasting glucose)    Vitamin D deficiency    SOB (shortness of breath) on exertion    Chest pain    Heart palpitations- intermittent    Abnormal nuclear stress test- anteroior and anteroseptal ischemia    Chest pain, atypical    Abdominal pain     Past Surgical History:   Procedure Laterality Date    CARDIAC CATHETERIZATION  2008    CARPAL TUNNEL RELEASE  2008    CHOLECYSTECTOMY      COLONOSCOPY      HAMMER TOE SURGERY Left     HEEL SPUR SURGERY      HYSTERECTOMY      KNEE SURGERY  2009    TOTAL THYROIDECTOMY      UPPER GASTROINTESTINAL ENDOSCOPY  2009     Prior to Admission medications    Medication Sig Start Date End Date Taking?  Authorizing Provider   Magnesium 400 MG TABS  6/4/19  Yes Historical Provider, MD   ibuprofen (ADVIL;MOTRIN) 600 MG tablet TAKE 1 TABLET EVERY 6 HOURS AS NEEDED FOR PAIN 8/6/19  Yes Slim Newman,    ondansetron (ZOFRAN-ODT) 4 MG disintegrating tablet DISSOLVE 1 TABLET ON THE TONGUE

## 2019-08-23 ASSESSMENT — ENCOUNTER SYMPTOMS
RESPIRATORY NEGATIVE: 1
GASTROINTESTINAL NEGATIVE: 1

## 2019-08-26 ENCOUNTER — HOSPITAL ENCOUNTER (OUTPATIENT)
Dept: GENERAL RADIOLOGY | Age: 64
Discharge: HOME | End: 2019-08-26
Payer: MEDICARE

## 2019-08-26 ENCOUNTER — HOSPITAL ENCOUNTER (OUTPATIENT)
Age: 64
Discharge: HOME | End: 2019-08-26
Payer: MEDICARE

## 2019-08-26 DIAGNOSIS — M25.50 POLYARTHRALGIA: ICD-10-CM

## 2019-08-26 DIAGNOSIS — R80.9 PROTEINURIA, UNSPECIFIED TYPE: ICD-10-CM

## 2019-08-26 DIAGNOSIS — R53.83 OTHER FATIGUE: ICD-10-CM

## 2019-08-26 LAB
ALBUMIN SERPL-MCNC: 3.8 G/DL (ref 3.5–5.1)
ALP BLD-CCNC: 112 U/L (ref 38–126)
ALT SERPL-CCNC: 16 U/L (ref 11–66)
ANION GAP SERPL CALCULATED.3IONS-SCNC: 11 MEQ/L (ref 8–16)
AST SERPL-CCNC: 15 U/L (ref 5–40)
BACTERIA: NORMAL
BASOPHILS # BLD: 1.9 %
BASOPHILS ABSOLUTE: 0.1 THOU/MM3 (ref 0–0.1)
BILIRUB SERPL-MCNC: 0.3 MG/DL (ref 0.3–1.2)
BILIRUBIN URINE: NEGATIVE
BLOOD, URINE: NEGATIVE
BUN BLDV-MCNC: 17 MG/DL (ref 7–22)
C-REACTIVE PROTEIN: 0.62 MG/DL (ref 0–1)
CALCIUM SERPL-MCNC: 9.2 MG/DL (ref 8.5–10.5)
CASTS: NORMAL /LPF
CASTS: NORMAL /LPF
CHARACTER, URINE: CLEAR
CHLORIDE BLD-SCNC: 105 MEQ/L (ref 98–111)
CO2: 27 MEQ/L (ref 23–33)
COLOR: YELLOW
CREAT SERPL-MCNC: 0.6 MG/DL (ref 0.4–1.2)
CREATININE URINE: 127.7 MG/DL
CRYSTALS: NORMAL
EOSINOPHIL # BLD: 4.3 %
EOSINOPHILS ABSOLUTE: 0.2 THOU/MM3 (ref 0–0.4)
EPITHELIAL CELLS, UA: NORMAL /HPF
ERYTHROCYTE [DISTWIDTH] IN BLOOD BY AUTOMATED COUNT: 13.9 % (ref 11.5–14.5)
ERYTHROCYTE [DISTWIDTH] IN BLOOD BY AUTOMATED COUNT: 47.8 FL (ref 35–45)
GFR SERPL CREATININE-BSD FRML MDRD: > 90 ML/MIN/1.73M2
GLUCOSE BLD-MCNC: 63 MG/DL (ref 70–108)
GLUCOSE, URINE: NEGATIVE MG/DL
HAV IGM SER IA-ACNC: NEGATIVE
HCT VFR BLD CALC: 40.2 % (ref 37–47)
HEMOGLOBIN: 12.5 GM/DL (ref 12–16)
HEPATITIS B CORE IGM ANTIBODY: NEGATIVE
HEPATITIS B SURFACE ANTIGEN: NEGATIVE
HEPATITIS C ANTIBODY: NEGATIVE
IMMATURE GRANS (ABS): 0.01 THOU/MM3 (ref 0–0.07)
IMMATURE GRANULOCYTES: 0 %
KETONES, URINE: NEGATIVE
LEUKOCYTE ESTERASE, URINE: NEGATIVE
LYMPHOCYTES # BLD: 43.1 %
LYMPHOCYTES ABSOLUTE: 2.3 THOU/MM3 (ref 1–4.8)
MCH RBC QN AUTO: 28.8 PG (ref 26–33)
MCHC RBC AUTO-ENTMCNC: 31.1 GM/DL (ref 32.2–35.5)
MCV RBC AUTO: 92.6 FL (ref 81–99)
MISCELLANEOUS LAB TEST RESULT: NORMAL
MONOCYTES # BLD: 8.5 %
MONOCYTES ABSOLUTE: 0.5 THOU/MM3 (ref 0.4–1.3)
NITRITE, URINE: NEGATIVE
NUCLEATED RED BLOOD CELLS: 0 /100 WBC
PH UA: 6 (ref 5–9)
PLATELET # BLD: 374 THOU/MM3 (ref 130–400)
PMV BLD AUTO: 9.6 FL (ref 9.4–12.4)
POTASSIUM SERPL-SCNC: 3.4 MEQ/L (ref 3.5–5.2)
PROT/CREAT RATIO, UR: 0.14
PROTEIN UA: NEGATIVE MG/DL
PROTEIN, URINE: 18.3 MG/DL
RBC # BLD: 4.34 MILL/MM3 (ref 4.2–5.4)
RBC URINE: NORMAL /HPF
RENAL EPITHELIAL, UA: NORMAL
SEDIMENTATION RATE, ERYTHROCYTE: 12 MM/HR (ref 0–20)
SEG NEUTROPHILS: 42 %
SEGMENTED NEUTROPHILS ABSOLUTE COUNT: 2.2 THOU/MM3 (ref 1.8–7.7)
SODIUM BLD-SCNC: 143 MEQ/L (ref 135–145)
SPECIFIC GRAVITY UA: 1.02 (ref 1–1.03)
TOTAL PROTEIN: 6.9 G/DL (ref 6.1–8)
URIC ACID: 3.5 MG/DL (ref 2.4–5.7)
UROBILINOGEN, URINE: 0.2 EU/DL (ref 0–1)
WBC # BLD: 5.3 THOU/MM3 (ref 4.8–10.8)
WBC UA: NORMAL /HPF
YEAST: NORMAL

## 2019-08-26 PROCEDURE — 83516 IMMUNOASSAY NONANTIBODY: CPT

## 2019-08-26 PROCEDURE — 85651 RBC SED RATE NONAUTOMATED: CPT

## 2019-08-26 PROCEDURE — 84156 ASSAY OF PROTEIN URINE: CPT

## 2019-08-26 PROCEDURE — 80053 COMPREHEN METABOLIC PANEL: CPT

## 2019-08-26 PROCEDURE — 85025 COMPLETE CBC W/AUTO DIFF WBC: CPT

## 2019-08-26 PROCEDURE — 86140 C-REACTIVE PROTEIN: CPT

## 2019-08-26 PROCEDURE — 72100 X-RAY EXAM L-S SPINE 2/3 VWS: CPT

## 2019-08-26 PROCEDURE — 84550 ASSAY OF BLOOD/URIC ACID: CPT

## 2019-08-26 PROCEDURE — 71046 X-RAY EXAM CHEST 2 VIEWS: CPT

## 2019-08-26 PROCEDURE — 36415 COLL VENOUS BLD VENIPUNCTURE: CPT

## 2019-08-26 PROCEDURE — 81001 URINALYSIS AUTO W/SCOPE: CPT

## 2019-08-26 PROCEDURE — 82570 ASSAY OF URINE CREATININE: CPT

## 2019-08-26 PROCEDURE — 86255 FLUORESCENT ANTIBODY SCREEN: CPT

## 2019-08-26 PROCEDURE — 80074 ACUTE HEPATITIS PANEL: CPT

## 2019-08-26 PROCEDURE — 73130 X-RAY EXAM OF HAND: CPT

## 2019-08-26 PROCEDURE — 86160 COMPLEMENT ANTIGEN: CPT

## 2019-08-28 LAB
C3 COMPLEMENT: 133 MG/DL (ref 88–201)
C4 COMPLEMENT: 30 MG/DL (ref 10–40)

## 2019-08-29 LAB
MYELOPEROXIDASE AB, IGG: 0 AU/ML (ref 0–19)
NEUTROPHIL CYTOPLASMIC AB IGG: NORMAL
SERINE PROTEASE 3, IGG: 0 AU/ML (ref 0–19)

## 2019-09-03 ENCOUNTER — TELEPHONE (OUTPATIENT)
Dept: RHEUMATOLOGY | Age: 64
End: 2019-09-03

## 2019-09-03 DIAGNOSIS — M25.50 POLYARTHRALGIA: Primary | ICD-10-CM

## 2019-09-04 RX ORDER — PREDNISONE 10 MG/1
TABLET ORAL
Qty: 15 TABLET | Refills: 0 | Status: SHIPPED | OUTPATIENT
Start: 2019-09-04 | End: 2019-09-14

## 2019-12-03 ENCOUNTER — OFFICE VISIT (OUTPATIENT)
Dept: FAMILY MEDICINE CLINIC | Age: 64
End: 2019-12-03
Payer: MEDICARE

## 2019-12-03 VITALS
HEIGHT: 65 IN | DIASTOLIC BLOOD PRESSURE: 90 MMHG | RESPIRATION RATE: 20 BRPM | HEART RATE: 68 BPM | WEIGHT: 279.8 LBS | SYSTOLIC BLOOD PRESSURE: 152 MMHG | BODY MASS INDEX: 46.62 KG/M2

## 2019-12-03 DIAGNOSIS — R79.89 ELEVATED LFTS: ICD-10-CM

## 2019-12-03 DIAGNOSIS — I25.10 CORONARY ARTERY DISEASE INVOLVING NATIVE CORONARY ARTERY OF NATIVE HEART WITHOUT ANGINA PECTORIS: ICD-10-CM

## 2019-12-03 DIAGNOSIS — I10 ESSENTIAL HYPERTENSION: ICD-10-CM

## 2019-12-03 DIAGNOSIS — M15.9 PRIMARY OSTEOARTHRITIS INVOLVING MULTIPLE JOINTS: ICD-10-CM

## 2019-12-03 DIAGNOSIS — S90.212A SUBUNGUAL HEMATOMA OF GREAT TOE OF LEFT FOOT, INITIAL ENCOUNTER: Primary | ICD-10-CM

## 2019-12-03 DIAGNOSIS — E78.5 HYPERLIPIDEMIA LDL GOAL <70: ICD-10-CM

## 2019-12-03 PROCEDURE — 99214 OFFICE O/P EST MOD 30 MIN: CPT | Performed by: FAMILY MEDICINE

## 2019-12-03 PROCEDURE — 4040F PNEUMOC VAC/ADMIN/RCVD: CPT | Performed by: FAMILY MEDICINE

## 2019-12-03 PROCEDURE — G8482 FLU IMMUNIZE ORDER/ADMIN: HCPCS | Performed by: FAMILY MEDICINE

## 2019-12-03 PROCEDURE — G8417 CALC BMI ABV UP PARAM F/U: HCPCS | Performed by: FAMILY MEDICINE

## 2019-12-03 PROCEDURE — 1090F PRES/ABSN URINE INCON ASSESS: CPT | Performed by: FAMILY MEDICINE

## 2019-12-03 PROCEDURE — G8399 PT W/DXA RESULTS DOCUMENT: HCPCS | Performed by: FAMILY MEDICINE

## 2019-12-03 PROCEDURE — G8427 DOCREV CUR MEDS BY ELIG CLIN: HCPCS | Performed by: FAMILY MEDICINE

## 2019-12-03 PROCEDURE — 1036F TOBACCO NON-USER: CPT | Performed by: FAMILY MEDICINE

## 2019-12-03 PROCEDURE — 1123F ACP DISCUSS/DSCN MKR DOCD: CPT | Performed by: FAMILY MEDICINE

## 2019-12-03 PROCEDURE — 3017F COLORECTAL CA SCREEN DOC REV: CPT | Performed by: FAMILY MEDICINE

## 2019-12-03 PROCEDURE — G8598 ASA/ANTIPLAT THER USED: HCPCS | Performed by: FAMILY MEDICINE

## 2019-12-03 RX ORDER — ATORVASTATIN CALCIUM 40 MG/1
40 TABLET, FILM COATED ORAL DAILY
Qty: 90 TABLET | Refills: 3 | Status: SHIPPED | OUTPATIENT
Start: 2019-12-03 | End: 2021-05-04 | Stop reason: ALTCHOICE

## 2019-12-03 RX ORDER — PREDNISONE 20 MG/1
20 TABLET ORAL 2 TIMES DAILY
Qty: 14 TABLET | Refills: 0 | Status: SHIPPED | OUTPATIENT
Start: 2019-12-03 | End: 2019-12-10

## 2019-12-03 ASSESSMENT — ENCOUNTER SYMPTOMS
RESPIRATORY NEGATIVE: 1
GASTROINTESTINAL NEGATIVE: 1

## 2019-12-23 DIAGNOSIS — R11.0 NAUSEA: ICD-10-CM

## 2019-12-23 RX ORDER — ONDANSETRON 4 MG/1
TABLET, ORALLY DISINTEGRATING ORAL
Qty: 20 TABLET | Refills: 0 | Status: SHIPPED | OUTPATIENT
Start: 2019-12-23 | End: 2020-03-19 | Stop reason: SDUPTHER

## 2020-01-13 RX ORDER — IBUPROFEN 600 MG/1
TABLET ORAL
Qty: 360 TABLET | Refills: 0 | Status: SHIPPED | OUTPATIENT
Start: 2020-01-13 | End: 2020-03-19 | Stop reason: SDUPTHER

## 2020-01-13 RX ORDER — VALACYCLOVIR HYDROCHLORIDE 1 G/1
TABLET, FILM COATED ORAL
Qty: 20 TABLET | Refills: 2 | Status: SHIPPED | OUTPATIENT
Start: 2020-01-13 | End: 2020-09-14 | Stop reason: SDUPTHER

## 2020-01-13 NOTE — TELEPHONE ENCOUNTER
[facility] called requesting a refill on the following medications:  Requested Prescriptions     Pending Prescriptions Disp Refills    ibuprofen (ADVIL;MOTRIN) 600 MG tablet 360 tablet 0     Sig: TAKE 1 TABLET EVERY 6 HOURS AS NEEDED FOR PAIN    valACYclovir (VALTREX) 1 g tablet 20 tablet 2     Sig: Take 2 tabs BID for 1 day at onset of cold sore     Pharmacy verified:  jayda ravi    Date of last visit: 12-03-19  Date of next visit (if applicable): Visit date not found

## 2020-01-27 ENCOUNTER — OFFICE VISIT (OUTPATIENT)
Dept: FAMILY MEDICINE CLINIC | Age: 65
End: 2020-01-27
Payer: MEDICARE

## 2020-01-27 VITALS
DIASTOLIC BLOOD PRESSURE: 80 MMHG | SYSTOLIC BLOOD PRESSURE: 136 MMHG | WEIGHT: 278.2 LBS | BODY MASS INDEX: 46.29 KG/M2 | HEART RATE: 76 BPM | RESPIRATION RATE: 16 BRPM | OXYGEN SATURATION: 97 %

## 2020-01-27 PROBLEM — N39.3 STRESS INCONTINENCE (FEMALE) (MALE): Status: ACTIVE | Noted: 2018-08-29

## 2020-01-27 PROCEDURE — G8482 FLU IMMUNIZE ORDER/ADMIN: HCPCS | Performed by: FAMILY MEDICINE

## 2020-01-27 PROCEDURE — 3017F COLORECTAL CA SCREEN DOC REV: CPT | Performed by: FAMILY MEDICINE

## 2020-01-27 PROCEDURE — 1123F ACP DISCUSS/DSCN MKR DOCD: CPT | Performed by: FAMILY MEDICINE

## 2020-01-27 PROCEDURE — G8417 CALC BMI ABV UP PARAM F/U: HCPCS | Performed by: FAMILY MEDICINE

## 2020-01-27 PROCEDURE — 1036F TOBACCO NON-USER: CPT | Performed by: FAMILY MEDICINE

## 2020-01-27 PROCEDURE — 99213 OFFICE O/P EST LOW 20 MIN: CPT | Performed by: FAMILY MEDICINE

## 2020-01-27 PROCEDURE — 4040F PNEUMOC VAC/ADMIN/RCVD: CPT | Performed by: FAMILY MEDICINE

## 2020-01-27 PROCEDURE — G8427 DOCREV CUR MEDS BY ELIG CLIN: HCPCS | Performed by: FAMILY MEDICINE

## 2020-01-27 PROCEDURE — 20610 DRAIN/INJ JOINT/BURSA W/O US: CPT | Performed by: FAMILY MEDICINE

## 2020-01-27 PROCEDURE — G8399 PT W/DXA RESULTS DOCUMENT: HCPCS | Performed by: FAMILY MEDICINE

## 2020-01-27 PROCEDURE — 1090F PRES/ABSN URINE INCON ASSESS: CPT | Performed by: FAMILY MEDICINE

## 2020-01-27 RX ORDER — METHYLPREDNISOLONE ACETATE 80 MG/ML
80 INJECTION, SUSPENSION INTRA-ARTICULAR; INTRALESIONAL; INTRAMUSCULAR; SOFT TISSUE ONCE
Status: COMPLETED | OUTPATIENT
Start: 2020-01-27 | End: 2020-01-27

## 2020-01-27 RX ADMIN — METHYLPREDNISOLONE ACETATE 80 MG: 80 INJECTION, SUSPENSION INTRA-ARTICULAR; INTRALESIONAL; INTRAMUSCULAR; SOFT TISSUE at 15:17

## 2020-01-27 SDOH — ECONOMIC STABILITY: INCOME INSECURITY: HOW HARD IS IT FOR YOU TO PAY FOR THE VERY BASICS LIKE FOOD, HOUSING, MEDICAL CARE, AND HEATING?: NOT HARD AT ALL

## 2020-01-27 SDOH — ECONOMIC STABILITY: TRANSPORTATION INSECURITY
IN THE PAST 12 MONTHS, HAS THE LACK OF TRANSPORTATION KEPT YOU FROM MEDICAL APPOINTMENTS OR FROM GETTING MEDICATIONS?: NO

## 2020-01-27 SDOH — ECONOMIC STABILITY: FOOD INSECURITY: WITHIN THE PAST 12 MONTHS, YOU WORRIED THAT YOUR FOOD WOULD RUN OUT BEFORE YOU GOT MONEY TO BUY MORE.: NEVER TRUE

## 2020-01-27 SDOH — ECONOMIC STABILITY: FOOD INSECURITY: WITHIN THE PAST 12 MONTHS, THE FOOD YOU BOUGHT JUST DIDN'T LAST AND YOU DIDN'T HAVE MONEY TO GET MORE.: NEVER TRUE

## 2020-01-27 SDOH — ECONOMIC STABILITY: TRANSPORTATION INSECURITY
IN THE PAST 12 MONTHS, HAS LACK OF TRANSPORTATION KEPT YOU FROM MEETINGS, WORK, OR FROM GETTING THINGS NEEDED FOR DAILY LIVING?: NO

## 2020-01-27 ASSESSMENT — PATIENT HEALTH QUESTIONNAIRE - PHQ9
2. FEELING DOWN, DEPRESSED OR HOPELESS: 0
SUM OF ALL RESPONSES TO PHQ QUESTIONS 1-9: 0
SUM OF ALL RESPONSES TO PHQ QUESTIONS 1-9: 0
SUM OF ALL RESPONSES TO PHQ9 QUESTIONS 1 & 2: 0
1. LITTLE INTEREST OR PLEASURE IN DOING THINGS: 0

## 2020-01-27 ASSESSMENT — ENCOUNTER SYMPTOMS
RESPIRATORY NEGATIVE: 1
GASTROINTESTINAL NEGATIVE: 1

## 2020-01-27 NOTE — PROGRESS NOTES
Visit Information    Have you changed or started any medications since your last visit including any over-the-counter medicines, vitamins, or herbal medicines? no   Are you having any side effects from any of your medications? -  no  Have you stopped taking any of your medications? Is so, why? -  no    Have you seen any other physician or provider since your last visit? Yes - Records Requested  Have you had any other diagnostic tests since your last visit? No  Have you been seen in the emergency room and/or had an admission to a hospital since we last saw you? No    Have you activated your [employer] account? If not, what are your barriers? Yes     Patient Care Team:  Peggy Bradford DO as PCP - General (Family Medicine)  [facility]  Jose Calabrese DPM as Consulting Physician (Podiatry)  KELIN Reyes CNP as Nurse Practitioner (Nurse Practitioner)    Medical History Review  Past Medical, Family, and Social History reviewed and does contribute to the patient presenting condition    Health Maintenance   Topic Date Due    DTaP/Tdap/Td vaccine (1 - Tdap) 01/17/1965    Shingles Vaccine (1 of 2) 01/17/2004    Annual Wellness Visit (AWV)  05/29/2019    A1C test (Diabetic or Prediabetic)  06/03/2020    Lipid screen  06/03/2020    TSH testing  06/03/2020    Potassium monitoring  08/26/2020    Creatinine monitoring  08/26/2020    Pneumococcal 65+ years Vaccine (2 of 2 - PPSV23) 09/17/2020    Breast cancer screen  06/06/2021    Colon cancer screen fecal DNA test (Cologuard)  08/01/2022    DEXA (modify frequency per FRAX score)  Completed    Flu vaccine  Completed    Hepatitis C screen  Completed       Administrations This Visit     methylPREDNISolone acetate (DEPO-MEDROL) injection 80 mg     Admin Date  01/27/2020 Action  Given by Other Dose  80 mg Route  Intra-articular Administered By  Melisa Kirby CMA (AAMA)              Given by Dr Su Pepe.  Depo Medrol 80mg mixed with 2mL Sensorcaine 0.5% NDC 8033-0191-90 Lot -DK Exp 4/1/21

## 2020-01-27 NOTE — PROGRESS NOTES
disintegrating tablet DISSOLVE 1 TABLET ON THE TONGUE EVERY 8 HOURS AS NEEDED FOR NAUSEA 12/23/19  Yes Veto Cuevaslar, DO   atorvastatin (LIPITOR) 40 MG tablet Take 1 tablet by mouth daily 12/3/19  Yes Veto Greene, DO   Magnesium 400 MG TABS  6/4/19  Yes Historical Provider, MD   lisinopril (PRINIVIL;ZESTRIL) 5 MG tablet TAKE 1 TABLET BY MOUTH EVERY DAY 7/25/19  Yes Veto Greene, DO   omeprazole (PRILOSEC) 40 MG delayed release capsule TAKE 1 CAPSULE BY MOUTH DAILY WITH BREAKFAST 6/5/19  Yes Veto Cuevaslar, DO   metoprolol tartrate (LOPRESSOR) 25 MG tablet TAKE ONE-HALF TABLET BY MOUTH TWICE DAILY 6/5/19  Yes Veto Greene, DO   Cholecalciferol (VITAMIN D PO) Take by mouth daily    Yes Historical Provider, MD   aspirin 81 MG tablet Take 81 mg by mouth daily   Yes Historical Provider, MD         Review of Systems   Constitutional: Negative. HENT: Negative. Respiratory: Negative. Cardiovascular: Negative. Gastrointestinal: Negative. Musculoskeletal: Positive for arthralgias (left knee pain). All other systems reviewed and are negative. Objective:   Physical Exam  Vitals signs and nursing note reviewed. Constitutional:       Appearance: She is well-developed. HENT:      Head: Normocephalic and atraumatic. Right Ear: Tympanic membrane normal.      Left Ear: Tympanic membrane normal.   Cardiovascular:      Rate and Rhythm: Normal rate and regular rhythm. Heart sounds: Normal heart sounds. No murmur. Pulmonary:      Effort: Pulmonary effort is normal.      Breath sounds: Normal breath sounds. Abdominal:      General: Bowel sounds are normal.      Palpations: Abdomen is soft. Musculoskeletal:      Left knee: She exhibits decreased range of motion. Tenderness found. Medial joint line and lateral joint line tenderness noted. Skin:     General: Skin is warm and dry.    Neurological:      Mental Status: She is alert and oriented to person, place, and time.   Psychiatric:         Behavior: Behavior normal.         Assessment:       Diagnosis Orders   1. Primary osteoarthritis of left knee  methylPREDNISolone acetate (DEPO-MEDROL) injection 80 mg    20610 - PA DRAIN/INJECT LARGE JOINT/BURSA           Plan:      -  Risks/benefits discussed. After patient consent obtained, the Left infrapatellar region of the knee was cleansed and anesthetized with ethyl chloride. The knee was then injected with DepoMedrol 80mg mixed with 2cc of Marcaine.   Ice then applied for 10 minutes  -  RTO prn          Estefani Garnett DO

## 2020-03-19 RX ORDER — IBUPROFEN 600 MG/1
TABLET ORAL
Qty: 360 TABLET | Refills: 0 | Status: SHIPPED | OUTPATIENT
Start: 2020-03-19 | End: 2020-04-15 | Stop reason: SDUPTHER

## 2020-03-19 RX ORDER — ONDANSETRON 4 MG/1
TABLET, ORALLY DISINTEGRATING ORAL
Qty: 20 TABLET | Refills: 0 | Status: SHIPPED | OUTPATIENT
Start: 2020-03-19 | End: 2020-05-29 | Stop reason: SDUPTHER

## 2020-03-23 ENCOUNTER — OFFICE VISIT (OUTPATIENT)
Dept: FAMILY MEDICINE CLINIC | Age: 65
End: 2020-03-23
Payer: MEDICARE

## 2020-03-23 VITALS
HEART RATE: 88 BPM | SYSTOLIC BLOOD PRESSURE: 138 MMHG | RESPIRATION RATE: 20 BRPM | OXYGEN SATURATION: 97 % | DIASTOLIC BLOOD PRESSURE: 86 MMHG | TEMPERATURE: 97.3 F | WEIGHT: 281.1 LBS | BODY MASS INDEX: 46.78 KG/M2

## 2020-03-23 PROCEDURE — 99213 OFFICE O/P EST LOW 20 MIN: CPT | Performed by: FAMILY MEDICINE

## 2020-03-23 PROCEDURE — G8399 PT W/DXA RESULTS DOCUMENT: HCPCS | Performed by: FAMILY MEDICINE

## 2020-03-23 PROCEDURE — 1090F PRES/ABSN URINE INCON ASSESS: CPT | Performed by: FAMILY MEDICINE

## 2020-03-23 PROCEDURE — G8417 CALC BMI ABV UP PARAM F/U: HCPCS | Performed by: FAMILY MEDICINE

## 2020-03-23 PROCEDURE — 1036F TOBACCO NON-USER: CPT | Performed by: FAMILY MEDICINE

## 2020-03-23 PROCEDURE — G8482 FLU IMMUNIZE ORDER/ADMIN: HCPCS | Performed by: FAMILY MEDICINE

## 2020-03-23 PROCEDURE — 3017F COLORECTAL CA SCREEN DOC REV: CPT | Performed by: FAMILY MEDICINE

## 2020-03-23 PROCEDURE — 4040F PNEUMOC VAC/ADMIN/RCVD: CPT | Performed by: FAMILY MEDICINE

## 2020-03-23 PROCEDURE — 1123F ACP DISCUSS/DSCN MKR DOCD: CPT | Performed by: FAMILY MEDICINE

## 2020-03-23 PROCEDURE — G8427 DOCREV CUR MEDS BY ELIG CLIN: HCPCS | Performed by: FAMILY MEDICINE

## 2020-03-23 RX ORDER — PREDNISONE 20 MG/1
20 TABLET ORAL 2 TIMES DAILY
Qty: 10 TABLET | Refills: 0 | Status: SHIPPED | OUTPATIENT
Start: 2020-03-23 | End: 2020-03-28

## 2020-03-23 ASSESSMENT — ENCOUNTER SYMPTOMS
SHORTNESS OF BREATH: 0
RHINORRHEA: 1
SINUS PAIN: 0
COUGH: 1
SINUS PRESSURE: 1
SORE THROAT: 1
GASTROINTESTINAL NEGATIVE: 1
WHEEZING: 0
CHEST TIGHTNESS: 0

## 2020-03-23 NOTE — PROGRESS NOTES
cold sore 1/13/20  Yes Richie Samuel DO   atorvastatin (LIPITOR) 40 MG tablet Take 1 tablet by mouth daily 12/3/19  Yes Richie Samuel DO   Magnesium 400 MG TABS  6/4/19  Yes Historical Provider, MD   lisinopril (PRINIVIL;ZESTRIL) 5 MG tablet TAKE 1 TABLET BY MOUTH EVERY DAY 7/25/19  Yes Richie Samuel DO   omeprazole (PRILOSEC) 40 MG delayed release capsule TAKE 1 CAPSULE BY MOUTH DAILY WITH BREAKFAST 6/5/19  Yes Richie Samuel DO   metoprolol tartrate (LOPRESSOR) 25 MG tablet TAKE ONE-HALF TABLET BY MOUTH TWICE DAILY 6/5/19  Yes Richie Samuel DO   Cholecalciferol (VITAMIN D PO) Take by mouth daily    Yes Historical Provider, MD   aspirin 81 MG tablet Take 81 mg by mouth daily   Yes Historical Provider, MD         Review of Systems   Constitutional: Negative. Negative for fever. HENT: Positive for congestion, postnasal drip, rhinorrhea, sinus pressure and sore throat. Negative for sinus pain. Respiratory: Positive for cough (morning only). Negative for chest tightness, shortness of breath and wheezing. Cardiovascular: Negative. Gastrointestinal: Negative. Musculoskeletal: Negative. All other systems reviewed and are negative. Objective:   Physical Exam  Vitals signs and nursing note reviewed. Constitutional:       Appearance: She is well-developed. HENT:      Head: Normocephalic and atraumatic. Right Ear: Tympanic membrane normal.      Left Ear: Tympanic membrane normal.      Mouth/Throat:      Pharynx: Posterior oropharyngeal erythema present. No pharyngeal swelling or oropharyngeal exudate. Cardiovascular:      Rate and Rhythm: Normal rate and regular rhythm. Heart sounds: Normal heart sounds. No murmur. Pulmonary:      Effort: Pulmonary effort is normal.      Breath sounds: Normal breath sounds. Abdominal:      General: Bowel sounds are normal.      Palpations: Abdomen is soft. Skin:     General: Skin is warm and dry.    Neurological:

## 2020-03-23 NOTE — PATIENT INSTRUCTIONS
You may receive a survey about your visit with us today. The feedback from our patients helps us identify what is working well and where the service to all patients can be enhanced. Thank you!     OTC Coricidin HBP

## 2020-04-15 RX ORDER — IBUPROFEN 600 MG/1
TABLET ORAL
Qty: 360 TABLET | Refills: 0 | Status: SHIPPED | OUTPATIENT
Start: 2020-04-15 | End: 2020-06-29 | Stop reason: ALTCHOICE

## 2020-05-01 ENCOUNTER — TELEPHONE (OUTPATIENT)
Dept: FAMILY MEDICINE CLINIC | Age: 65
End: 2020-05-01

## 2020-05-29 RX ORDER — ONDANSETRON 4 MG/1
TABLET, ORALLY DISINTEGRATING ORAL
Qty: 20 TABLET | Refills: 0 | Status: SHIPPED | OUTPATIENT
Start: 2020-05-29 | End: 2020-09-14 | Stop reason: SDUPTHER

## 2020-05-29 NOTE — TELEPHONE ENCOUNTER
Sharlene Elliott called requesting a refill on the following medications:  Requested Prescriptions     Pending Prescriptions Disp Refills    ondansetron (ZOFRAN-ODT) 4 MG disintegrating tablet 20 tablet 0     Sig: DISSOLVE 1 TABLET ON THE TONGUE EVERY 8 HOURS AS NEEDED FOR NAUSEA     Pharmacy verified:  .pv    [facility]    Date of last visit: 03/23/20  Date of next visit (if applicable): 8/6/7042

## 2020-06-09 ENCOUNTER — OFFICE VISIT (OUTPATIENT)
Dept: FAMILY MEDICINE CLINIC | Age: 65
End: 2020-06-09
Payer: MEDICARE

## 2020-06-09 VITALS
RESPIRATION RATE: 18 BRPM | DIASTOLIC BLOOD PRESSURE: 80 MMHG | SYSTOLIC BLOOD PRESSURE: 120 MMHG | WEIGHT: 292.3 LBS | OXYGEN SATURATION: 98 % | BODY MASS INDEX: 48.64 KG/M2 | HEART RATE: 73 BPM | TEMPERATURE: 97.9 F

## 2020-06-09 PROCEDURE — G8427 DOCREV CUR MEDS BY ELIG CLIN: HCPCS | Performed by: FAMILY MEDICINE

## 2020-06-09 PROCEDURE — 1123F ACP DISCUSS/DSCN MKR DOCD: CPT | Performed by: FAMILY MEDICINE

## 2020-06-09 PROCEDURE — 4040F PNEUMOC VAC/ADMIN/RCVD: CPT | Performed by: FAMILY MEDICINE

## 2020-06-09 PROCEDURE — 1090F PRES/ABSN URINE INCON ASSESS: CPT | Performed by: FAMILY MEDICINE

## 2020-06-09 PROCEDURE — G8399 PT W/DXA RESULTS DOCUMENT: HCPCS | Performed by: FAMILY MEDICINE

## 2020-06-09 PROCEDURE — G8417 CALC BMI ABV UP PARAM F/U: HCPCS | Performed by: FAMILY MEDICINE

## 2020-06-09 PROCEDURE — 3017F COLORECTAL CA SCREEN DOC REV: CPT | Performed by: FAMILY MEDICINE

## 2020-06-09 PROCEDURE — 99214 OFFICE O/P EST MOD 30 MIN: CPT | Performed by: FAMILY MEDICINE

## 2020-06-09 PROCEDURE — 1036F TOBACCO NON-USER: CPT | Performed by: FAMILY MEDICINE

## 2020-06-09 RX ORDER — ZINC GLUCONATE 50 MG
50 TABLET ORAL DAILY
COMMUNITY
End: 2021-04-13

## 2020-06-09 ASSESSMENT — ENCOUNTER SYMPTOMS
SHORTNESS OF BREATH: 1
GASTROINTESTINAL NEGATIVE: 1

## 2020-06-29 ENCOUNTER — OFFICE VISIT (OUTPATIENT)
Dept: FAMILY MEDICINE CLINIC | Age: 65
End: 2020-06-29
Payer: MEDICARE

## 2020-06-29 VITALS
HEART RATE: 68 BPM | BODY MASS INDEX: 47.38 KG/M2 | DIASTOLIC BLOOD PRESSURE: 80 MMHG | WEIGHT: 284.7 LBS | RESPIRATION RATE: 16 BRPM | SYSTOLIC BLOOD PRESSURE: 132 MMHG | TEMPERATURE: 97.3 F

## 2020-06-29 PROCEDURE — 3017F COLORECTAL CA SCREEN DOC REV: CPT | Performed by: NURSE PRACTITIONER

## 2020-06-29 PROCEDURE — G8399 PT W/DXA RESULTS DOCUMENT: HCPCS | Performed by: NURSE PRACTITIONER

## 2020-06-29 PROCEDURE — 1123F ACP DISCUSS/DSCN MKR DOCD: CPT | Performed by: NURSE PRACTITIONER

## 2020-06-29 PROCEDURE — G8417 CALC BMI ABV UP PARAM F/U: HCPCS | Performed by: NURSE PRACTITIONER

## 2020-06-29 PROCEDURE — 1090F PRES/ABSN URINE INCON ASSESS: CPT | Performed by: NURSE PRACTITIONER

## 2020-06-29 PROCEDURE — 4040F PNEUMOC VAC/ADMIN/RCVD: CPT | Performed by: NURSE PRACTITIONER

## 2020-06-29 PROCEDURE — 1036F TOBACCO NON-USER: CPT | Performed by: NURSE PRACTITIONER

## 2020-06-29 PROCEDURE — 99213 OFFICE O/P EST LOW 20 MIN: CPT | Performed by: NURSE PRACTITIONER

## 2020-06-29 PROCEDURE — G8427 DOCREV CUR MEDS BY ELIG CLIN: HCPCS | Performed by: NURSE PRACTITIONER

## 2020-06-29 PROCEDURE — 96372 THER/PROPH/DIAG INJ SC/IM: CPT | Performed by: NURSE PRACTITIONER

## 2020-06-29 RX ORDER — METHYLPREDNISOLONE ACETATE 80 MG/ML
80 INJECTION, SUSPENSION INTRA-ARTICULAR; INTRALESIONAL; INTRAMUSCULAR; SOFT TISSUE ONCE
Status: COMPLETED | OUTPATIENT
Start: 2020-06-29 | End: 2020-06-29

## 2020-06-29 RX ORDER — METHYLPREDNISOLONE ACETATE 40 MG/ML
40 INJECTION, SUSPENSION INTRA-ARTICULAR; INTRALESIONAL; INTRAMUSCULAR; SOFT TISSUE ONCE
Status: COMPLETED | OUTPATIENT
Start: 2020-06-29 | End: 2020-06-29

## 2020-06-29 RX ORDER — MECLIZINE HYDROCHLORIDE 25 MG/1
25 TABLET ORAL 3 TIMES DAILY PRN
Qty: 90 TABLET | Refills: 1 | Status: SHIPPED | OUTPATIENT
Start: 2020-06-29 | End: 2020-12-16

## 2020-06-29 RX ORDER — LISINOPRIL 5 MG/1
TABLET ORAL
Qty: 90 TABLET | Refills: 3 | Status: SHIPPED | OUTPATIENT
Start: 2020-06-29 | End: 2021-06-02 | Stop reason: SDUPTHER

## 2020-06-29 RX ORDER — KETOROLAC TROMETHAMINE 10 MG/1
10 TABLET, FILM COATED ORAL EVERY 6 HOURS PRN
Qty: 20 TABLET | Refills: 0 | Status: SHIPPED | OUTPATIENT
Start: 2020-06-29 | End: 2020-07-01

## 2020-06-29 RX ADMIN — METHYLPREDNISOLONE ACETATE 80 MG: 80 INJECTION, SUSPENSION INTRA-ARTICULAR; INTRALESIONAL; INTRAMUSCULAR; SOFT TISSUE at 11:54

## 2020-06-29 RX ADMIN — METHYLPREDNISOLONE ACETATE 40 MG: 40 INJECTION, SUSPENSION INTRA-ARTICULAR; INTRALESIONAL; INTRAMUSCULAR; SOFT TISSUE at 11:54

## 2020-06-29 ASSESSMENT — ENCOUNTER SYMPTOMS
NAUSEA: 0
SHORTNESS OF BREATH: 0
COUGH: 0
ABDOMINAL PAIN: 0

## 2020-06-29 NOTE — PROGRESS NOTES
sounds: No murmur. Pulmonary:      Effort: Pulmonary effort is normal.      Breath sounds: Normal breath sounds. No wheezing. Abdominal:      General: Bowel sounds are normal. There is no distension. Palpations: Abdomen is soft. Tenderness: There is no abdominal tenderness. Musculoskeletal: Normal range of motion. Right shoulder: She exhibits tenderness, bony tenderness and pain. She exhibits normal range of motion and normal strength. Arms:    Skin:     General: Skin is warm and dry. Findings: No rash. Assessment:       Diagnosis Orders   1. Acute bursitis of right shoulder  methylPREDNISolone acetate (DEPO-MEDROL) injection 80 mg    methylPREDNISolone acetate (DEPO-MEDROL) injection 40 mg    ketorolac (TORADOL) 10 MG tablet   2. Trapezius strain, right, initial encounter     3. Coronary artery disease involving native coronary artery of native heart without angina pectoris  lisinopril (PRINIVIL;ZESTRIL) 5 MG tablet   4.  Dizziness  meclizine (ANTIVERT) 25 MG tablet           Plan:      DEPO 120 IM  Toradol PRN  Continue ICE  Refills as above  RTO if symptoms worsen or stay the same          Clarence Alan APRN - CNP

## 2020-07-01 ENCOUNTER — TELEPHONE (OUTPATIENT)
Dept: FAMILY MEDICINE CLINIC | Age: 65
End: 2020-07-01

## 2020-07-01 RX ORDER — TIZANIDINE 4 MG/1
4 TABLET ORAL EVERY 8 HOURS PRN
Qty: 30 TABLET | Refills: 0 | Status: SHIPPED | OUTPATIENT
Start: 2020-07-01 | End: 2020-07-08

## 2020-07-01 NOTE — TELEPHONE ENCOUNTER
Pt is calling and said that the shot she received helped until last night and now the pain is back fully. She said the medication she was given is not working and she would like a muscle relaxer or a stronger pain medication.   Please advise pt at [phone]

## 2020-07-08 ENCOUNTER — OFFICE VISIT (OUTPATIENT)
Dept: FAMILY MEDICINE CLINIC | Age: 65
End: 2020-07-08
Payer: MEDICARE

## 2020-07-08 VITALS
WEIGHT: 288.4 LBS | BODY MASS INDEX: 47.99 KG/M2 | HEART RATE: 84 BPM | DIASTOLIC BLOOD PRESSURE: 80 MMHG | RESPIRATION RATE: 16 BRPM | SYSTOLIC BLOOD PRESSURE: 122 MMHG | TEMPERATURE: 97.7 F

## 2020-07-08 PROCEDURE — 1036F TOBACCO NON-USER: CPT | Performed by: FAMILY MEDICINE

## 2020-07-08 PROCEDURE — 1123F ACP DISCUSS/DSCN MKR DOCD: CPT | Performed by: FAMILY MEDICINE

## 2020-07-08 PROCEDURE — G8399 PT W/DXA RESULTS DOCUMENT: HCPCS | Performed by: FAMILY MEDICINE

## 2020-07-08 PROCEDURE — 1090F PRES/ABSN URINE INCON ASSESS: CPT | Performed by: FAMILY MEDICINE

## 2020-07-08 PROCEDURE — G8427 DOCREV CUR MEDS BY ELIG CLIN: HCPCS | Performed by: FAMILY MEDICINE

## 2020-07-08 PROCEDURE — 99213 OFFICE O/P EST LOW 20 MIN: CPT | Performed by: FAMILY MEDICINE

## 2020-07-08 PROCEDURE — 3017F COLORECTAL CA SCREEN DOC REV: CPT | Performed by: FAMILY MEDICINE

## 2020-07-08 PROCEDURE — 4040F PNEUMOC VAC/ADMIN/RCVD: CPT | Performed by: FAMILY MEDICINE

## 2020-07-08 PROCEDURE — G8417 CALC BMI ABV UP PARAM F/U: HCPCS | Performed by: FAMILY MEDICINE

## 2020-07-08 RX ORDER — METHYLPREDNISOLONE 4 MG/1
TABLET ORAL
Qty: 1 KIT | Refills: 0 | Status: SHIPPED | OUTPATIENT
Start: 2020-07-08 | End: 2020-07-14

## 2020-07-08 RX ORDER — AMITRIPTYLINE HYDROCHLORIDE 25 MG/1
25 TABLET, FILM COATED ORAL NIGHTLY
Qty: 30 TABLET | Refills: 0 | Status: SHIPPED | OUTPATIENT
Start: 2020-07-08 | End: 2020-12-29 | Stop reason: SDUPTHER

## 2020-07-08 ASSESSMENT — ENCOUNTER SYMPTOMS
GASTROINTESTINAL NEGATIVE: 1
RESPIRATORY NEGATIVE: 1

## 2020-07-08 NOTE — PROGRESS NOTES
Subjective:      Patient ID: César Aguirre is a 76 y.o. female. HPI:    Chief Complaint   Patient presents with    Neck Pain     still ongoing     Pt here for follow up on her neck pain. Was seen by Efra Arcos on 6/29 and gave Depo 120 mg IM and po Toradol with little relief. Has been seeing her Chiropractor with no relief. Efra Arcos then called in a muscle relaxer which did nothing. Pain starts in the right side of the neck and radiates to right shoulder. Pain increased with laying down. This has been going on for the last 4 weeks. Was lifting boxes. No recent falls. Patient Active Problem List   Diagnosis    CAD (coronary artery disease) 7/2014- mid lad 50 % patent LM< LCX and RCA    Hyperlipidemia LDL goal <70    Hypothyroid    History of total thyroidectomy    GERD (gastroesophageal reflux disease)    PUD (peptic ulcer disease)    IBS (irritable bowel syndrome)    IFG (impaired fasting glucose)    Vitamin D deficiency    SOB (shortness of breath) on exertion    Chest pain    Heart palpitations- intermittent    Abnormal nuclear stress test- anteroior and anteroseptal ischemia    Chest pain, atypical    Abdominal pain    Stress incontinence (female) (male)     Past Surgical History:   Procedure Laterality Date    CARDIAC CATHETERIZATION  2008    CARPAL TUNNEL RELEASE  2008    CHOLECYSTECTOMY      COLONOSCOPY      HAMMER TOE SURGERY Left     HEEL SPUR SURGERY      HYSTERECTOMY      KNEE SURGERY  2009    TOTAL THYROIDECTOMY      UPPER GASTROINTESTINAL ENDOSCOPY  2009     Prior to Admission medications    Medication Sig Start Date End Date Taking?  Authorizing Provider   lisinopril (PRINIVIL;ZESTRIL) 5 MG tablet TAKE 1 TABLET BY MOUTH EVERY DAY 6/29/20  Yes Urban Bamberger, APRN - CNP   meclizine (ANTIVERT) 25 MG tablet Take 1 tablet by mouth 3 times daily as needed for Dizziness 6/29/20  Yes Urban Bamberger, APRN - CNP   zinc gluconate 50 MG tablet Take 50 mg by mouth daily Yes Historical Provider, MD   Cyanocobalamin (B-12 PO) Take by mouth daily   Yes Historical Provider, MD   Ascorbic Acid (LEIGHANN-C PO) Take by mouth daily   Yes Historical Provider, MD   ondansetron (ZOFRAN-ODT) 4 MG disintegrating tablet DISSOLVE 1 TABLET ON THE TONGUE EVERY 8 HOURS AS NEEDED FOR NAUSEA 5/29/20  Yes Maximilian Peters DO   POTASSIUM PO Take by mouth   Yes Historical Provider, MD   valACYclovir (VALTREX) 1 g tablet Take 2 tabs BID for 1 day at onset of cold sore 1/13/20  Yes Maximilian Peters DO   atorvastatin (LIPITOR) 40 MG tablet Take 1 tablet by mouth daily 12/3/19  Yes Maximilian Peters DO   Magnesium 400 MG TABS  6/4/19  Yes Historical Provider, MD   omeprazole (PRILOSEC) 40 MG delayed release capsule TAKE 1 CAPSULE BY MOUTH DAILY WITH BREAKFAST 6/5/19  Yes Maximilian Peters DO   metoprolol tartrate (LOPRESSOR) 25 MG tablet TAKE ONE-HALF TABLET BY MOUTH TWICE DAILY 6/5/19  Yes Maximilian Peters DO   Cholecalciferol (VITAMIN D PO) Take by mouth daily    Yes Historical Provider, MD   aspirin 81 MG tablet Take 81 mg by mouth daily   Yes Historical Provider, MD   tiZANidine (ZANAFLEX) 4 MG tablet Take 1 tablet by mouth every 8 hours as needed (neck pain)  Patient not taking: Reported on 7/8/2020 7/1/20   KELIN Jimenez - CNP         Review of Systems   Constitutional: Negative. HENT: Negative. Respiratory: Negative. Cardiovascular: Negative. Gastrointestinal: Negative. Musculoskeletal: Positive for arthralgias (right shoulder pain), neck pain and neck stiffness. Neurological: Negative for weakness and numbness. All other systems reviewed and are negative. Objective:   Physical Exam  Vitals signs and nursing note reviewed. Constitutional:       Appearance: She is well-developed. HENT:      Head: Normocephalic and atraumatic.       Right Ear: Tympanic membrane normal.      Left Ear: Tympanic membrane normal.   Cardiovascular:      Rate and Rhythm: Normal rate and regular rhythm. Heart sounds: Normal heart sounds. No murmur. Pulmonary:      Effort: Pulmonary effort is normal.      Breath sounds: Normal breath sounds. Abdominal:      General: Bowel sounds are normal.      Palpations: Abdomen is soft. Musculoskeletal:      Left shoulder: She exhibits tenderness. She exhibits normal range of motion and normal strength. Cervical back: She exhibits decreased range of motion, tenderness and pain. She exhibits no spasm. Back:    Skin:     General: Skin is warm and dry. Neurological:      Mental Status: She is alert and oriented to person, place, and time. Psychiatric:         Behavior: Behavior normal.         Assessment:       Diagnosis Orders   1.  Cervical radiculitis  methylPREDNISolone (MEDROL, ALEJANDRA,) 4 MG tablet    amitriptyline (ELAVIL) 25 MG tablet    XR CERVICAL SPINE (2-3 VIEWS)    [facility] -  [facility]           Plan:      -  Feel that her symptoms are cervical in nature and not in her shoulder  -  Orders above  -  RTO prn        Amber Starch, DO

## 2020-07-08 NOTE — PROGRESS NOTES
Visit Information    Have you changed or started any medications since your last visit including any over-the-counter medicines, vitamins, or herbal medicines? no   Are you having any side effects from any of your medications? -  no  Have you stopped taking any of your medications? Is so, why? -  yes - Zanaflex and Toradol due to did not help with pain    Have you seen any other physician or provider since your last visit? No  Have you had any other diagnostic tests since your last visit? No  Have you been seen in the emergency room and/or had an admission to a hospital since we last saw you? No  Have you had your routine dental cleaning in the past 6 months? no    Have you activated your [employer] account? If not, what are your barriers?  Yes     Patient Care Team:  Maximilian Peters DO as PCP - General (Family Medicine)  Maximilian Peters DO as PCP - [facility] Missy Malloy DPM as Consulting Physician (Podiatry)  KELIN Good - CNP as Nurse Practitioner (Nurse Practitioner)    Medical History Review  Past Medical, Family, and Social History reviewed and does contribute to the patient presenting condition    Health Maintenance   Topic Date Due    DTaP/Tdap/Td vaccine (1 - Tdap) 01/17/1973    Shingles Vaccine (1 of 2) 01/17/2004    Annual Wellness Visit (AWV)  05/29/2019    A1C test (Diabetic or Prediabetic)  06/03/2020    Lipid screen  06/03/2020    TSH testing  06/03/2020    Potassium monitoring  08/26/2020    Creatinine monitoring  08/26/2020    Flu vaccine (1) 09/01/2020    Pneumococcal 65+ years Vaccine (2 of 2 - PPSV23) 09/17/2020    Breast cancer screen  06/06/2021    Colon cancer screen fecal DNA test (Cologuard)  08/01/2022    DEXA (modify frequency per FRAX score)  Completed    Hepatitis C screen  Completed    Hepatitis A vaccine  Aged Out    Hepatitis B vaccine  Aged Out    Hib vaccine  Aged Out    Meningococcal (ACWY) vaccine  Aged Out

## 2020-07-21 ENCOUNTER — HOSPITAL ENCOUNTER (OUTPATIENT)
Dept: PHYSICAL THERAPY | Age: 65
Setting detail: THERAPIES SERIES
Discharge: HOME | End: 2020-07-21
Payer: MEDICARE

## 2020-07-27 ENCOUNTER — HOSPITAL ENCOUNTER (EMERGENCY)
Age: 65
Discharge: HOME | End: 2020-07-27
Payer: MEDICARE

## 2020-07-27 VITALS
SYSTOLIC BLOOD PRESSURE: 162 MMHG | DIASTOLIC BLOOD PRESSURE: 81 MMHG | WEIGHT: 280 LBS | RESPIRATION RATE: 20 BRPM | TEMPERATURE: 97.1 F | HEART RATE: 78 BPM | OXYGEN SATURATION: 98 % | BODY MASS INDEX: 46.59 KG/M2

## 2020-07-27 PROCEDURE — 99213 OFFICE O/P EST LOW 20 MIN: CPT | Performed by: NURSE PRACTITIONER

## 2020-07-27 PROCEDURE — 96372 THER/PROPH/DIAG INJ SC/IM: CPT

## 2020-07-27 PROCEDURE — 6360000002 HC RX W HCPCS: Performed by: NURSE PRACTITIONER

## 2020-07-27 PROCEDURE — 99213 OFFICE O/P EST LOW 20 MIN: CPT

## 2020-07-27 RX ORDER — METHYLPREDNISOLONE 4 MG/1
TABLET ORAL
Qty: 1 KIT | Refills: 0 | Status: SHIPPED | OUTPATIENT
Start: 2020-07-27 | End: 2020-08-02

## 2020-07-27 RX ORDER — KETOROLAC TROMETHAMINE 30 MG/ML
60 INJECTION, SOLUTION INTRAMUSCULAR; INTRAVENOUS ONCE
Status: COMPLETED | OUTPATIENT
Start: 2020-07-27 | End: 2020-07-27

## 2020-07-27 RX ORDER — CYCLOBENZAPRINE HCL 10 MG
10 TABLET ORAL 3 TIMES DAILY PRN
Qty: 15 TABLET | Refills: 0 | Status: SHIPPED | OUTPATIENT
Start: 2020-07-27 | End: 2021-07-14

## 2020-07-27 RX ADMIN — KETOROLAC TROMETHAMINE 60 MG: 30 INJECTION, SOLUTION INTRAMUSCULAR at 11:27

## 2020-07-27 ASSESSMENT — PAIN DESCRIPTION - LOCATION: LOCATION: BACK

## 2020-07-27 ASSESSMENT — PAIN DESCRIPTION - FREQUENCY: FREQUENCY: CONTINUOUS

## 2020-07-27 ASSESSMENT — PAIN DESCRIPTION - PROGRESSION: CLINICAL_PROGRESSION: GRADUALLY WORSENING

## 2020-07-27 ASSESSMENT — ENCOUNTER SYMPTOMS
VOMITING: 0
BACK PAIN: 1
NAUSEA: 0
SHORTNESS OF BREATH: 0

## 2020-07-27 ASSESSMENT — PAIN DESCRIPTION - DESCRIPTORS: DESCRIPTORS: ACHING;TENDER

## 2020-07-27 ASSESSMENT — PAIN DESCRIPTION - PAIN TYPE: TYPE: ACUTE PAIN

## 2020-07-27 ASSESSMENT — PAIN - FUNCTIONAL ASSESSMENT: PAIN_FUNCTIONAL_ASSESSMENT: ACTIVITIES ARE NOT PREVENTED

## 2020-07-27 ASSESSMENT — PAIN DESCRIPTION - ORIENTATION: ORIENTATION: LOWER

## 2020-07-27 ASSESSMENT — PAIN DESCRIPTION - ONSET: ONSET: SUDDEN

## 2020-07-27 ASSESSMENT — PAIN SCALES - GENERAL: PAINLEVEL_OUTOF10: 7

## 2020-07-27 NOTE — ED PROVIDER NOTES
[place]  Urgent Care Encounter       CHIEF COMPLAINT       Chief Complaint   Patient presents with    Back Pain     lower back pain       Nurses Notes reviewed and I agree except as noted in the HPI. HISTORY OF PRESENT ILLNESS   Alva Haney is a 76 y.o. female who presents for evaluation of right-sided low back pain that occasionally radiates down the right leg. Patient states that the pain began 2 days ago while she was helping her daughter clean her house. She states that she tried to throw something into a dumpster and felt a pull in her low back. She states that she notes the pain radiating down her leg when she walks. She denies any numbness or tingling to the lower extremities or loss of bowel or bladder control. She states that she has been taking ibuprofen and icing at home, however she has not taken any medications today. The history is provided by the patient. REVIEW OF SYSTEMS     Review of Systems   Constitutional: Negative for chills and fever. Respiratory: Negative for shortness of breath. Cardiovascular: Negative for chest pain. Gastrointestinal: Negative for nausea and vomiting. Genitourinary: Negative for dysuria. Musculoskeletal: Positive for back pain. Negative for arthralgias, gait problem and myalgias. Skin: Negative for rash. Allergic/Immunologic: Negative for environmental allergies. Neurological: Negative for weakness and numbness. PAST MEDICAL HISTORY         Diagnosis Date    Arthritis     CAD (coronary artery disease) 3/13/2014    GERD (gastroesophageal reflux disease) 3/13/2014    Hyperlipidemia LDL goal <70 3/13/2014    Hypertension     Hypothyroidism     Irritable bowel syndrome     Stress incontinence (female) (male) 8/29/2018    Ulcer        SURGICALHISTORY     Patient  has a past surgical history that includes Hysterectomy; Total Thyroidectomy; Heel spur surgery; Colonoscopy;  Cholecystectomy; Cardiac grandfather, and paternal grandmother. SOCIAL HISTORY     Patient  reports that she has never smoked. She has never used smokeless tobacco. She reports that she does not drink alcohol or use drugs. PHYSICAL EXAM     ED TRIAGE VITALS  BP: (!) 191/90, Temp: 97.1 °F (36.2 °C), Pulse: 78, Resp: 18, SpO2: 98 %,Estimated body mass index is 46.59 kg/m² as calculated from the following:    Height as of 12/3/19: 5' 5\" (1.651 m). Weight as of this encounter: 280 lb (127 kg). ,No LMP recorded. Patient has had a hysterectomy. Physical Exam  Vitals signs and nursing note reviewed. Constitutional:       General: She is not in acute distress. Appearance: She is well-developed. She is not diaphoretic. Eyes:      Conjunctiva/sclera:      Right eye: Right conjunctiva is not injected. Left eye: Left conjunctiva is not injected. Pupils: Pupils are equal.   Neck:      Musculoskeletal: Normal range of motion. Cardiovascular:      Rate and Rhythm: Normal rate and regular rhythm. Heart sounds: No murmur. Pulmonary:      Effort: Pulmonary effort is normal. No respiratory distress. Breath sounds: Normal breath sounds. Musculoskeletal:      Right knee: She exhibits normal range of motion. Left knee: She exhibits normal range of motion. Lumbar back: She exhibits tenderness (right paraspinal). She exhibits no bony tenderness. Skin:     General: Skin is warm. Findings: No rash. Neurological:      Mental Status: She is alert and oriented to person, place, and time. Psychiatric:         Behavior: Behavior normal.         DIAGNOSTIC RESULTS     Labs:No results found for this visit on 07/27/20.     IMAGING:    No orders to display         EKG:  none    URGENT CARE COURSE:     Vitals:    07/27/20 1115   BP: (!) 191/90   Pulse: 78   Resp: 18   Temp: 97.1 °F (36.2 °C)   TempSrc: Temporal   SpO2: 98%   Weight: 280 lb (127 kg)       Medications   ketorolac (TORADOL) injection 60 mg (has no administration in time range)            PROCEDURES:  None    FINAL IMPRESSION      1. Acute right-sided low back pain with right-sided sciatica          DISPOSITION/ PLAN     Exam and patient history are consistent with a low back strain as well as sciatic nerve involvement down the right leg. Discussed with the patient the plan to treat with a shot of Toradol within the department and she is advised to continue ibuprofen later this evening. She is instructed to continue ice and rest as well and will be given a prescription for muscle relaxers as well as Medrol Dosepak. She is instructed to follow-up on an outpatient basis if her symptoms do not improve or seem to worsen and she is agreeable to plan as discussed. PATIENT REFERRED TO:  Noel Del Valle DO  [address] [facility], [address] / [place] [address]      DISCHARGE MEDICATIONS:  New Prescriptions    CYCLOBENZAPRINE (FLEXERIL) 10 MG TABLET    Take 1 tablet by mouth 3 times daily as needed for Muscle spasms . Do not drive or operate heavy machinery while taking this medication. METHYLPREDNISOLONE (MEDROL, ALEJANDRA,) 4 MG TABLET    Take by mouth.        Discontinued Medications    No medications on file       Current Discharge Medication List          KELIN Pablo CNP    (Please note that portions of this note were completed with a voice recognition program. Efforts were made to edit the dictations but occasionally words are mis-transcribed.)          KELIN Pablo CNP  07/27/20 7062

## 2020-07-27 NOTE — ED NOTES
All discharge instructions and medications reviewed with pt at discharge. Instructed pt to go directly to main er if experiencing shortness of breath, chest pain, abdominal pain or if symptoms worsen. Pt verbalizes understanding. Ambulatory to lobby in stable condition.       Jen Way RN  07/27/20 2607

## 2020-07-27 NOTE — ED TRIAGE NOTES
Ambulates to room in stable condition with c/o lower back pain for 3 days.   Pt states that she was throwing a cardboard box into the dumpster 3 days ago and felt pain in her lower back

## 2020-07-28 ENCOUNTER — TELEPHONE (OUTPATIENT)
Dept: FAMILY MEDICINE CLINIC | Age: 65
End: 2020-07-28

## 2020-07-28 ENCOUNTER — OFFICE VISIT (OUTPATIENT)
Dept: FAMILY MEDICINE CLINIC | Age: 65
End: 2020-07-28
Payer: MEDICARE

## 2020-07-28 VITALS
OXYGEN SATURATION: 98 % | SYSTOLIC BLOOD PRESSURE: 118 MMHG | RESPIRATION RATE: 18 BRPM | BODY MASS INDEX: 47.76 KG/M2 | TEMPERATURE: 97 F | WEIGHT: 287 LBS | HEART RATE: 54 BPM | DIASTOLIC BLOOD PRESSURE: 84 MMHG

## 2020-07-28 PROCEDURE — 1090F PRES/ABSN URINE INCON ASSESS: CPT | Performed by: FAMILY MEDICINE

## 2020-07-28 PROCEDURE — 4040F PNEUMOC VAC/ADMIN/RCVD: CPT | Performed by: FAMILY MEDICINE

## 2020-07-28 PROCEDURE — 3017F COLORECTAL CA SCREEN DOC REV: CPT | Performed by: FAMILY MEDICINE

## 2020-07-28 PROCEDURE — G8399 PT W/DXA RESULTS DOCUMENT: HCPCS | Performed by: FAMILY MEDICINE

## 2020-07-28 PROCEDURE — 99213 OFFICE O/P EST LOW 20 MIN: CPT | Performed by: FAMILY MEDICINE

## 2020-07-28 PROCEDURE — 1036F TOBACCO NON-USER: CPT | Performed by: FAMILY MEDICINE

## 2020-07-28 PROCEDURE — G8417 CALC BMI ABV UP PARAM F/U: HCPCS | Performed by: FAMILY MEDICINE

## 2020-07-28 PROCEDURE — G8427 DOCREV CUR MEDS BY ELIG CLIN: HCPCS | Performed by: FAMILY MEDICINE

## 2020-07-28 PROCEDURE — 1123F ACP DISCUSS/DSCN MKR DOCD: CPT | Performed by: FAMILY MEDICINE

## 2020-07-28 RX ORDER — TRAMADOL HYDROCHLORIDE 50 MG/1
50 TABLET ORAL EVERY 8 HOURS PRN
Qty: 15 TABLET | Refills: 0 | Status: SHIPPED | OUTPATIENT
Start: 2020-07-28 | End: 2020-12-09 | Stop reason: SDUPTHER

## 2020-07-28 ASSESSMENT — ENCOUNTER SYMPTOMS
GASTROINTESTINAL NEGATIVE: 1
BACK PAIN: 1
RESPIRATORY NEGATIVE: 1

## 2020-07-28 NOTE — PATIENT INSTRUCTIONS
You may receive a survey regarding the care you received during your visit. Your input is valuable to us. We encourage you to complete and return your survey. We hope you will choose us in the future for your healthcare needs. Patient Education        Sacroiliac Joint Pain: Care Instructions  Your Care Instructions     The sacroiliac joints connect the spine and each side of the pelvis. These joints bear the weight and stress of your torso. This makes them easy to injure. Injury or overuse of these joints may cause low back pain. Stress on these joints can cause joint pain. Sacroiliac joint pain is more common in pregnant women. Certain kinds of arthritis also may cause this type of joint pain. Home treatment may help you feel better. So can avoiding activities that stress your back. Your doctor also may recommend physical therapy. This may include doing exercises and stretches to help with pain. You may also learn to use good posture. Follow-up care is a key part of your treatment and safety. Be sure to make and go to all appointments, and call your doctor if you are having problems. It's also a good idea to know your test results and keep a list of the medicines you take. How can you care for yourself at home? · Ask your doctor about light exercises that may help your back pain. Try to do light activity throughout the day. But make sure to take rests as needed. Find a comfortable position for rest, but don't stay in one position for too long. Avoid activities that cause pain. · To apply heat, put a warm water bottle, a heating pad set on low, or a warm cloth on your back. Do not go to sleep with a heating pad on your skin. · Put ice or a cold pack on your back for 10 to 20 minutes at a time. Put a thin cloth between the ice and your skin. · If the doctor gave you a prescription medicine for pain, take it as prescribed.   · If you are not taking a prescription pain medicine, ask your doctor if you can take an over-the-counter pain medicine, such as acetaminophen (Tylenol), ibuprofen (Advil, Motrin), or naproxen (Aleve). Read and follow all instructions on the label. Take pain medicines exactly as directed. · Do not take two or more pain medicines at the same time unless the doctor told you to. Many pain medicines have acetaminophen, which is Tylenol. Too much acetaminophen (Tylenol) can be harmful. · To prevent future back pain, do exercises to stretch and strengthen your back and stomach. Learn how to use good posture, safe lifting techniques, and proper body mechanics. When should you call for help? [id number] anytime you think you may need emergency care. For example, call if:  · You are unable to move a leg at all. Call your doctor now or seek immediate medical care if:  · You have new or worse symptoms in your legs or buttocks. Symptoms may include:  ? Numbness or tingling. ? Weakness. ? Pain. · You lose bladder or bowel control. Watch closely for changes in your health, and be sure to contact your doctor if:  · You are not getting better as expected. Where can you learn more? Go to https://[employer].Orthos. org and sign in to your e[employer] account. Enter B470 in the NetMinder box to learn more about \"Sacroiliac Joint Pain: Care Instructions. \"     If you do not have an account, please click on the \"Sign Up Now\" link. Current as of: March 2, 2020               Content Version: 12.5  © 2006-2020 Healthwise, Incorporated. Care instructions adapted under license by [facility] ([facility]). If you have questions about a medical condition or this instruction, always ask your healthcare professional. [place] [address] any warranty or liability for your use of this information. Patient Education        Sacroiliac Pain: Exercises  Introduction  Here are some examples of exercises for you to try. The exercises may be suggested for a condition or for rehabilitation. Start each exercise slowly. Ease off the exercises if you start to have pain. You will be told when to start these exercises and which ones will work best for you. How to do the exercises  Knee-to-chest stretch   1. Do not do the knee-to-chest exercise if it causes or increases back or leg pain. 2. Lie on your back with your knees bent and your feet flat on the floor. You can put a small pillow under your head and neck if it is more comfortable. 3. Grasp your hands under one knee and bring the knee to your chest, keeping the other foot flat on the floor. 4. Keep your lower back pressed to the floor. Hold for at least 15 to 30 seconds. 5. Relax and lower the knee to the starting position. Repeat with the other leg. 6. Repeat 2 to 4 times with each leg. 7. To get more stretch, keep your other leg flat on the floor while pulling your knee to your chest.    Bridging   1. Lie on your back with both knees bent. Your knees should be bent about 90 degrees. 2. Tighten your belly muscles by pulling in your belly button toward your spine. Then push your feet into the floor, squeeze your buttocks, and lift your hips off the floor until your shoulders, hips, and knees are all in a straight line. 3. Hold for about 6 seconds as you continue to breathe normally, and then slowly lower your hips back down to the floor and rest for up to 10 seconds. 4. Repeat 8 to 12 times. Hip extension   1. Get down on your hands and knees on the floor. 2. Keeping your back and neck straight, lift one leg straight out behind you. When you lift your leg, keep your hips level. Don't let your back twist, and don't let your hip drop toward the floor. 3. Hold for 6 seconds. Repeat 8 to 12 times with each leg. 4. If you feel steady and strong when you do this exercise, you can make it more difficult. To do this, when you lift your leg, also lift the opposite arm straight out in front of you.  For example, lift the left leg and the right arm at the same time. (This is sometimes called the \"bird dog exercise. \") Hold for 6 seconds, and repeat 8 to 12 times on each side. Clamshell   1. Lie on your side with a pillow under your head. Keep your feet and knees together and your knees bent. 2. Raise your top knee, but keep your feet together. Do not let your hips roll back. Your legs should open up like a clamshell. 3. Hold for 6 seconds. 4. Slowly lower your knee back down. Rest for 10 seconds. 5. Repeat 8 to 12 times. 6. Switch to your other side and repeat steps 1 through 5. Hamstring wall stretch   1. Lie on your back in a doorway, with one leg through the open door. 2. Slide your affected leg up the wall to straighten your knee. You should feel a gentle stretch down the back of your leg. 3. Hold the stretch for at least 1 minute to begin. Then try to lengthen the time you hold the stretch to as long as 6 minutes. 4. Switch legs, and repeat steps 1 through 3.  5. Repeat 2 to 4 times. 6. If you do not have a place to do this exercise in a doorway, there is another way to do it:  7. Lie on your back, and bend one knee. 8. Loop a towel under the ball and toes of that foot, and hold the ends of the towel in your hands. 9. Straighten your knee, and slowly pull back on the towel. You should feel a gentle stretch down the back of your leg. 10. Switch legs, and repeat steps 1 through 3.  11. Repeat 2 to 4 times. 1. Do not arch your back. 2. Do not bend either knee. 3. Keep one heel touching the floor and the other heel touching the wall. Do not point your toes. Lower abdominal strengthening   1. Lie on your back with your knees bent and your feet flat on the floor. 2. Tighten your belly muscles by pulling your belly button in toward your spine. 3. Lift one foot off the floor and bring your knee toward your chest, so that your knee is straight above your hip and your leg is bent like the letter \"L. \"  4.  Lift the other knee up to the same position. 5. Lower one leg at a time to the starting position. 6. Keep alternating legs until you have lifted each leg 8 to 12 times. 7. Be sure to keep your belly muscles tight and your back still as you are moving your legs. Be sure to breathe normally. Piriformis stretch   1. Lie on your back with your legs straight. 2. Lift your affected leg, and bend your knee. With your opposite hand, reach across your body, and then gently pull your knee toward your opposite shoulder. 3. Hold the stretch for 15 to 30 seconds. 4. Switch legs and repeat steps 1 through 3.  5. Repeat 2 to 4 times. Follow-up care is a key part of your treatment and safety. Be sure to make and go to all appointments, and call your doctor if you are having problems. It's also a good idea to know your test results and keep a list of the medicines you take. Where can you learn more? Go to https://[employer]por.Studentgems. org and sign in to your Rx Networks account. Enter I957 in the Ethos Lending box to learn more about \"Sacroiliac Pain: Exercises. \"     If you do not have an account, please click on the \"Sign Up Now\" link. Current as of: March 2, 2020               Content Version: 12.5  © 4450-5982 Healthwise, Incorporated. Care instructions adapted under license by [facility] ([facility]). If you have questions about a medical condition or this instruction, always ask your healthcare professional. [place] [address] any warranty or liability for your use of this information.

## 2020-07-28 NOTE — PROGRESS NOTES
Visit Information    Have you changed or started any medications since your last visit including any over-the-counter medicines, vitamins, or herbal medicines? yes -    Are you having any side effects from any of your medications? -  no  Have you stopped taking any of your medications? Is so, why? -  no    Have you seen any other physician or provider since your last visit? Yes - Records Obtained  Have you had any other diagnostic tests since your last visit? Yes - Records Obtained  Have you been seen in the emergency room and/or had an admission to a hospital since we last saw you? Yes - Records Obtained  Have you had your routine dental cleaning in the past 6 months? na    Have you activated your [employer] account? If not, what are your barriers?  Yes     Patient Care Team:  Dallas Sims DO as PCP - General (Family Medicine)  Dallas Sims DO as PCP - Leighann Louis Provider  Hina Frey DPM as Consulting Physician (Podiatry)  KELIN Salazar - CNP as Nurse Practitioner (Nurse Practitioner)    Medical History Review  Past Medical, Family, and Social History reviewed and does contribute to the patient presenting condition    Health Maintenance   Topic Date Due    DTaP/Tdap/Td vaccine (1 - Tdap) 01/17/1973    Shingles Vaccine (1 of 2) 01/17/2004    Annual Wellness Visit (AWV)  05/29/2019    A1C test (Diabetic or Prediabetic)  06/03/2020    Lipid screen  06/03/2020    TSH testing  06/03/2020    Potassium monitoring  08/26/2020    Creatinine monitoring  08/26/2020    Flu vaccine (1) 09/01/2020    Pneumococcal 65+ years Vaccine (2 of 2 - PPSV23) 09/17/2020    Breast cancer screen  06/06/2021    Colon cancer screen fecal DNA test (Cologuard)  08/01/2022    DEXA (modify frequency per FRAX score)  Completed    Hepatitis C screen  Completed    Hepatitis A vaccine  Aged Out    Hepatitis B vaccine  Aged Out    Hib vaccine  Aged Out    Meningococcal (ACWY) vaccine  Aged Out
SURGERY Left     HEEL SPUR SURGERY      HYSTERECTOMY      KNEE SURGERY  2009    TOTAL THYROIDECTOMY      UPPER GASTROINTESTINAL ENDOSCOPY  2009     Prior to Admission medications    Medication Sig Start Date End Date Taking? Authorizing Provider   cyclobenzaprine (FLEXERIL) 10 MG tablet Take 1 tablet by mouth 3 times daily as needed for Muscle spasms . Do not drive or operate heavy machinery while taking this medication.  7/27/20  Yes KELIN Pablo CNP   methylPREDNISolone (MEDROL, ALEJANDRA,) 4 MG tablet Take by mouth. 7/27/20 8/2/20 Yes KELIN Pablo CNP   amitriptyline (ELAVIL) 25 MG tablet Take 1 tablet by mouth nightly 7/8/20  Yes Noel Del Valle DO   lisinopril (PRINIVIL;ZESTRIL) 5 MG tablet TAKE 1 TABLET BY MOUTH EVERY DAY 6/29/20  Yes KELIN Lay CNP   meclizine (ANTIVERT) 25 MG tablet Take 1 tablet by mouth 3 times daily as needed for Dizziness 6/29/20  Yes KELIN Lay CNP   zinc gluconate 50 MG tablet Take 50 mg by mouth daily   Yes Historical Provider, MD   Cyanocobalamin (B-12 PO) Take by mouth daily   Yes Historical Provider, MD   Ascorbic Acid (LEIGHANN-C PO) Take by mouth daily   Yes Historical Provider, MD   ondansetron (ZOFRAN-ODT) 4 MG disintegrating tablet DISSOLVE 1 TABLET ON THE TONGUE EVERY 8 HOURS AS NEEDED FOR NAUSEA 5/29/20  Yes Noel Del Valle DO   POTASSIUM PO Take by mouth   Yes Historical Provider, MD   valACYclovir (VALTREX) 1 g tablet Take 2 tabs BID for 1 day at onset of cold sore 1/13/20  Yes Noel Del Valle DO   atorvastatin (LIPITOR) 40 MG tablet Take 1 tablet by mouth daily 12/3/19  Yes Noel Del Valle DO   Magnesium 400 MG TABS  6/4/19  Yes Historical Provider, MD   omeprazole (PRILOSEC) 40 MG delayed release capsule TAKE 1 CAPSULE BY MOUTH DAILY WITH BREAKFAST 6/5/19  Yes Noel Del Valle DO   metoprolol tartrate (LOPRESSOR) 25 MG tablet TAKE ONE-HALF TABLET BY MOUTH TWICE DAILY 6/5/19  Yes Noel Del Valle DO

## 2020-09-01 RX ORDER — IBUPROFEN 600 MG/1
TABLET ORAL
Qty: 120 TABLET | Refills: 0 | Status: SHIPPED | OUTPATIENT
Start: 2020-09-01 | End: 2020-11-02 | Stop reason: SDUPTHER

## 2020-09-01 NOTE — TELEPHONE ENCOUNTER
Jasmine Swenson called requesting a refill on the following medications:  Requested Prescriptions     Pending Prescriptions Disp Refills    ibuprofen (ADVIL;MOTRIN) 600 MG tablet 360 tablet 0     Sig: TAKE 1 TABLET EVERY 6 HOURS AS NEEDED FOR PAIN     Pharmacy verified:  yes      Date of last visit: 7-  Date of next visit (if applicable): Visit date not found

## 2020-09-14 RX ORDER — ONDANSETRON 4 MG/1
TABLET, ORALLY DISINTEGRATING ORAL
Qty: 20 TABLET | Refills: 0 | Status: SHIPPED | OUTPATIENT
Start: 2020-09-14 | End: 2020-11-23 | Stop reason: SDUPTHER

## 2020-09-14 RX ORDER — VALACYCLOVIR HYDROCHLORIDE 1 G/1
TABLET, FILM COATED ORAL
Qty: 20 TABLET | Refills: 2 | Status: SHIPPED | OUTPATIENT
Start: 2020-09-14 | End: 2021-05-19 | Stop reason: SDUPTHER

## 2020-09-14 NOTE — TELEPHONE ENCOUNTER
Patient requesting refill of Valtrex and Zofran to [facility] [employer] [employer]. Will check with pharmacy after 4pm regarding.   Please refill if appropriate

## 2020-09-21 ENCOUNTER — OFFICE VISIT (OUTPATIENT)
Dept: FAMILY MEDICINE CLINIC | Age: 65
End: 2020-09-21
Payer: MEDICARE

## 2020-09-21 VITALS
WEIGHT: 283.8 LBS | RESPIRATION RATE: 18 BRPM | HEART RATE: 108 BPM | TEMPERATURE: 97.3 F | BODY MASS INDEX: 47.23 KG/M2 | DIASTOLIC BLOOD PRESSURE: 98 MMHG | SYSTOLIC BLOOD PRESSURE: 148 MMHG

## 2020-09-21 PROCEDURE — 20610 DRAIN/INJ JOINT/BURSA W/O US: CPT | Performed by: FAMILY MEDICINE

## 2020-09-21 PROCEDURE — G8427 DOCREV CUR MEDS BY ELIG CLIN: HCPCS | Performed by: FAMILY MEDICINE

## 2020-09-21 PROCEDURE — 4040F PNEUMOC VAC/ADMIN/RCVD: CPT | Performed by: FAMILY MEDICINE

## 2020-09-21 PROCEDURE — G8417 CALC BMI ABV UP PARAM F/U: HCPCS | Performed by: FAMILY MEDICINE

## 2020-09-21 PROCEDURE — 1123F ACP DISCUSS/DSCN MKR DOCD: CPT | Performed by: FAMILY MEDICINE

## 2020-09-21 PROCEDURE — 1090F PRES/ABSN URINE INCON ASSESS: CPT | Performed by: FAMILY MEDICINE

## 2020-09-21 PROCEDURE — 99214 OFFICE O/P EST MOD 30 MIN: CPT | Performed by: FAMILY MEDICINE

## 2020-09-21 PROCEDURE — 1036F TOBACCO NON-USER: CPT | Performed by: FAMILY MEDICINE

## 2020-09-21 PROCEDURE — 3017F COLORECTAL CA SCREEN DOC REV: CPT | Performed by: FAMILY MEDICINE

## 2020-09-21 PROCEDURE — G8399 PT W/DXA RESULTS DOCUMENT: HCPCS | Performed by: FAMILY MEDICINE

## 2020-09-21 RX ORDER — METHYLPREDNISOLONE ACETATE 80 MG/ML
80 INJECTION, SUSPENSION INTRA-ARTICULAR; INTRALESIONAL; INTRAMUSCULAR; SOFT TISSUE ONCE
Status: COMPLETED | OUTPATIENT
Start: 2020-09-21 | End: 2020-09-21

## 2020-09-21 RX ADMIN — METHYLPREDNISOLONE ACETATE 80 MG: 80 INJECTION, SUSPENSION INTRA-ARTICULAR; INTRALESIONAL; INTRAMUSCULAR; SOFT TISSUE at 13:45

## 2020-09-21 RX ADMIN — METHYLPREDNISOLONE ACETATE 80 MG: 80 INJECTION, SUSPENSION INTRA-ARTICULAR; INTRALESIONAL; INTRAMUSCULAR; SOFT TISSUE at 13:47

## 2020-09-21 ASSESSMENT — ENCOUNTER SYMPTOMS
GASTROINTESTINAL NEGATIVE: 1
RESPIRATORY NEGATIVE: 1

## 2020-09-21 NOTE — PROGRESS NOTES
Subjective:      Patient ID: Alva Haney is a 76 y.o. female. HPI:    Chief Complaint   Patient presents with    Knee Pain     bilateral     Pt here for bilateral knee pain, this is chronic in nature. Has seen [facility], they want her to lose weight before replacing knees. BP on high end today, attributes this to her knee pain. Compliant with medications. BP Readings from Last 3 Encounters:   09/21/20 (!) 148/98   07/28/20 118/84   07/27/20 (!) 162/81     Weight is stable. Wt Readings from Last 3 Encounters:   09/21/20 283 lb 12.8 oz (128.7 kg)   07/28/20 287 lb (130.2 kg)   07/27/20 280 lb (127 kg)     Hx of CAD, follows periodically with Cardio. GERD controlled on the omeprazole. Due for labs. Patient Active Problem List   Diagnosis    CAD (coronary artery disease) 7/2014- mid lad 50 % patent LM< LCX and RCA    Hyperlipidemia LDL goal <70    Hypothyroid    History of total thyroidectomy    GERD (gastroesophageal reflux disease)    PUD (peptic ulcer disease)    IBS (irritable bowel syndrome)    IFG (impaired fasting glucose)    Vitamin D deficiency    SOB (shortness of breath) on exertion    Chest pain    Heart palpitations- intermittent    Abnormal nuclear stress test- anteroior and anteroseptal ischemia    Chest pain, atypical    Abdominal pain    Stress incontinence (female) (male)     Past Surgical History:   Procedure Laterality Date    CARDIAC CATHETERIZATION  2008    CARPAL TUNNEL RELEASE  2008    CHOLECYSTECTOMY      COLONOSCOPY      HAMMER TOE SURGERY Left     HEEL SPUR SURGERY      HYSTERECTOMY      KNEE SURGERY  2009    TOTAL THYROIDECTOMY      UPPER GASTROINTESTINAL ENDOSCOPY  2009     Prior to Admission medications    Medication Sig Start Date End Date Taking?  Authorizing Provider   valACYclovir (VALTREX) 1 g tablet Take 2 tabs BID for 1 day at onset of cold sore 9/14/20  Yes Luis Lance, DO   ondansetron (ZOFRAN-ODT) 4 MG disintegrating tablet are negative. Objective:   Physical Exam  Vitals signs and nursing note reviewed. Constitutional:       Appearance: She is well-developed. HENT:      Head: Normocephalic and atraumatic. Right Ear: Tympanic membrane normal.      Left Ear: Tympanic membrane normal.   Cardiovascular:      Rate and Rhythm: Normal rate and regular rhythm. Heart sounds: Normal heart sounds. No murmur. Pulmonary:      Effort: Pulmonary effort is normal.      Breath sounds: Normal breath sounds. Abdominal:      General: Bowel sounds are normal.      Palpations: Abdomen is soft. Musculoskeletal:      Right knee: She exhibits decreased range of motion and swelling. Tenderness found. Medial joint line and lateral joint line tenderness noted. Left knee: She exhibits decreased range of motion and swelling. Tenderness found. Medial joint line and lateral joint line tenderness noted. Skin:     General: Skin is warm and dry. Neurological:      Mental Status: She is alert and oriented to person, place, and time. Psychiatric:         Behavior: Behavior normal.         Assessment:       Diagnosis Orders   1. Bilateral primary osteoarthritis of knee  methylPREDNISolone acetate (DEPO-MEDROL) injection 80 mg    methylPREDNISolone acetate (DEPO-MEDROL) injection 80 mg    20610 - SD DRAIN/INJECT LARGE JOINT/BURSA   2. Essential hypertension     3. Hyperlipidemia LDL goal <70  Lipid Panel    Comprehensive Metabolic Panel    TSH with Reflex   4. IFG (impaired fasting glucose)  Comprehensive Metabolic Panel    Hemoglobin A1C   5. Gastroesophageal reflux disease, esophagitis presence not specified  CBC Auto Differential    Magnesium   6. Vitamin D insufficiency  Vitamin D 25 Hydroxy           Plan:      -  Chronic medical problems stable  -  Continue current medications  -  Follow up with specialists as scheduled  -  Check labs above, will call  -  Risks/benefits discussed.   After patient consent obtained, the Bilateral

## 2020-09-21 NOTE — PROGRESS NOTES
methylPREDNISolone acetate (DEPO-MEDROL) injection 80 mg     Admin Date  09/21/2020  13:45 Action  Given Dose  80 mg Route  Intra-articular Site  Knee Right Administered By  Alana Crawford LPN    Ordering Provider:  Art Castellanos DO    NDC:  0652-1225-97    Lot#:  [id number]    :  [employer]. Patient Supplied?:  No    Comments:  EXP 08/2021           Admin Date  09/21/2020  13:47 Action  Given Dose  80 mg Route  Intra-articular Site  Knee Left Administered By  Alana Crawford LPN    Ordering Provider:  Atr Castellanos DO    NDC:  1036-0864-13    Lot#:  [id number]    :  [id number]. Patient Supplied?:  No    Comments:  EXP 08/2021                  0.5% Bupivacaine 250mg/50ml  NDC 5257-7993-43  LOT -[id number]  LOT 04/01/2021      After obtaining consent, Dr. Nickolas Arroyo, injected DepoMedrol 80mg and 2ml of 0.5% Bupivacaine given into the right knee and DepoMedrol 80mg and 2ml of 0.5% Bupivacaine into the left knee. Patient instructed to remain in clinic for 20 minutes afterwards, and to report any adverse reaction to me immediately. Patient tolerated well and without incident ABN signed and icing instructions given to the patient.

## 2020-11-02 ENCOUNTER — NURSE TRIAGE (OUTPATIENT)
Dept: OTHER | Facility: CLINIC | Age: 65
End: 2020-11-02

## 2020-11-02 RX ORDER — IBUPROFEN 600 MG/1
TABLET ORAL
Qty: 120 TABLET | Refills: 0 | Status: SHIPPED | OUTPATIENT
Start: 2020-11-02 | End: 2020-11-23 | Stop reason: SDUPTHER

## 2020-11-02 NOTE — TELEPHONE ENCOUNTER
Stuffy nose, cough. Denies pain. Needs script refilled. Reason for Disposition   Cough has been present for > 3 weeks    Answer Assessment - Initial Assessment Questions  1. ONSET: \"When did the cough begin? \"       Last few months    2. SEVERITY: \"How bad is the cough today? \"       Usual    3. RESPIRATORY DISTRESS: \"Describe your breathing. \"       Fine    4. FEVER: \"Do you have a fever? \" If so, ask: \"What is your temperature, how was it measured, and when did it start? \"      Denies    5. HEMOPTYSIS: \"Are you coughing up any blood? \" If so ask: \"How much? \" (flecks, streaks, tablespoons, etc.)      Denies    6. TREATMENT: \"What have you done so far to treat the cough? \" (e.g., meds, fluids, humidifier)      Denies    7. CARDIAC HISTORY: \"Do you have any history of heart disease? \" (e.g., heart attack, congestive heart failure)       Denies    8. LUNG HISTORY: \"Do you have any history of lung disease? \"  (e.g., pulmonary embolus, asthma, emphysema)      Denies    9. PE RISK FACTORS: \"Do you have a history of blood clots? \" (or: recent major surgery, recent prolonged travel, bedridden)      Denies    10. OTHER SYMPTOMS: \"Do you have any other symptoms? (e.g., runny nose, wheezing, chest pain)        Runny nose    11. PREGNANCY: \"Is there any chance you are pregnant? \" \"When was your last menstrual period? \"        Na    12. TRAVEL: \"Have you traveled out of the country in the last month? \" (e.g., travel history, exposures)        Denies    Protocols used: COUGH-ADULT-[place]    Patient called pre-service center [facility]) to schedule appointment, with red flag complaint, transferred to RN access for triage. See above questions and answers. Caller talking full sentences without any distress on phone. Discussed disposition and patient agreeable. Discussed potential consequences for not following disposition recommendation. Aware to call back with any concerns or persistent, worsening, or new symptoms develop.       Warm transfer

## 2020-11-02 NOTE — TELEPHONE ENCOUNTER
Anni Payne called requesting a refill on the following medications:  Requested Prescriptions     Pending Prescriptions Disp Refills    ibuprofen (ADVIL;MOTRIN) 600 MG tablet 120 tablet 0     Sig: TAKE 1 TABLET EVERY 6 HOURS AS NEEDED FOR PAIN     Pharmacy verified: Yes  . pv      Date of last visit: 09/21/20  Date of next visit (if applicable): Visit date not found    Pt states she is out of pills.

## 2020-11-12 ENCOUNTER — TELEPHONE (OUTPATIENT)
Dept: CARDIOLOGY CLINIC | Age: 65
End: 2020-11-12

## 2020-11-23 ENCOUNTER — OFFICE VISIT (OUTPATIENT)
Dept: FAMILY MEDICINE CLINIC | Age: 65
End: 2020-11-23
Payer: MEDICARE

## 2020-11-23 ENCOUNTER — NURSE TRIAGE (OUTPATIENT)
Dept: OTHER | Facility: CLINIC | Age: 65
End: 2020-11-23

## 2020-11-23 VITALS
OXYGEN SATURATION: 97 % | SYSTOLIC BLOOD PRESSURE: 126 MMHG | WEIGHT: 285.1 LBS | DIASTOLIC BLOOD PRESSURE: 84 MMHG | HEART RATE: 77 BPM | BODY MASS INDEX: 47.5 KG/M2 | HEIGHT: 65 IN | TEMPERATURE: 98.2 F

## 2020-11-23 PROCEDURE — 4040F PNEUMOC VAC/ADMIN/RCVD: CPT | Performed by: NURSE PRACTITIONER

## 2020-11-23 PROCEDURE — G8427 DOCREV CUR MEDS BY ELIG CLIN: HCPCS | Performed by: NURSE PRACTITIONER

## 2020-11-23 PROCEDURE — 3017F COLORECTAL CA SCREEN DOC REV: CPT | Performed by: NURSE PRACTITIONER

## 2020-11-23 PROCEDURE — 96372 THER/PROPH/DIAG INJ SC/IM: CPT | Performed by: NURSE PRACTITIONER

## 2020-11-23 PROCEDURE — 99213 OFFICE O/P EST LOW 20 MIN: CPT | Performed by: NURSE PRACTITIONER

## 2020-11-23 PROCEDURE — 1036F TOBACCO NON-USER: CPT | Performed by: NURSE PRACTITIONER

## 2020-11-23 PROCEDURE — 1090F PRES/ABSN URINE INCON ASSESS: CPT | Performed by: NURSE PRACTITIONER

## 2020-11-23 PROCEDURE — G8417 CALC BMI ABV UP PARAM F/U: HCPCS | Performed by: NURSE PRACTITIONER

## 2020-11-23 PROCEDURE — G8399 PT W/DXA RESULTS DOCUMENT: HCPCS | Performed by: NURSE PRACTITIONER

## 2020-11-23 PROCEDURE — 1123F ACP DISCUSS/DSCN MKR DOCD: CPT | Performed by: NURSE PRACTITIONER

## 2020-11-23 PROCEDURE — G8482 FLU IMMUNIZE ORDER/ADMIN: HCPCS | Performed by: NURSE PRACTITIONER

## 2020-11-23 RX ORDER — METHYLPREDNISOLONE ACETATE 40 MG/ML
40 INJECTION, SUSPENSION INTRA-ARTICULAR; INTRALESIONAL; INTRAMUSCULAR; SOFT TISSUE ONCE
Status: COMPLETED | OUTPATIENT
Start: 2020-11-23 | End: 2020-11-23

## 2020-11-23 RX ORDER — IBUPROFEN 600 MG/1
TABLET ORAL
Qty: 120 TABLET | Refills: 0 | Status: SHIPPED | OUTPATIENT
Start: 2020-11-23 | End: 2020-12-16

## 2020-11-23 RX ORDER — METHYLPREDNISOLONE ACETATE 80 MG/ML
80 INJECTION, SUSPENSION INTRA-ARTICULAR; INTRALESIONAL; INTRAMUSCULAR; SOFT TISSUE ONCE
Status: COMPLETED | OUTPATIENT
Start: 2020-11-23 | End: 2020-11-23

## 2020-11-23 RX ORDER — ONDANSETRON 4 MG/1
TABLET, ORALLY DISINTEGRATING ORAL
Qty: 20 TABLET | Refills: 0 | Status: SHIPPED | OUTPATIENT
Start: 2020-11-23 | End: 2020-12-16 | Stop reason: SDUPTHER

## 2020-11-23 RX ADMIN — METHYLPREDNISOLONE ACETATE 80 MG: 80 INJECTION, SUSPENSION INTRA-ARTICULAR; INTRALESIONAL; INTRAMUSCULAR; SOFT TISSUE at 11:52

## 2020-11-23 RX ADMIN — METHYLPREDNISOLONE ACETATE 40 MG: 40 INJECTION, SUSPENSION INTRA-ARTICULAR; INTRALESIONAL; INTRAMUSCULAR; SOFT TISSUE at 11:51

## 2020-11-23 ASSESSMENT — ENCOUNTER SYMPTOMS: RESPIRATORY NEGATIVE: 1

## 2020-11-23 NOTE — PROGRESS NOTES
Subjective:      Patient ID: Burton Patel is a 76 y.o. female. HPI :Acute for shoulder pain    Chief Complaint   Patient presents with    Shoulder Pain     left x 5 days    Fall     pt states she does not feel the shoulder pain is due to fall       Onset of 5 days with shoulder pain. NKI. Previous fall did not cause shoulder pain. Solitario Lente on chest.  Current pain in shoulder came on since increase work at work. She is lifting a lot more. Works everyday. Pain in shoulder at night. Pain with ROM. FROM. Patient Active Problem List   Diagnosis    CAD (coronary artery disease) 7/2014- mid lad 52 % patent LM< LCX and RCA    Hyperlipidemia LDL goal <70    Hypothyroid    History of total thyroidectomy    GERD (gastroesophageal reflux disease)    PUD (peptic ulcer disease)    IBS (irritable bowel syndrome)    IFG (impaired fasting glucose)    Vitamin D deficiency    SOB (shortness of breath) on exertion    Chest pain    Heart palpitations- intermittent    Abnormal nuclear stress test- anteroior and anteroseptal ischemia    Chest pain, atypical    Abdominal pain    Stress incontinence (female) (male)         Review of Systems   Constitutional: Negative. Respiratory: Negative. Cardiovascular: Negative. Musculoskeletal: Positive for arthralgias. Objective:   Physical Exam  Constitutional:       General: She is not in acute distress. Appearance: She is not ill-appearing. Cardiovascular:      Rate and Rhythm: Normal rate and regular rhythm. Pulses: Normal pulses. Heart sounds: Normal heart sounds. Pulmonary:      Effort: Pulmonary effort is normal.      Breath sounds: Normal breath sounds. Musculoskeletal:      Left shoulder: She exhibits decreased range of motion, tenderness and pain. She exhibits no bony tenderness and normal strength. Arms:    Neurological:      Mental Status: She is alert. Assessment:       Diagnosis Orders   1.  Shoulder tendonitis, left  methylPREDNISolone acetate (DEPO-MEDROL) injection 80 mg    methylPREDNISolone acetate (DEPO-MEDROL) injection 40 mg   2. Polyarthralgia  ibuprofen (ADVIL;MOTRIN) 600 MG tablet   3.  Nausea  ondansetron (ZOFRAN-ODT) 4 MG disintegrating tablet           Plan:      Shoulder EXAM BENIGN - overuse injury  DEPO 120 IM  Shoulder stretches discussed  Refill as above  RTO if symptoms worsen or stay the same          Carol Novoa, APRN - CNP

## 2020-11-23 NOTE — TELEPHONE ENCOUNTER
Received call from [facility]. Pt hung up before call was transferred. Attempted to return call to pt, no answer, left message to call office if still needs appt. Attention Provider: Thank you for allowing me to participate in the care of your patient. The  patient was connected to triage in response to information provided to the ECC. Please do not respond through this encounter as the response is not directed to a shared pool.         Reason for Disposition   Message left on identified voicemail    Protocols used: NO CONTACT OR DUPLICATE CONTACT CALL-ADULT-[place]

## 2020-12-09 ENCOUNTER — HOSPITAL ENCOUNTER (OUTPATIENT)
Dept: GENERAL RADIOLOGY | Age: 65
Discharge: HOME | End: 2020-12-09
Payer: MEDICARE

## 2020-12-09 ENCOUNTER — OFFICE VISIT (OUTPATIENT)
Dept: FAMILY MEDICINE CLINIC | Age: 65
End: 2020-12-09
Payer: MEDICARE

## 2020-12-09 ENCOUNTER — HOSPITAL ENCOUNTER (OUTPATIENT)
Age: 65
Discharge: HOME | End: 2020-12-09
Payer: MEDICARE

## 2020-12-09 VITALS
RESPIRATION RATE: 20 BRPM | WEIGHT: 284.2 LBS | HEART RATE: 53 BPM | BODY MASS INDEX: 47.29 KG/M2 | SYSTOLIC BLOOD PRESSURE: 126 MMHG | DIASTOLIC BLOOD PRESSURE: 60 MMHG | TEMPERATURE: 97.2 F

## 2020-12-09 PROCEDURE — G8482 FLU IMMUNIZE ORDER/ADMIN: HCPCS | Performed by: FAMILY MEDICINE

## 2020-12-09 PROCEDURE — 3017F COLORECTAL CA SCREEN DOC REV: CPT | Performed by: FAMILY MEDICINE

## 2020-12-09 PROCEDURE — G8417 CALC BMI ABV UP PARAM F/U: HCPCS | Performed by: FAMILY MEDICINE

## 2020-12-09 PROCEDURE — 1036F TOBACCO NON-USER: CPT | Performed by: FAMILY MEDICINE

## 2020-12-09 PROCEDURE — 1123F ACP DISCUSS/DSCN MKR DOCD: CPT | Performed by: FAMILY MEDICINE

## 2020-12-09 PROCEDURE — 99213 OFFICE O/P EST LOW 20 MIN: CPT | Performed by: FAMILY MEDICINE

## 2020-12-09 PROCEDURE — 1090F PRES/ABSN URINE INCON ASSESS: CPT | Performed by: FAMILY MEDICINE

## 2020-12-09 PROCEDURE — G8427 DOCREV CUR MEDS BY ELIG CLIN: HCPCS | Performed by: FAMILY MEDICINE

## 2020-12-09 PROCEDURE — 96372 THER/PROPH/DIAG INJ SC/IM: CPT | Performed by: FAMILY MEDICINE

## 2020-12-09 PROCEDURE — 73502 X-RAY EXAM HIP UNI 2-3 VIEWS: CPT

## 2020-12-09 PROCEDURE — 4040F PNEUMOC VAC/ADMIN/RCVD: CPT | Performed by: FAMILY MEDICINE

## 2020-12-09 PROCEDURE — G8399 PT W/DXA RESULTS DOCUMENT: HCPCS | Performed by: FAMILY MEDICINE

## 2020-12-09 RX ORDER — TRAMADOL HYDROCHLORIDE 50 MG/1
50 TABLET ORAL EVERY 8 HOURS PRN
Qty: 15 TABLET | Refills: 0 | Status: SHIPPED | OUTPATIENT
Start: 2020-12-09 | End: 2020-12-14

## 2020-12-09 RX ORDER — METHYLPREDNISOLONE ACETATE 80 MG/ML
80 INJECTION, SUSPENSION INTRA-ARTICULAR; INTRALESIONAL; INTRAMUSCULAR; SOFT TISSUE ONCE
Status: COMPLETED | OUTPATIENT
Start: 2020-12-09 | End: 2020-12-09

## 2020-12-09 RX ORDER — METHYLPREDNISOLONE ACETATE 40 MG/ML
40 INJECTION, SUSPENSION INTRA-ARTICULAR; INTRALESIONAL; INTRAMUSCULAR; SOFT TISSUE ONCE
Status: COMPLETED | OUTPATIENT
Start: 2020-12-09 | End: 2020-12-09

## 2020-12-09 RX ADMIN — METHYLPREDNISOLONE ACETATE 40 MG: 40 INJECTION, SUSPENSION INTRA-ARTICULAR; INTRALESIONAL; INTRAMUSCULAR; SOFT TISSUE at 14:55

## 2020-12-09 RX ADMIN — METHYLPREDNISOLONE ACETATE 80 MG: 80 INJECTION, SUSPENSION INTRA-ARTICULAR; INTRALESIONAL; INTRAMUSCULAR; SOFT TISSUE at 14:55

## 2020-12-09 NOTE — PROGRESS NOTES
Subjective:      Patient ID: Antonio Stover is a 76 y.o. female. HPI:    Chief Complaint   Patient presents with    Lower Back Pain     radiates into right groin down the leg stoppping at the knee, weakness in right thigh present for 1 week pt using ICE prn and prescribed Ibuprofen      Pt here for LBP with radiation into the right groin and into the upper thigh region. This started a week ago, NKI. She is using ice and Motrin with no relief. Pt twists a lot at her job, not sure if that did it. Denies b-b changes. Unable to work secondary to the pain. She does have some numbness involving her right upper thigh region. Patient Active Problem List   Diagnosis    CAD (coronary artery disease) 7/2014- mid lad 50 % patent LM< LCX and RCA    Hyperlipidemia LDL goal <70    Hypothyroid    History of total thyroidectomy    GERD (gastroesophageal reflux disease)    PUD (peptic ulcer disease)    IBS (irritable bowel syndrome)    IFG (impaired fasting glucose)    Vitamin D deficiency    SOB (shortness of breath) on exertion    Chest pain    Heart palpitations- intermittent    Abnormal nuclear stress test- anteroior and anteroseptal ischemia    Chest pain, atypical    Abdominal pain    Stress incontinence (female) (male)     Past Surgical History:   Procedure Laterality Date    CARDIAC CATHETERIZATION  2008    CARPAL TUNNEL RELEASE  2008    CHOLECYSTECTOMY      COLONOSCOPY      HAMMER TOE SURGERY Left     HEEL SPUR SURGERY      HYSTERECTOMY      KNEE SURGERY  2009    TOTAL THYROIDECTOMY      UPPER GASTROINTESTINAL ENDOSCOPY  2009     Prior to Admission medications    Medication Sig Start Date End Date Taking? Authorizing Provider   traMADol (ULTRAM) 50 MG tablet Take 1 tablet by mouth every 8 hours as needed for Pain for up to 5 days.  12/9/20 12/14/20 Yes Marval Heimlich,    ibuprofen (ADVIL;MOTRIN) 600 MG tablet TAKE 1 TABLET EVERY 6 HOURS AS NEEDED FOR PAIN 11/23/20  Yes Musculoskeletal: Positive for arthralgias (right hip pain), back pain and gait problem. All other systems reviewed and are negative. Objective:   Physical Exam  Vitals signs and nursing note reviewed. Constitutional:       General: She is not in acute distress. Appearance: Normal appearance. She is well-developed. HENT:      Head: Normocephalic and atraumatic. Right Ear: Tympanic membrane normal.      Left Ear: Tympanic membrane normal.   Eyes:      Conjunctiva/sclera: Conjunctivae normal.   Neck:      Musculoskeletal: Neck supple. Cardiovascular:      Rate and Rhythm: Normal rate and regular rhythm. Heart sounds: Normal heart sounds. No murmur. Pulmonary:      Effort: Pulmonary effort is normal.      Breath sounds: Normal breath sounds. No wheezing, rhonchi or rales. Abdominal:      General: There is no distension. Musculoskeletal:      Right hip: She exhibits decreased range of motion and tenderness (increased pain with passive IROM). Lumbar back: She exhibits decreased range of motion, tenderness and pain. Back:    Skin:     General: Skin is warm and dry. Findings: No rash (on exposed surfaces). Neurological:      General: No focal deficit present. Mental Status: She is alert. Psychiatric:         Attention and Perception: Attention normal.         Mood and Affect: Mood normal.         Speech: Speech normal.         Behavior: Behavior normal. Behavior is cooperative. Thought Content: Thought content normal.         Judgment: Judgment normal.         Assessment:       Diagnosis Orders   1.  Acute right hip pain  XR HIP RIGHT (2-3 VIEWS)   2. Sacroiliac pain  traMADol (ULTRAM) 50 MG tablet    methylPREDNISolone acetate (DEPO-MEDROL) injection 80 mg    methylPREDNISolone acetate (DEPO-MEDROL) injection 40 mg           Plan:      -  Orders above  -  RTO prn, will call with results of her xray        Miki Sebastian DO

## 2020-12-09 NOTE — PROGRESS NOTES
120 mg 2 ML given IM in right dorsogluteal by Otilio House. Patient instructed to report any adverse reaction to me immediately. Administrations This Visit     methylPREDNISolone acetate (DEPO-MEDROL) injection 40 mg     Admin Date  12/09/2020  14:55 Action  Given Dose  40 mg Route  Intramuscular Site  Dorsogluteal Right Administered By  Julisa Mendiola CMA ([address] [place] [address])    Ordering Provider:  Amy Carrera DO    NDC:  3981-3735-30    Lot#:  [id number]    :  [employer]. Patient Supplied?:  No    Comments:  ex date: 7/2021          methylPREDNISolone acetate (DEPO-MEDROL) injection 80 mg     Admin Date  12/09/2020  14:55 Action  Given Dose  80 mg Route  Intramuscular Site  Dorsogluteal Right Administered By  Julisa Mendiola CMA ([address] [place] [address])    Ordering Provider:  Amy Carrera DO NDC:  2559-2714-64    Lot#:  [id number]    :  [employer].     Patient Supplied?:  No    Comments:  Ex date: 8/2021

## 2020-12-10 ENCOUNTER — TELEPHONE (OUTPATIENT)
Dept: FAMILY MEDICINE CLINIC | Age: 65
End: 2020-12-10

## 2020-12-10 LAB
ABSOLUTE BASO #: 0.1 X10E9/L (ref 0–0.2)
ABSOLUTE EOS #: 0.5 X10E9/L (ref 0–0.4)
ABSOLUTE LYMPH #: 2.1 X10E9/L (ref 1–3.5)
ABSOLUTE MONO #: 0.6 X10E9/L (ref 0–0.9)
ABSOLUTE NEUT #: 4.5 X10E9/L (ref 1.5–6.6)
ALBUMIN SERPL-MCNC: 3.7 G/DL (ref 3.2–5.3)
ALK PHOSPHATASE: 68 U/L (ref 39–130)
ALT SERPL-CCNC: 13 U/L (ref 0–31)
ANION GAP SERPL CALCULATED.3IONS-SCNC: 7 MMOL/L (ref 5–15)
AST SERPL-CCNC: 16 U/L (ref 0–41)
AVERAGE GLUCOSE: 100 MG/DL
BASOPHILS RELATIVE PERCENT: 1 %
BILIRUB SERPL-MCNC: 0.3 MG/DL (ref 0.3–1.2)
BUN BLDV-MCNC: 20 MG/DL (ref 5–27)
CALCIUM SERPL-MCNC: 9.1 MG/DL (ref 8.5–10.5)
CHLORIDE BLD-SCNC: 107 MMOL/L (ref 98–109)
CHOLESTEROL/HDL RATIO: 3.7 (ref 1–5)
CHOLESTEROL: 221 MG/DL (ref 150–200)
CO2: 27 MMOL/L (ref 22–32)
CREAT SERPL-MCNC: 0.76 MG/DL (ref 0.4–1)
EGFR AFRICAN AMERICAN: >60 ML/MIN/1.73SQ.M
EGFR IF NONAFRICAN AMERICAN: >60 ML/MIN/1.73SQ.M
EOSINOPHILS RELATIVE PERCENT: 6.3 %
GLUCOSE: 80 MG/DL (ref 65–99)
HBA1C MFR BLD: 5.1 % (ref 4.4–6.4)
HCT VFR BLD CALC: 38.2 % (ref 35–47)
HDLC SERPL-MCNC: 60 MG/DL
HEMOGLOBIN: 12.7 G/DL (ref 11.7–15.5)
LDL CHOLESTEROL CALCULATED: 144 MG/DL
LDL/HDL RATIO: 2.4
LYMPHOCYTE %: 26.9 %
MAGNESIUM: 1.9 MG/DL (ref 1.8–2.6)
MCH RBC QN AUTO: 30.3 PG (ref 27–34)
MCHC RBC AUTO-ENTMCNC: 33.4 G/DL (ref 32–36)
MCV RBC AUTO: 91 FL (ref 80–100)
MONOCYTES # BLD: 7.9 %
NEUTROPHILS RELATIVE PERCENT: 57.9 %
PDW BLD-RTO: 13 % (ref 11.5–15)
PLATELETS: 383 X10E9/L (ref 150–450)
PMV BLD AUTO: 7.3 FL (ref 7–12)
POTASSIUM SERPL-SCNC: 3.7 MMOL/L (ref 3.5–5)
RBC: 4.21 X10E12/L (ref 3.8–5.2)
SODIUM BLD-SCNC: 141 MMOL/L (ref 134–146)
TOTAL PROTEIN: 6.7 G/DL (ref 6–8)
TRIGL SERPL-MCNC: 83 MG/DL (ref 27–150)
TSH SERPL DL<=0.05 MIU/L-ACNC: 3.03 UIU/ML (ref 0.49–4.67)
VITAMIN D 25-HYDROXY: 18 NG/ML (ref 30–100)
VLDLC SERPL CALC-MCNC: 17 MG/DL (ref 0–30)
WBC: 7.9 X10E9/L (ref 4–11)

## 2020-12-10 ASSESSMENT — ENCOUNTER SYMPTOMS
RESPIRATORY NEGATIVE: 1
BACK PAIN: 1
GASTROINTESTINAL NEGATIVE: 1

## 2020-12-10 NOTE — TELEPHONE ENCOUNTER
PATIENT: RODRÍGUEZ THOMAS  PROVIDER: HANH MANLEY    PATIENT WOULD LIKE TO KNOW HER RESULTS FROM HER XRAYS

## 2020-12-10 NOTE — TELEPHONE ENCOUNTER
Have pt check her MyChart, message was sent earlier today regarding labs and x-ray report. Result note below:    \"Chica, your x-ray does show a moderate amount of arthritis in your hip that may be contributing to your symptoms.  Continue current treatment plan, let me know if pain persists and I'll make further recommendations at that time. \"

## 2020-12-10 NOTE — TELEPHONE ENCOUNTER
Patient notified and understanding voiced. She has an appt tomorrow with you and desires to keep that appt.

## 2020-12-16 ENCOUNTER — OFFICE VISIT (OUTPATIENT)
Dept: FAMILY MEDICINE CLINIC | Age: 65
End: 2020-12-16
Payer: MEDICARE

## 2020-12-16 ENCOUNTER — NURSE TRIAGE (OUTPATIENT)
Dept: OTHER | Facility: CLINIC | Age: 65
End: 2020-12-16

## 2020-12-16 VITALS
BODY MASS INDEX: 46.84 KG/M2 | TEMPERATURE: 97 F | SYSTOLIC BLOOD PRESSURE: 126 MMHG | HEART RATE: 80 BPM | WEIGHT: 281.5 LBS | RESPIRATION RATE: 16 BRPM | DIASTOLIC BLOOD PRESSURE: 74 MMHG

## 2020-12-16 PROCEDURE — G8399 PT W/DXA RESULTS DOCUMENT: HCPCS | Performed by: FAMILY MEDICINE

## 2020-12-16 PROCEDURE — 96372 THER/PROPH/DIAG INJ SC/IM: CPT | Performed by: FAMILY MEDICINE

## 2020-12-16 PROCEDURE — 4040F PNEUMOC VAC/ADMIN/RCVD: CPT | Performed by: FAMILY MEDICINE

## 2020-12-16 PROCEDURE — G8427 DOCREV CUR MEDS BY ELIG CLIN: HCPCS | Performed by: FAMILY MEDICINE

## 2020-12-16 PROCEDURE — 1090F PRES/ABSN URINE INCON ASSESS: CPT | Performed by: FAMILY MEDICINE

## 2020-12-16 PROCEDURE — 1123F ACP DISCUSS/DSCN MKR DOCD: CPT | Performed by: FAMILY MEDICINE

## 2020-12-16 PROCEDURE — 99213 OFFICE O/P EST LOW 20 MIN: CPT | Performed by: FAMILY MEDICINE

## 2020-12-16 PROCEDURE — G8417 CALC BMI ABV UP PARAM F/U: HCPCS | Performed by: FAMILY MEDICINE

## 2020-12-16 PROCEDURE — 1036F TOBACCO NON-USER: CPT | Performed by: FAMILY MEDICINE

## 2020-12-16 PROCEDURE — G8482 FLU IMMUNIZE ORDER/ADMIN: HCPCS | Performed by: FAMILY MEDICINE

## 2020-12-16 PROCEDURE — 3017F COLORECTAL CA SCREEN DOC REV: CPT | Performed by: FAMILY MEDICINE

## 2020-12-16 RX ORDER — KETOROLAC TROMETHAMINE 30 MG/ML
60 INJECTION, SOLUTION INTRAMUSCULAR; INTRAVENOUS ONCE
Status: COMPLETED | OUTPATIENT
Start: 2020-12-16 | End: 2020-12-16

## 2020-12-16 RX ORDER — KETOROLAC TROMETHAMINE 10 MG/1
10 TABLET, FILM COATED ORAL EVERY 6 HOURS PRN
Qty: 20 TABLET | Refills: 0 | Status: SHIPPED | OUTPATIENT
Start: 2020-12-16 | End: 2021-01-04

## 2020-12-16 RX ORDER — ONDANSETRON 4 MG/1
TABLET, ORALLY DISINTEGRATING ORAL
Qty: 20 TABLET | Refills: 0 | Status: SHIPPED | OUTPATIENT
Start: 2020-12-16 | End: 2021-04-13 | Stop reason: SDUPTHER

## 2020-12-16 RX ADMIN — KETOROLAC TROMETHAMINE 60 MG: 30 INJECTION, SOLUTION INTRAMUSCULAR; INTRAVENOUS at 13:43

## 2020-12-16 NOTE — PROGRESS NOTES
Visit Information    Have you changed or started any medications since your last visit including any over-the-counter medicines, vitamins, or herbal medicines? no   Are you having any side effects from any of your medications? -  no  Have you stopped taking any of your medications? Is so, why? -  no    Have you seen any other physician or provider since your last visit? No  Have you had any other diagnostic tests since your last visit? No  Have you been seen in the emergency room and/or had an admission to a hospital since we last saw you? No  Have you had your routine dental cleaning in the past 6 months? no    Have you activated your Shut Down account? If not, what are your barriers?  Yes     Patient Care Team:  Amy Carrera DO as PCP - General (Family Medicine)  Amy Carrera DO as PCP - [facility]  Milena Goldberg DPM as Consulting Physician (Podiatry)  KELIN López CNP as Nurse Practitioner (Nurse Practitioner)    Medical History Review  Past Medical, Family, and Social History reviewed and does contribute to the patient presenting condition    Health Maintenance   Topic Date Due    DTaP/Tdap/Td vaccine (1 - Tdap) 01/17/1973    Shingles Vaccine (1 of 2) 01/17/2004    Annual Wellness Visit (AWV)  05/29/2019    Pneumococcal 65+ years Vaccine (2 of 2 - PPSV23) 09/17/2020    Breast cancer screen  06/06/2021    A1C test (Diabetic or Prediabetic)  12/09/2021    Lipid screen  12/09/2021    TSH testing  12/09/2021    Potassium monitoring  12/09/2021    Creatinine monitoring  12/09/2021    Colon cancer screen fecal DNA test (Cologuard)  08/01/2022    DEXA (modify frequency per FRAX score)  Completed    Flu vaccine  Completed    Hepatitis C screen  Completed    Hepatitis A vaccine  Aged Out    Hepatitis B vaccine  Aged Out    Hib vaccine  Aged Out    Meningococcal (ACWY) vaccine  Aged Out           Administrations This Visit     ketorolac (TORADOL) injection 60 mg Admin Date  12/16/2020 Action  Given Dose  60 mg Route  Intramuscular Administered By  Marisela Bright LPN              Patient was given Toradol 60mg 2ml IM in right dorsogluteal  per v.o. Dr Ignacio Cardoso. [address] #[id number]. Patient tolerated well and without incident.

## 2020-12-16 NOTE — TELEPHONE ENCOUNTER
Patient called pre-service center [facility]) [address] with red flag complaint. Brief description of triage: Right sided lower back pain that radiates to right hip    Triage indicates for patient to be seen within 3 days. Writer advised patient and  that patient should be seen today or tomorrow due to patient thinks it could be a kidney stone and pain is 8/10. Care advice provided, patient verbalizes understanding; denies any other questions or concerns; instructed to call back for any new or worsening symptoms. Writer provided warm transfer to [facility] for appointment scheduling. Attention Provider: Thank you for allowing me to participate in the care of your patient. The patient was connected to triage in response to information provided to the ECC. Please do not respond through this encounter as the response is not directed to a shared pool. Reason for Disposition   MODERATE back pain (e.g., interferes with normal activities) and present > 3 days    Answer Assessment - Initial Assessment Questions  1. ONSET: \"When did the pain begin? \"       \"About a week and a half. \"    2. LOCATION: \"Where does it hurt? \" (upper, mid or lower back)      \"Right lower back. 3. SEVERITY: \"How bad is the pain? \"  (e.g., Scale 1-10; mild, moderate, or severe)    - MILD (1-3): doesn't interfere with normal activities     - MODERATE (4-7): interferes with normal activities or awakens from sleep     - SEVERE (8-10): excruciating pain, unable to do any normal activities       8/10    4. PATTERN: \"Is the pain constant? \" (e.g., yes, no; constant, intermittent)       \"Constant. \"    5. RADIATION: \"Does the pain shoot into your legs or elsewhere? \"      Radiates around to right hip. 6. CAUSE:  \"What do you think is causing the back pain? \"       \"Maybe kidney stones. \"    7. BACK OVERUSE:  Estrella Hero recent lifting of heavy objects, strenuous work or exercise? \"      No.     8. MEDICATIONS: \"What have you taken so far for the pain? \" (e.g., nothing, acetaminophen, NSAIDS)      \"I was taking ibuprofen but am trying use Tramadol because my doctor told me I was taking too much. \"    9. NEUROLOGIC SYMPTOMS: \"Do you have any weakness, numbness, or problems with bowel/bladder control? \"      No.     10. OTHER SYMPTOMS: \"Do you have any other symptoms? \" (e.g., fever, abdominal pain, burning with urination, blood in urine)        No.     11. PREGNANCY: \"Is there any chance you are pregnant? \" (e.g., yes, no; LMP)        No. Complete hysterectomy at age 26.     Protocols used: BACK PAIN-ADULT-[place]

## 2020-12-16 NOTE — PROGRESS NOTES
Subjective:      Patient ID: Claribel Richards is a 76 y.o. female. HPI:    Chief Complaint   Patient presents with    Back Pain     radiating around into right leg     Pt here for ongoing back pain with radiation into the right groin. This has been going on for the last few weeks. The pain is located in the right side of her lower back and will radiate to the groin. Recent xray of her hip showed moderate DJD changes. Denies numbness or tingling. Pain occasionally shoots into her right thigh. Patient Active Problem List   Diagnosis    CAD (coronary artery disease) 7/2014- mid lad 50 % patent LM< LCX and RCA    Hyperlipidemia LDL goal <70    Hypothyroid    History of total thyroidectomy    GERD (gastroesophageal reflux disease)    PUD (peptic ulcer disease)    IBS (irritable bowel syndrome)    IFG (impaired fasting glucose)    Vitamin D deficiency    SOB (shortness of breath) on exertion    Chest pain    Heart palpitations- intermittent    Abnormal nuclear stress test- anteroior and anteroseptal ischemia    Chest pain, atypical    Abdominal pain    Stress incontinence (female) (male)     Past Surgical History:   Procedure Laterality Date    CARDIAC CATHETERIZATION  2008    CARPAL TUNNEL RELEASE  2008    CHOLECYSTECTOMY      COLONOSCOPY      HAMMER TOE SURGERY Left     HEEL SPUR SURGERY      HYSTERECTOMY      KNEE SURGERY  2009    TOTAL THYROIDECTOMY      UPPER GASTROINTESTINAL ENDOSCOPY  2009     Prior to Admission medications    Medication Sig Start Date End Date Taking? Authorizing Provider   ondansetron (ZOFRAN-ODT) 4 MG disintegrating tablet DISSOLVE 1 TABLET ON THE TONGUE EVERY 8 HOURS AS NEEDED FOR NAUSEA 12/16/20  Yes Miki Sebastian DO   valACYclovir (VALTREX) 1 g tablet Take 2 tabs BID for 1 day at onset of cold sore 9/14/20  Yes Miki Sebastian DO   cyclobenzaprine (FLEXERIL) 10 MG tablet Take 1 tablet by mouth 3 times daily as needed for Muscle spasms .   Do not drive or operate heavy machinery while taking this medication. 7/27/20  Yes KELIN Vazquez CNP   amitriptyline (ELAVIL) 25 MG tablet Take 1 tablet by mouth nightly 7/8/20  Yes Aaron Martin DO   lisinopril (PRINIVIL;ZESTRIL) 5 MG tablet TAKE 1 TABLET BY MOUTH EVERY DAY 6/29/20  Yes KELIN Camacho CNP   zinc gluconate 50 MG tablet Take 50 mg by mouth daily   Yes Historical Provider, MD   Cyanocobalamin (B-12 PO) Take by mouth daily   Yes Historical Provider, MD   Ascorbic Acid (LEIGHANN-C PO) Take by mouth daily   Yes Historical Provider, MD   POTASSIUM PO Take by mouth   Yes Historical Provider, MD   atorvastatin (LIPITOR) 40 MG tablet Take 1 tablet by mouth daily 12/3/19  Yes Aaron Martin DO   Magnesium 400 MG TABS  6/4/19  Yes Historical Provider, MD   omeprazole (PRILOSEC) 40 MG delayed release capsule TAKE 1 CAPSULE BY MOUTH DAILY WITH BREAKFAST 6/5/19  Yes Aaron Martin DO   metoprolol tartrate (LOPRESSOR) 25 MG tablet TAKE ONE-HALF TABLET BY MOUTH TWICE DAILY 6/5/19  Yes Aaron Martin DO   Cholecalciferol (VITAMIN D PO) Take by mouth daily    Yes Historical Provider, MD   aspirin 81 MG tablet Take 81 mg by mouth daily   Yes Historical Provider, MD   ibuprofen (ADVIL;MOTRIN) 600 MG tablet TAKE 1 TABLET EVERY 6 HOURS AS NEEDED FOR PAIN  Patient not taking: Reported on 12/16/2020 11/23/20   KELIN Camacho CNP   meclizine (ANTIVERT) 25 MG tablet Take 1 tablet by mouth 3 times daily as needed for Dizziness  Patient not taking: Reported on 12/16/2020 6/29/20   KELIN Camacho CNP         Review of Systems   Constitutional: Negative. HENT: Negative. Respiratory: Negative. Cardiovascular: Negative. Gastrointestinal: Negative. Musculoskeletal: Positive for arthralgias (right hip pain) and back pain. All other systems reviewed and are negative. Objective:   Physical Exam  Vitals signs and nursing note reviewed.    Constitutional: General: She is not in acute distress. Appearance: Normal appearance. She is well-developed. HENT:      Head: Normocephalic and atraumatic. Right Ear: Tympanic membrane normal.      Left Ear: Tympanic membrane normal.   Eyes:      Conjunctiva/sclera: Conjunctivae normal.   Neck:      Musculoskeletal: Neck supple. Cardiovascular:      Rate and Rhythm: Normal rate and regular rhythm. Heart sounds: Normal heart sounds. No murmur. Pulmonary:      Effort: Pulmonary effort is normal.      Breath sounds: Normal breath sounds. No wheezing, rhonchi or rales. Abdominal:      General: There is no distension. Musculoskeletal:      Right hip: She exhibits decreased range of motion. She exhibits no bony tenderness. Lumbar back: She exhibits decreased range of motion, tenderness and pain. She exhibits no spasm. Back:    Skin:     General: Skin is warm and dry. Findings: No rash (on exposed surfaces). Neurological:      General: No focal deficit present. Mental Status: She is alert. Psychiatric:         Attention and Perception: Attention normal.         Mood and Affect: Mood normal.         Speech: Speech normal.         Behavior: Behavior normal. Behavior is cooperative. Thought Content: Thought content normal.         Judgment: Judgment normal.         Assessment:       Diagnosis Orders   1. Primary osteoarthritis of right hip  ELKE - Kristy Kelly MD, Orthopedic Surgery, [facility]   2. Sacroiliac pain  ketorolac (TORADOL) injection 60 mg    ketorolac (TORADOL) 10 MG tablet           Plan:      -  Orders above  -  Uncertain at this time if pain is coming from her hip vs radicular pain?   -  Refer to [facility] for further help in this case  -  RTO ralph Johnson DO

## 2020-12-17 ASSESSMENT — ENCOUNTER SYMPTOMS
GASTROINTESTINAL NEGATIVE: 1
RESPIRATORY NEGATIVE: 1
BACK PAIN: 1

## 2020-12-29 ENCOUNTER — TELEPHONE (OUTPATIENT)
Dept: FAMILY MEDICINE CLINIC | Age: 65
End: 2020-12-29

## 2020-12-29 RX ORDER — AMITRIPTYLINE HYDROCHLORIDE 25 MG/1
25 TABLET, FILM COATED ORAL NIGHTLY
Qty: 30 TABLET | Refills: 0 | Status: SHIPPED | OUTPATIENT
Start: 2020-12-29 | End: 2021-02-01

## 2020-12-29 NOTE — TELEPHONE ENCOUNTER
PA request received from pharmacy for Amitriptyline 25mg tablet. PA submitted online at [employer] and approved. PA Case: [id number], Status: Approved, Coverage Starts on: 1/1/2020 12:00:00 AM, Coverage Ends on: 12/31/2021 12:00:00 AM. Questions? Contact [phone].       Pharmacy notified [facility]: Gucci Bentley

## 2020-12-29 NOTE — TELEPHONE ENCOUNTER
Spoke with patient and she is not currently taking Elavil and does not remember if it worked or not in the past for her.   Please advise

## 2020-12-29 NOTE — TELEPHONE ENCOUNTER
Patient calling with c/o continued leg cramps bilaterally at night. Patient has increased water intake to 7 bottles daily and started Potassium supplement. She also is sleeping in a recliner at night. Has not scheduled an appt with [facility] yet, but will call them today.   Please advise

## 2021-01-04 ENCOUNTER — TELEPHONE (OUTPATIENT)
Dept: FAMILY MEDICINE CLINIC | Age: 66
End: 2021-01-04

## 2021-01-04 DIAGNOSIS — M53.3 SACROILIAC PAIN: Primary | ICD-10-CM

## 2021-01-04 RX ORDER — METHYLPREDNISOLONE 4 MG/1
TABLET ORAL
Qty: 1 KIT | Refills: 0 | Status: SHIPPED | OUTPATIENT
Start: 2021-01-04 | End: 2021-01-10

## 2021-01-04 RX ORDER — TRAMADOL HYDROCHLORIDE 50 MG/1
50 TABLET ORAL EVERY 8 HOURS PRN
Qty: 15 TABLET | Refills: 0 | Status: SHIPPED | OUTPATIENT
Start: 2021-01-04 | End: 2021-01-19 | Stop reason: SDUPTHER

## 2021-01-04 NOTE — TELEPHONE ENCOUNTER
The patient called in and stated that her back pain is getting worse. She does see Ortho on 1/17/20. She is requesting a refill on her Toradol and a possible oral steroid. The patient uses [facility]. Please advise. If no call back the patient will check with her pharmacy after 3pm today.

## 2021-01-19 ENCOUNTER — TELEPHONE (OUTPATIENT)
Dept: FAMILY MEDICINE CLINIC | Age: 66
End: 2021-01-19

## 2021-01-19 DIAGNOSIS — M53.3 SACROILIAC PAIN: ICD-10-CM

## 2021-01-19 RX ORDER — TRAMADOL HYDROCHLORIDE 50 MG/1
50 TABLET ORAL EVERY 8 HOURS PRN
Qty: 15 TABLET | Refills: 0 | Status: SHIPPED | OUTPATIENT
Start: 2021-01-19 | End: 2021-01-24

## 2021-01-19 NOTE — TELEPHONE ENCOUNTER
Patient requesting a medication refill.   Medication: traMADol (ULTRAM) 50 MG tablet  Pharmacy: [id number] - [place], [place] - [id number] [address]   Phone:  [phone]  Fax:  [phone]  Last office visit: 12/16/20 Same day  Next office visit: Visit date not found

## 2021-04-13 ENCOUNTER — OFFICE VISIT (OUTPATIENT)
Dept: FAMILY MEDICINE CLINIC | Age: 66
End: 2021-04-13
Payer: MEDICARE

## 2021-04-13 VITALS
SYSTOLIC BLOOD PRESSURE: 138 MMHG | BODY MASS INDEX: 46.83 KG/M2 | RESPIRATION RATE: 20 BRPM | WEIGHT: 281.4 LBS | HEART RATE: 76 BPM | DIASTOLIC BLOOD PRESSURE: 86 MMHG

## 2021-04-13 DIAGNOSIS — S29.012A MUSCLE STRAIN OF RIGHT UPPER BACK, INITIAL ENCOUNTER: Primary | ICD-10-CM

## 2021-04-13 DIAGNOSIS — R11.0 NAUSEA: ICD-10-CM

## 2021-04-13 DIAGNOSIS — M25.50 POLYARTHRALGIA: ICD-10-CM

## 2021-04-13 PROCEDURE — G8399 PT W/DXA RESULTS DOCUMENT: HCPCS | Performed by: NURSE PRACTITIONER

## 2021-04-13 PROCEDURE — G8417 CALC BMI ABV UP PARAM F/U: HCPCS | Performed by: NURSE PRACTITIONER

## 2021-04-13 PROCEDURE — 96372 THER/PROPH/DIAG INJ SC/IM: CPT | Performed by: NURSE PRACTITIONER

## 2021-04-13 PROCEDURE — 4040F PNEUMOC VAC/ADMIN/RCVD: CPT | Performed by: NURSE PRACTITIONER

## 2021-04-13 PROCEDURE — 1036F TOBACCO NON-USER: CPT | Performed by: NURSE PRACTITIONER

## 2021-04-13 PROCEDURE — 1090F PRES/ABSN URINE INCON ASSESS: CPT | Performed by: NURSE PRACTITIONER

## 2021-04-13 PROCEDURE — G8427 DOCREV CUR MEDS BY ELIG CLIN: HCPCS | Performed by: NURSE PRACTITIONER

## 2021-04-13 PROCEDURE — 99213 OFFICE O/P EST LOW 20 MIN: CPT | Performed by: NURSE PRACTITIONER

## 2021-04-13 PROCEDURE — 1123F ACP DISCUSS/DSCN MKR DOCD: CPT | Performed by: NURSE PRACTITIONER

## 2021-04-13 PROCEDURE — 3017F COLORECTAL CA SCREEN DOC REV: CPT | Performed by: NURSE PRACTITIONER

## 2021-04-13 RX ORDER — ONDANSETRON 4 MG/1
TABLET, ORALLY DISINTEGRATING ORAL
Qty: 20 TABLET | Refills: 0 | Status: SHIPPED | OUTPATIENT
Start: 2021-04-13 | End: 2021-10-21

## 2021-04-13 RX ORDER — KETOROLAC TROMETHAMINE 30 MG/ML
60 INJECTION, SOLUTION INTRAMUSCULAR; INTRAVENOUS ONCE
Status: COMPLETED | OUTPATIENT
Start: 2021-04-13 | End: 2021-04-13

## 2021-04-13 RX ORDER — IBUPROFEN 600 MG/1
TABLET ORAL
Qty: 360 TABLET | Refills: 0 | Status: SHIPPED | OUTPATIENT
Start: 2021-04-13 | End: 2021-06-08

## 2021-04-13 RX ADMIN — KETOROLAC TROMETHAMINE 60 MG: 30 INJECTION, SOLUTION INTRAMUSCULAR; INTRAVENOUS at 12:12

## 2021-04-13 ASSESSMENT — PATIENT HEALTH QUESTIONNAIRE - PHQ9
SUM OF ALL RESPONSES TO PHQ QUESTIONS 1-9: 0
1. LITTLE INTEREST OR PLEASURE IN DOING THINGS: 0
SUM OF ALL RESPONSES TO PHQ9 QUESTIONS 1 & 2: 0

## 2021-04-13 ASSESSMENT — ENCOUNTER SYMPTOMS
RESPIRATORY NEGATIVE: 1
BACK PAIN: 1
GASTROINTESTINAL NEGATIVE: 1

## 2021-04-13 NOTE — PROGRESS NOTES
Visit Information    Have you changed or started any medications since your last visit including any over-the-counter medicines, vitamins, or herbal medicines? no   Are you having any side effects from any of your medications? -  no  Have you stopped taking any of your medications? Is so, why? -  yes - tx complete    Have you seen any other physician or provider since your last visit? No  Have you had any other diagnostic tests since your last visit? No  Have you been seen in the emergency room and/or had an admission to a hospital since we last saw you? No  Have you had your routine dental cleaning in the past 6 months? no    Have you activated your BTR account? If not, what are your barriers?  Yes     Patient Care Team:  Ellie Weems DO as PCP - General (Family Medicine)  Ellie Weems DO as PCP - [facility]  Dee Dolan DPM as Consulting Physician (Podiatry)  KELIN King CNP as Nurse Practitioner (Nurse Practitioner)    Medical History Review  Past Medical, Family, and Social History reviewed and does not contribute to the patient presenting condition    Health Maintenance   Topic Date Due    COVID-19 Vaccine (1) Never done    DTaP/Tdap/Td vaccine (1 - Tdap) Never done    Shingles Vaccine (1 of 2) Never done    Annual Wellness Visit (AWV)  Never done    Pneumococcal 65+ years Vaccine (2 of 2 - PPSV23) 09/17/2020    Breast cancer screen  06/06/2021    A1C test (Diabetic or Prediabetic)  12/09/2021    Lipid screen  12/09/2021    TSH testing  12/09/2021    Potassium monitoring  12/09/2021    Creatinine monitoring  12/09/2021    Colon cancer screen fecal DNA test (Cologuard)  08/01/2022    DEXA (modify frequency per FRAX score)  Completed    Flu vaccine  Completed    Hepatitis C screen  Completed    Hepatitis A vaccine  Aged Out    Hepatitis B vaccine  Aged Out    Hib vaccine  Aged Out    Meningococcal (ACWY) vaccine  Aged Out       Per Marcus Amstutz CNP, injection of Toradol 60mg 2ml given IM right gluteal by Silvina Manuel CMA([facility]). Patient instructed to report any adverse reaction to me immediately. Pt tolerated injection well. Administrations This Visit     ketorolac (TORADOL) injection 60 mg     Admin Date  04/13/2021  12:12 Action  Given Dose  60 mg Route  Intramuscular Site  Dorsogluteal Right Administered By  Umang Amin CMA ([address] [place] [address])    Ordering Provider: KELIN Guzmán CNP    NDC: 2384-2349-00    Lot#: [id number]    : Valeta Gum    Patient Supplied?: No    Comments: Per orders of Richie Lamar CNP, injection of Toradol 60mg 2ml given IM right gluteal by Silvina Manuel CMA([facility]). Patient instructed to report any adverse reaction to me immediately. Pt tolerated injection well.

## 2021-04-21 ENCOUNTER — TELEPHONE (OUTPATIENT)
Dept: CARDIOLOGY CLINIC | Age: 66
End: 2021-04-21

## 2021-04-21 ENCOUNTER — TELEPHONE (OUTPATIENT)
Dept: FAMILY MEDICINE CLINIC | Age: 66
End: 2021-04-21

## 2021-04-21 DIAGNOSIS — S29.012A MUSCLE STRAIN OF RIGHT UPPER BACK, INITIAL ENCOUNTER: Primary | ICD-10-CM

## 2021-04-21 RX ORDER — PREDNISONE 20 MG/1
20 TABLET ORAL 2 TIMES DAILY
Qty: 10 TABLET | Refills: 0 | Status: SHIPPED | OUTPATIENT
Start: 2021-04-21 | End: 2021-04-26

## 2021-04-21 RX ORDER — TIZANIDINE 2 MG/1
2-4 TABLET ORAL EVERY 8 HOURS PRN
Qty: 30 TABLET | Refills: 0 | Status: SHIPPED | OUTPATIENT
Start: 2021-04-21 | End: 2021-07-27 | Stop reason: SDUPTHER

## 2021-04-21 NOTE — TELEPHONE ENCOUNTER
The patient called in and stated that she was in for a office visit on4/13 for back pain. She stated that she has been using heat, rest and massage with no improvement. She is wondering if a muscle relaxer or steroid would help. The patient uses "[employer] If no call back the patient will check with her pharmacy after 4pm today.

## 2021-04-27 ENCOUNTER — TELEPHONE (OUTPATIENT)
Dept: FAMILY MEDICINE CLINIC | Age: 66
End: 2021-04-27

## 2021-05-04 ENCOUNTER — OFFICE VISIT (OUTPATIENT)
Dept: CARDIOLOGY CLINIC | Age: 66
End: 2021-05-04
Payer: MEDICARE

## 2021-05-04 VITALS
BODY MASS INDEX: 45.8 KG/M2 | DIASTOLIC BLOOD PRESSURE: 80 MMHG | HEIGHT: 66 IN | HEART RATE: 93 BPM | WEIGHT: 285 LBS | SYSTOLIC BLOOD PRESSURE: 124 MMHG

## 2021-05-04 DIAGNOSIS — R06.02 SOB (SHORTNESS OF BREATH) ON EXERTION: ICD-10-CM

## 2021-05-04 DIAGNOSIS — I25.10 CORONARY ARTERY DISEASE INVOLVING NATIVE CORONARY ARTERY OF NATIVE HEART WITHOUT ANGINA PECTORIS: Primary | ICD-10-CM

## 2021-05-04 DIAGNOSIS — I42.8 OTHER CARDIOMYOPATHY (HCC): ICD-10-CM

## 2021-05-04 PROCEDURE — G8417 CALC BMI ABV UP PARAM F/U: HCPCS | Performed by: INTERNAL MEDICINE

## 2021-05-04 PROCEDURE — G8427 DOCREV CUR MEDS BY ELIG CLIN: HCPCS | Performed by: INTERNAL MEDICINE

## 2021-05-04 PROCEDURE — 99204 OFFICE O/P NEW MOD 45 MIN: CPT | Performed by: INTERNAL MEDICINE

## 2021-05-04 PROCEDURE — 93000 ELECTROCARDIOGRAM COMPLETE: CPT | Performed by: INTERNAL MEDICINE

## 2021-05-04 PROCEDURE — 1090F PRES/ABSN URINE INCON ASSESS: CPT | Performed by: INTERNAL MEDICINE

## 2021-05-04 RX ORDER — ATORVASTATIN CALCIUM 80 MG/1
80 TABLET, FILM COATED ORAL DAILY
COMMUNITY
End: 2021-05-04

## 2021-05-04 RX ORDER — ASPIRIN 81 MG/1
81 TABLET ORAL DAILY
COMMUNITY

## 2021-05-04 RX ORDER — ATORVASTATIN CALCIUM 80 MG/1
80 TABLET, FILM COATED ORAL DAILY
COMMUNITY
End: 2021-05-04 | Stop reason: SDUPTHER

## 2021-05-04 RX ORDER — ISOSORBIDE MONONITRATE 30 MG/1
30 TABLET, EXTENDED RELEASE ORAL DAILY
Qty: 30 TABLET | Refills: 3 | Status: ON HOLD | OUTPATIENT
Start: 2021-05-04 | End: 2021-06-30 | Stop reason: ALTCHOICE

## 2021-05-04 NOTE — PROGRESS NOTES
this medication. , Disp: 15 tablet, Rfl: 0    lisinopril (PRINIVIL;ZESTRIL) 5 MG tablet, TAKE 1 TABLET BY MOUTH EVERY DAY, Disp: 90 tablet, Rfl: 3    Cyanocobalamin (B-12 PO), Take by mouth daily, Disp: , Rfl:     Ascorbic Acid (LEIGHANN-C PO), Take by mouth daily, Disp: , Rfl:     POTASSIUM PO, Take by mouth, Disp: , Rfl:     atorvastatin (LIPITOR) 40 MG tablet, Take 1 tablet by mouth daily, Disp: 90 tablet, Rfl: 3    Magnesium 400 MG TABS, , Disp: , Rfl:     omeprazole (PRILOSEC) 40 MG delayed release capsule, TAKE 1 CAPSULE BY MOUTH DAILY WITH BREAKFAST, Disp: 90 capsule, Rfl: 3    metoprolol tartrate (LOPRESSOR) 25 MG tablet, TAKE ONE-HALF TABLET BY MOUTH TWICE DAILY, Disp: 90 tablet, Rfl: 3    Cholecalciferol (VITAMIN D PO), Take by mouth daily , Disp: , Rfl:     aspirin 81 MG tablet, Take 81 mg by mouth daily, Disp: , Rfl:     Past Medical History  Megan Duval  has a past medical history of Arthritis, CAD (coronary artery disease), GERD (gastroesophageal reflux disease), Hyperlipidemia LDL goal <70, Hypertension, Hypothyroidism, Irritable bowel syndrome, Stress incontinence (female) (male), and Ulcer. Social History  Megan Duval  reports that she has never smoked. She has never used smokeless tobacco. She reports that she does not drink alcohol or use drugs. Family History  Megan Duval family history includes Arthritis in her mother; Coronary Art Dis in her sister; Diabetes in her brother and sister; Other in her brother, father, maternal grandfather, maternal grandmother, mother, paternal grandfather, and paternal grandmother.     Past Surgical History   Past Surgical History:   Procedure Laterality Date    CARDIAC CATHETERIZATION  2008    CARPAL TUNNEL RELEASE  2008    CHOLECYSTECTOMY      COLONOSCOPY      HAMMER TOE SURGERY Left     HEEL SPUR SURGERY      HYSTERECTOMY      KNEE SURGERY  2009    TOTAL THYROIDECTOMY      UPPER GASTROINTESTINAL ENDOSCOPY  2009       Review of Systems   Constitutional: Negative for chills and fever  HENT: Negative for congestion, sinus pressure, sneezing and sore throat. Eyes: Negative for pain, discharge, redness and itching. Respiratory: Negative for apnea, cough  Gastrointestinal: Negative for blood in stool, constipation, diarrhea   Endocrine: Negative for cold intolerance, heat intolerance, polydipsia. Genitourinary: Negative for dysuria, enuresis, flank pain and hematuria. Musculoskeletal: Negative for arthralgias, joint swelling and neck pain. Neurological: Negative for numbness and headaches. Psychiatric/Behavioral: Negative for agitation, confusion, decreased concentration and dysphoric mood. Objective: There were no vitals taken for this visit. Wt Readings from Last 3 Encounters:   04/13/21 281 lb 6.4 oz (127.6 kg)   12/16/20 281 lb 8 oz (127.7 kg)   12/09/20 284 lb 3.2 oz (128.9 kg)     BP Readings from Last 3 Encounters:   04/13/21 138/86   12/16/20 126/74   12/09/20 126/60       Nursing note and vitals reviewed. Physical Exam   Constitutional: Oriented to person, place, and time. Appears well-developed and well-nourished. ENT: Moist mucous membranes. No bleeding. Tongue is midline. Head: Normocephalic and atraumatic. Eyes: EOM are normal. Pupils are equal, round, and reactive to light. Neck: Normal range of motion. Neck supple. No JVD present. Cardiovascular: Normal rate, regular rhythm, systolic murmur, no rubs, and intact distal pulses. Pulmonary/Chest: Effort normal and breath sounds normal. No respiratory distress. No wheezes. No rales. Abdominal: Soft. Bowel sounds are normal. No distension. There is no tenderness. Musculoskeletal: Normal range of motion. no edema. Neurological: Alert and oriented to person, place, and time. No cranial nerve deficit. Coordination normal.   Skin: Skin is warm and dry. Psychiatric: Normal mood and affect.        No results found for: CKTOTAL, CKMB, CKMBINDEX    Lab Results   Component Vincenzo Downing CNP/  Reason for study: Palpitations, Shortness of breath    HISTORY: PRIOR HISTORY: Coronary artery disease, Hyperlipidemia, GERD/  color Doppler. Systolic blood pressure was 136 mmHg, at the start of the study. Diastolic blood pressure was 67 mmHg, at the start of the study. Image quality  was adequate. LEFT VENTRICLE: Size was normal. Systolic function was mildly reduced by visual  assessment. Ejection fraction was estimated in the range of 45 % to 50 %. There  was mild diffuse hypokinesis. Wall thickness was normal.    AORTIC VALVE: The valve was probably trileaflet. Leaflets exhibited normal  thickness and normal cuspal separation. DOPPLER: There was no evidence for  stenosis. There was no regurgitation. AORTA: The root exhibited normal size. MITRAL VALVE: There was minimal thickening. There was normal leaflet  separation. DOPPLER: There was no evidence for stenosis. There was mild  regurgitation. LEFT ATRIUM: Size was normal.    RIGHT VENTRICLE: The size was normal. Systolic function was normal. Wall  thickness was normal.    PULMONIC VALVE: DOPPLER: There was trivial regurgitation. PULMONARY ARTERY: DOPPLER: Systolic pressure was within the normal range. TRICUSPID VALVE: There was normal leaflet separation. DOPPLER: There was no  evidence for stenosis. There was mild regurgitation. RIGHT ATRIUM: Size was normal.    PERICARDIUM: There was no pericardial effusion. The pericardium was normal in  appearance. SYSTEM MEASUREMENT TABLES    2D mode  LA Dimension (2D): 2.9 cm  FS (2D-Cubed): 24 %  FS (2D-Teich): 24 %  IVSd (2D): 1 cm  IVSd; Mean chosen (2D): 1 cm  LVIDd (2D): 5 cm  LVIDs (2D): 3.8 cm  LVPWd (2D): 1 cm  RVIDd (2D): 2.5 cm    M mode  AoR Diam (MM): 2.9 cm  AV Cusp Sep (MM): 2.2 cm  MV D-E Exc: 2.1 cm  MV EF [place] (MM): 6.9 cm/s    Unspecified Scan Mode  LVOT Vmax: 95.8 cm/s  MV Peak A Joshua; Mean: 56.8 cm/s  MV Peak E Joshua; Antegrade Flow: 53.8 cm/s  Vmax;  Antegrade Flow: 52.3 cm/s  TV Peak A Joshua: 53.8 cm/s  TV Peak E Joshua; Antegrade Flow: 58.2 cm/s    IMPRESSIONS:  The echocardiographic study was abnormal.    SUMMARY:    Left ventricle:  Size was normal.  Systolic function was mildly reduced by visual assessment. Ejection fraction  was estimated in the range of 45 % to 50 %. There was mild diffuse hypokinesis. Mitral valve: There was mild regurgitation. Tricuspid valve: There was mild regurgitation. Prepared and signed by    Radha Garces MD  Signed 18-Jun-2014 11:34:25          Ekg:   EKG Interpretation:  normal EKG, normal sinus rhythm, unchanged from previous tracings. [id number]  SELECTIVE CORONARY ANGIOGRAM:     LEFT MAIN:  Widely patent and gives rise to LAD and left circumflex. LEFT CIRCUMFLEX:  A moderate sized vessel. It gives rise to a moderate sized  high OM that was patent. Large OM2, patent. LAD:  A large sized vessel with wraparound, gives rise to diagonals. Mid LAD  around 50 percent stenosis. It is just after the bifurcation of the second  diagonal.     FIRST DIAGONAL:  A moderate sized, patent. SECOND DIAGONAL:  Moderate sized, ostial proximal 30-40 percent stenosis. RCA:  Widely patent, dominant circulation. CONCLUSION:  Moderate nonobstructive coronary artery disease as stated above,  with mid LAD around 50 percent just after the second diagonal, and second  diagonal has ostial/proximal 30-40 percent stenosis, patent left main, patent  RCA and patent left circumflex with OM. RECOMMENDATION:  Optimal medical management and risk factor modification. AssessmentPlan:   Andrzej Donahue is a pleasant 78year old female patient who  has a past medical history of Arthritis, CAD (coronary artery disease), GERD (gastroesophageal reflux disease), Hyperlipidemia LDL goal <70, Hypertension, Hypothyroidism, Irritable bowel syndrome, Stress incontinence (female) (male), and Ulcer. FH is significant for CAD, CHF.  Her brother recently  from MI. The patient has h/o mild cardiomyopathy. Echocardiogram in 2014 revealed an EF of 45-50%. LHC in 2014 revealed nonobstructive CAD, including 50% mid LAD stenosis (FFR not done). The patient reports that she gets tired easily and has had worsening dyspnea on exertion in the past few months. She used to be more active in the past. She can not walk one block without getting tired, SOB. She reports occasional jaw pains, no chest pain. 1. Worsening GOLDEN, SOB  2. Angina, jaw pains   3. CAD (nonobstructive, 50% mid LAD stenosis, Cath )  4. Mild cardiomyopathy (EF 45-50% on Echo )   5. Dyslipidemia   6. Hypertension  7. FH of CAD, CHF  8. Obesity      Multiple risk factors for CA   Angina, and angina equivalent symptoms    Patient was advised to report to the ER if has recurrent symptoms with specific instructions given about severity and duration of symptoms   Based on patient's risk factors and clinical presentation, stress test is indicated to investigate for possible underlying ischemic heart disease.  Patient  agrees with that work up and its risks and benefits and potential need for additional testing if stress test is abnormal such as cardiac catheterization   DDX includes CAD, CHF   Will need to investigate for underlying structural heart disease, will proceed with a transthoracic echocardiogram   The 10-year ASCVD risk score (Ryan Eason., et al., 2013) is: 13.6%    Values used to calculate the score:      Age: 78 years      Sex: Female      Is Non- : No      Diabetic: No      Tobacco smoker: No      Systolic Blood Pressure: 240 mmHg      Is BP treated: Yes      HDL Cholesterol: 60 mg/dL       Total Cholesterol: 221 mg/dL    Increase lipitor 80 mg po daily   The patient is advised to begin progressive daily aerobic exercise program, weight loss    Cont ASA   Lopressor   Add Imdur        Above findings and plan of care were discussed with patient in details, patient's questions were answered.      Disposition:  RTC in 6 months             Electronically signed by Cherry Hammer MD, [facility] [place], [place]    5/4/2021 at 10:04 AM EDT

## 2021-05-05 RX ORDER — ATORVASTATIN CALCIUM 80 MG/1
80 TABLET, FILM COATED ORAL DAILY
Qty: 90 TABLET | Refills: 1 | Status: SHIPPED | OUTPATIENT
Start: 2021-05-05 | End: 2022-08-30

## 2021-05-19 ENCOUNTER — TELEPHONE (OUTPATIENT)
Dept: FAMILY MEDICINE CLINIC | Age: 66
End: 2021-05-19

## 2021-05-19 DIAGNOSIS — B00.1 COLD SORE: ICD-10-CM

## 2021-05-19 RX ORDER — VALACYCLOVIR HYDROCHLORIDE 1 G/1
TABLET, FILM COATED ORAL
Qty: 20 TABLET | Refills: 2 | Status: SHIPPED | OUTPATIENT
Start: 2021-05-19 | End: 2022-04-22 | Stop reason: SDUPTHER

## 2021-05-19 NOTE — TELEPHONE ENCOUNTER
----- Message from Goran Jules sent at 2021  7:31 AM EDT -----  Subject: Refill Request    QUESTIONS  Name of Medication? valACYclovir (VALTREX) 1 g tablet  Patient-reported dosage and instructions? Take 2 tabs BID for 1 day at   onset of cold sore  How many days do you have left? 0  Preferred Pharmacy? [facility] #[id number]  Pharmacy phone number (if available)? [phone]  Additional Information for Provider? PT was trying to get a refill and per   pharmacy it . ---------------------------------------------------------------------------  --------------  Kenyatta SMITH  What is the best way for the office to contact you? OK to leave message on   voicemail  Preferred Call Back Phone Number?  [phone]

## 2021-06-02 DIAGNOSIS — I25.10 CORONARY ARTERY DISEASE INVOLVING NATIVE CORONARY ARTERY OF NATIVE HEART WITHOUT ANGINA PECTORIS: ICD-10-CM

## 2021-06-02 RX ORDER — OMEPRAZOLE 40 MG/1
CAPSULE, DELAYED RELEASE ORAL
Qty: 90 CAPSULE | Refills: 3 | Status: SHIPPED | OUTPATIENT
Start: 2021-06-02

## 2021-06-02 RX ORDER — LISINOPRIL 5 MG/1
TABLET ORAL
Qty: 90 TABLET | Refills: 3 | Status: SHIPPED | OUTPATIENT
Start: 2021-06-02

## 2021-06-02 NOTE — TELEPHONE ENCOUNTER
Pt called office requesting refills of her Omeprazole, Lisinopril and Metoprolol to [place]. If no call back, she will check with the pharmacy after 2pm. Refill if appropriate.

## 2021-06-04 ENCOUNTER — HOSPITAL ENCOUNTER (OUTPATIENT)
Dept: NON INVASIVE DIAGNOSTICS | Age: 66
Discharge: HOME | End: 2021-06-04
Payer: MEDICARE

## 2021-06-04 VITALS — WEIGHT: 285 LBS | BODY MASS INDEX: 47.48 KG/M2 | HEIGHT: 65 IN

## 2021-06-04 DIAGNOSIS — R06.02 SOB (SHORTNESS OF BREATH) ON EXERTION: ICD-10-CM

## 2021-06-04 DIAGNOSIS — I25.10 CORONARY ARTERY DISEASE INVOLVING NATIVE CORONARY ARTERY OF NATIVE HEART WITHOUT ANGINA PECTORIS: ICD-10-CM

## 2021-06-04 DIAGNOSIS — I42.8 OTHER CARDIOMYOPATHY (HCC): ICD-10-CM

## 2021-06-04 LAB
CHOLESTEROL/HDL RATIO: 2.9 (ref 1–5)
CHOLESTEROL: 145 MG/DL (ref 150–200)
HDLC SERPL-MCNC: 50 MG/DL
LDL CHOLESTEROL CALCULATED: 73 MG/DL
LDL/HDL RATIO: 1.5
LV EF: 60 %
LVEF MODALITY: NORMAL
TRIGL SERPL-MCNC: 109 MG/DL (ref 27–150)
VLDLC SERPL CALC-MCNC: 22 MG/DL (ref 0–30)

## 2021-06-04 PROCEDURE — 93306 TTE W/DOPPLER COMPLETE: CPT

## 2021-06-04 PROCEDURE — 6360000002 HC RX W HCPCS

## 2021-06-04 PROCEDURE — 78452 HT MUSCLE IMAGE SPECT MULT: CPT

## 2021-06-04 PROCEDURE — A9500 TC99M SESTAMIBI: HCPCS | Performed by: INTERNAL MEDICINE

## 2021-06-04 PROCEDURE — 3430000000 HC RX DIAGNOSTIC RADIOPHARMACEUTICAL: Performed by: INTERNAL MEDICINE

## 2021-06-04 PROCEDURE — 93017 CV STRESS TEST TRACING ONLY: CPT | Performed by: INTERNAL MEDICINE

## 2021-06-04 RX ADMIN — Medication 31.9 MILLICURIE: at 14:53

## 2021-06-04 RX ADMIN — Medication 9.5 MILLICURIE: at 13:55

## 2021-06-07 ENCOUNTER — TELEPHONE (OUTPATIENT)
Dept: CARDIOLOGY CLINIC | Age: 66
End: 2021-06-07

## 2021-06-07 DIAGNOSIS — R94.39 ABNORMAL STRESS TEST: Primary | ICD-10-CM

## 2021-06-07 NOTE — TELEPHONE ENCOUNTER
Result note from stress test  ----- Message from Emilee Deleon MD sent at 6/4/2021  8:43 PM EDT -----  Schedule [facility] on 6/23/2021 at 8 am

## 2021-06-07 NOTE — TELEPHONE ENCOUNTER
Left heart cath scheduled for 6/23/21 at 8:00 am per Dr. Greg Michele. [facility] for patient to call to receive details and instructions. Instructions also mailed to patient this date.

## 2021-06-08 DIAGNOSIS — M25.50 POLYARTHRALGIA: ICD-10-CM

## 2021-06-08 RX ORDER — IBUPROFEN 600 MG/1
TABLET ORAL
Qty: 360 TABLET | Refills: 0 | Status: SHIPPED | OUTPATIENT
Start: 2021-06-08 | End: 2021-08-12

## 2021-06-09 ENCOUNTER — TELEPHONE (OUTPATIENT)
Dept: FAMILY MEDICINE CLINIC | Age: 66
End: 2021-06-09

## 2021-06-09 NOTE — TELEPHONE ENCOUNTER
Patient left message stating that she wanted to schedule her heart cath, and that she is \"very hard to get ahold of.\" Patient left message at 1:40 pm, stating that she would be reachable for the next hour. Attempted to return patient's call at 2:13 pm, left another message for patient.

## 2021-06-09 NOTE — TELEPHONE ENCOUNTER
Called and updated the patient, scheduled her for knee injections on 6/11/21
Ok to keep appt with Tay Freitas
proceed with [employer] As Usual   Scheduling)?  Yes

## 2021-06-10 NOTE — TELEPHONE ENCOUNTER
Pt called, states will be unable to get off work 06-23-21, rescheduled heart cath to 06-30-21 @ 9:00am  Instructions reviewed

## 2021-06-10 NOTE — TELEPHONE ENCOUNTER
Spoke with patient, advised on date and time of cath and reviewed all instructions. Patient verbalized understanding.

## 2021-06-11 ENCOUNTER — OFFICE VISIT (OUTPATIENT)
Dept: FAMILY MEDICINE CLINIC | Age: 66
End: 2021-06-11
Payer: MEDICARE

## 2021-06-11 VITALS
WEIGHT: 285.6 LBS | RESPIRATION RATE: 16 BRPM | DIASTOLIC BLOOD PRESSURE: 80 MMHG | BODY MASS INDEX: 47.53 KG/M2 | HEART RATE: 76 BPM | SYSTOLIC BLOOD PRESSURE: 124 MMHG

## 2021-06-11 DIAGNOSIS — M17.11 ARTHRITIS OF RIGHT KNEE: Primary | ICD-10-CM

## 2021-06-11 DIAGNOSIS — M17.12 ARTHRITIS OF LEFT KNEE: ICD-10-CM

## 2021-06-11 PROCEDURE — 20610 DRAIN/INJ JOINT/BURSA W/O US: CPT | Performed by: NURSE PRACTITIONER

## 2021-06-11 PROCEDURE — 1090F PRES/ABSN URINE INCON ASSESS: CPT | Performed by: NURSE PRACTITIONER

## 2021-06-11 PROCEDURE — 3017F COLORECTAL CA SCREEN DOC REV: CPT | Performed by: NURSE PRACTITIONER

## 2021-06-11 PROCEDURE — 99213 OFFICE O/P EST LOW 20 MIN: CPT | Performed by: NURSE PRACTITIONER

## 2021-06-11 PROCEDURE — 1123F ACP DISCUSS/DSCN MKR DOCD: CPT | Performed by: NURSE PRACTITIONER

## 2021-06-11 PROCEDURE — G8417 CALC BMI ABV UP PARAM F/U: HCPCS | Performed by: NURSE PRACTITIONER

## 2021-06-11 PROCEDURE — 1036F TOBACCO NON-USER: CPT | Performed by: NURSE PRACTITIONER

## 2021-06-11 PROCEDURE — 4040F PNEUMOC VAC/ADMIN/RCVD: CPT | Performed by: NURSE PRACTITIONER

## 2021-06-11 PROCEDURE — G8427 DOCREV CUR MEDS BY ELIG CLIN: HCPCS | Performed by: NURSE PRACTITIONER

## 2021-06-11 PROCEDURE — G8399 PT W/DXA RESULTS DOCUMENT: HCPCS | Performed by: NURSE PRACTITIONER

## 2021-06-11 RX ORDER — METHYLPREDNISOLONE ACETATE 80 MG/ML
80 INJECTION, SUSPENSION INTRA-ARTICULAR; INTRALESIONAL; INTRAMUSCULAR; SOFT TISSUE ONCE
Status: COMPLETED | OUTPATIENT
Start: 2021-06-11 | End: 2021-06-11

## 2021-06-11 RX ADMIN — METHYLPREDNISOLONE ACETATE 80 MG: 80 INJECTION, SUSPENSION INTRA-ARTICULAR; INTRALESIONAL; INTRAMUSCULAR; SOFT TISSUE at 11:06

## 2021-06-11 SDOH — ECONOMIC STABILITY: FOOD INSECURITY: WITHIN THE PAST 12 MONTHS, YOU WORRIED THAT YOUR FOOD WOULD RUN OUT BEFORE YOU GOT MONEY TO BUY MORE.: NEVER TRUE

## 2021-06-11 SDOH — ECONOMIC STABILITY: FOOD INSECURITY: WITHIN THE PAST 12 MONTHS, THE FOOD YOU BOUGHT JUST DIDN'T LAST AND YOU DIDN'T HAVE MONEY TO GET MORE.: NEVER TRUE

## 2021-06-11 ASSESSMENT — SOCIAL DETERMINANTS OF HEALTH (SDOH): HOW HARD IS IT FOR YOU TO PAY FOR THE VERY BASICS LIKE FOOD, HOUSING, MEDICAL CARE, AND HEATING?: NOT HARD AT ALL

## 2021-06-11 NOTE — PROGRESS NOTES
Visit Information    Have you changed or started any medications since your last visit including any over-the-counter medicines, vitamins, or herbal medicines? Yes--Meds updated  Are you having any side effects from any of your medications? -  no  Have you stopped taking any of your medications? Is so, why? -  No    Have you seen any other physician or provider since your last visit? Yes - Records Obtained  Have you had any other diagnostic tests since your last visit? Yes - Records Obtained  Have you been seen in the emergency room and/or had an admission to a hospital since we last saw you? No  Have you had your routine dental cleaning in the past 6 months? no    Have you activated your [employer] account? If not, what are your barriers?  Yes     Patient Care Team:  Shima Lugo DO as PCP - General (Family Medicine)  Shima Lugo DO as PCP - [facility] Empaneled Provider  Shreyas Kamara DPM as Consulting Physician (Podiatry)  KELIN Cyr - CNP as Nurse Practitioner (Nurse Practitioner)    Medical History Review  Past Medical, Family, and Social History reviewed and does contribute to the patient presenting condition    Health Maintenance   Topic Date Due    COVID-19 Vaccine (1) Never done    DTaP/Tdap/Td vaccine (1 - Tdap) Never done    Shingles Vaccine (1 of 2) Never done    Annual Wellness Visit (AWV)  Never done    Pneumococcal 65+ years Vaccine (2 of 2 - PPSV23) 09/17/2020    Breast cancer screen  06/06/2021    A1C test (Diabetic or Prediabetic)  12/09/2021    TSH testing  12/09/2021    Potassium monitoring  12/09/2021    Creatinine monitoring  12/09/2021    Lipid screen  06/03/2022    Colon cancer screen fecal DNA test (Cologuard)  08/01/2022    DEXA (modify frequency per FRAX score)  Completed    Flu vaccine  Completed    Hepatitis C screen  Completed    Hepatitis A vaccine  Aged Out    Hepatitis B vaccine  Aged Out    Hib vaccine  Aged Out    Meningococcal (ACWY) vaccine Aged Out       Administrations This Visit     methylPREDNISolone acetate (DEPO-MEDROL) injection 80 mg     Admin Date  06/11/2021 Action  Given Dose  80 mg Route  Intra-articular Administered By  Jc Clark LPN    Admin Date  52/87/5850 Action  Given Dose  80 mg Route  Intra-articular Administered By  Jc Clark LPN

## 2021-06-13 ASSESSMENT — ENCOUNTER SYMPTOMS: RESPIRATORY NEGATIVE: 1

## 2021-06-13 NOTE — PROGRESS NOTES
Joby Francois (1955) 78 y.o. female here for evaluation of the following chief complaint(s):      HPI:  Chief Complaint   Patient presents with    Knee Pain     bilateral       Has been following with [facility]TH[facility]. Needs a joint replacement but she is pushing it off. Bilateral knee arthritis. Benefit with KNEE INJ in past - last completed 9 months ago. Requesting in office today    Vitals:    06/11/21 1051   BP: 124/80   Pulse: 76   Resp: 16       Patient Active Problem List   Diagnosis    CAD (coronary artery disease) 7/2014- mid lad 50 % patent LM< LCX and RCA    Hyperlipidemia LDL goal <70    Hypothyroid    History of total thyroidectomy    GERD (gastroesophageal reflux disease)    PUD (peptic ulcer disease)    IBS (irritable bowel syndrome)    IFG (impaired fasting glucose)    Vitamin D deficiency    SOB (shortness of breath) on exertion    Chest pain    Heart palpitations- intermittent    Abnormal nuclear stress test- anteroior and anteroseptal ischemia    Chest pain, atypical    Abdominal pain    Stress incontinence (female) (male)       SUBJECTIVE/OBJECTIVE:  Review of Systems   Constitutional: Negative. Respiratory: Negative. Cardiovascular: Negative. Musculoskeletal: Positive for arthralgias. Physical Exam  Constitutional:       General: She is not in acute distress. Appearance: She is not ill-appearing. Cardiovascular:      Rate and Rhythm: Normal rate and regular rhythm. Pulses: Normal pulses. Heart sounds: Normal heart sounds. Pulmonary:      Effort: Pulmonary effort is normal.      Breath sounds: Normal breath sounds. Musculoskeletal:      Right knee: Decreased range of motion. Tenderness present over the medial joint line and lateral joint line. Left knee: Decreased range of motion. Tenderness present over the medial joint line and lateral joint line. Neurological:      Mental Status: She is alert.            ASSESSMENT/PLAN:   Diagnosis Orders 1. Arthritis of right knee  methylPREDNISolone acetate (DEPO-MEDROL) injection 80 mg    [id number] - ID DRAIN/INJECT LARGE JOINT/BURSA   2. Arthritis of left knee  methylPREDNISolone acetate (DEPO-MEDROL) injection 80 mg    [id number] - ID DRAIN/INJECT LARGE JOINT/BURSA         MDM:    After patient consent obtained, the Bilateral infrapatellar region of the knee was cleansed and anesthetized with ethyl chloride. The knee was then injected with DepoMedrol 80mg mixed with 4cc of Marcaine. Ice then applied for 10 minutes. An electronic signature was used to authenticate this note.     --Lorenzo Zavala, KELIN - CNP

## 2021-06-23 ENCOUNTER — PRE-PROCEDURE TELEPHONE (OUTPATIENT)
Dept: INPATIENT UNIT | Age: 66
End: 2021-06-23

## 2021-06-29 ENCOUNTER — PREP FOR PROCEDURE (OUTPATIENT)
Dept: CARDIOLOGY | Age: 66
End: 2021-06-29

## 2021-06-29 RX ORDER — NITROGLYCERIN 0.4 MG/1
0.4 TABLET SUBLINGUAL EVERY 5 MIN PRN
Status: CANCELLED | OUTPATIENT
Start: 2021-06-29

## 2021-06-29 RX ORDER — ASPIRIN 325 MG
325 TABLET ORAL ONCE
Status: CANCELLED | OUTPATIENT
Start: 2021-06-29 | End: 2021-06-29

## 2021-06-29 RX ORDER — SODIUM CHLORIDE 9 MG/ML
25 INJECTION, SOLUTION INTRAVENOUS PRN
Status: CANCELLED | OUTPATIENT
Start: 2021-06-29

## 2021-06-29 RX ORDER — DIPHENHYDRAMINE HYDROCHLORIDE 50 MG/ML
50 INJECTION INTRAMUSCULAR; INTRAVENOUS ONCE
Status: CANCELLED | OUTPATIENT
Start: 2021-06-29 | End: 2021-06-29

## 2021-06-29 RX ORDER — SODIUM CHLORIDE 9 MG/ML
INJECTION, SOLUTION INTRAVENOUS CONTINUOUS
Status: CANCELLED | OUTPATIENT
Start: 2021-06-29

## 2021-06-29 RX ORDER — SODIUM CHLORIDE 0.9 % (FLUSH) 0.9 %
5-40 SYRINGE (ML) INJECTION PRN
Status: CANCELLED | OUTPATIENT
Start: 2021-06-29

## 2021-06-29 RX ORDER — SODIUM CHLORIDE 0.9 % (FLUSH) 0.9 %
5-40 SYRINGE (ML) INJECTION EVERY 12 HOURS SCHEDULED
Status: CANCELLED | OUTPATIENT
Start: 2021-06-29

## 2021-07-01 ENCOUNTER — TELEPHONE (OUTPATIENT)
Dept: FAMILY MEDICINE CLINIC | Age: 66
End: 2021-07-01

## 2021-07-01 NOTE — TELEPHONE ENCOUNTER
[facility] Trans[facility] Initial Follow Up Call    Outreach made within 2 business days of discharge: Yes    Patient: Kaz Loaiza Patient : 1955   MRN: [id number]  Reason for Admission: There are no discharge diagnoses documented for the most recent discharge. Discharge Date: 21       Spoke with: Pt    Discharge department/facility: [facility]    TCM Interactive Patient Contact:  Was patient able to fill all prescriptions: No, will get rx filled today  Was patient instructed to bring all medications to the follow-up visit: Yes  Is patient taking all medications as directed in the discharge summary? Yes  Does patient understand their discharge instructions: Yes  Does patient have questions or concerns that need addressed prior to 7-14 day follow up office visit: no    Scheduled appointment with PCP within 7-14 days. Pt says she is to follow up with Cardio, declined an appt with us at this time.     Follow Up  Future Appointments   Date Time Provider Latasha Davies   2021  3:30 PM Ariadna Salgado MD N [facility] - [address] [place], [address] [place] [address] Ave ([address] [place] [address])

## 2021-07-08 ENCOUNTER — TELEPHONE (OUTPATIENT)
Dept: CARDIOLOGY CLINIC | Age: 66
End: 2021-07-08

## 2021-07-08 DIAGNOSIS — Z48.812 ENCOUNTER FOR SURGICAL AFTERCARE FOLLOWING SURGERY ON THE CIRCULATORY SYSTEM: ICD-10-CM

## 2021-07-08 DIAGNOSIS — M79.601 PAIN OF RIGHT UPPER EXTREMITY: Primary | ICD-10-CM

## 2021-07-08 NOTE — TELEPHONE ENCOUNTER
Pt called stating she has developed a small knot/bump under the skin of her right arm at cath site (cath done 6-30-21) pt c/o some mild arm pain/soreness--denies redness. Pt wanting recommendations on what to do or if she needs a scan of her arm.

## 2021-07-09 NOTE — TELEPHONE ENCOUNTER
Left msg for pt to call office to schedule upper extremity duplex    msg stated to call office if before 2:00pm or to central scheduling (number given) if after 2:00

## 2021-07-09 NOTE — TELEPHONE ENCOUNTER
Pt left msg returning call    Call to pt, left msg for pt to call office or directly to central scheduling

## 2021-07-13 ENCOUNTER — HOSPITAL ENCOUNTER (OUTPATIENT)
Dept: INTERVENTIONAL RADIOLOGY/VASCULAR | Age: 66
Discharge: HOME | End: 2021-07-13
Payer: MEDICARE

## 2021-07-13 DIAGNOSIS — Z48.812 ENCOUNTER FOR SURGICAL AFTERCARE FOLLOWING SURGERY ON THE CIRCULATORY SYSTEM: ICD-10-CM

## 2021-07-13 DIAGNOSIS — M79.601 PAIN OF RIGHT UPPER EXTREMITY: ICD-10-CM

## 2021-07-13 PROCEDURE — 93931 UPPER EXTREMITY STUDY: CPT

## 2021-07-14 ENCOUNTER — OFFICE VISIT (OUTPATIENT)
Dept: FAMILY MEDICINE CLINIC | Age: 66
End: 2021-07-14
Payer: MEDICARE

## 2021-07-14 ENCOUNTER — TELEPHONE (OUTPATIENT)
Dept: CARDIOLOGY CLINIC | Age: 66
End: 2021-07-14

## 2021-07-14 VITALS
WEIGHT: 273.9 LBS | BODY MASS INDEX: 45.58 KG/M2 | RESPIRATION RATE: 16 BRPM | SYSTOLIC BLOOD PRESSURE: 124 MMHG | DIASTOLIC BLOOD PRESSURE: 72 MMHG | HEART RATE: 68 BPM

## 2021-07-14 DIAGNOSIS — M46.1 BILATERAL SACROILIITIS (HCC): Primary | ICD-10-CM

## 2021-07-14 DIAGNOSIS — Z12.31 ENCOUNTER FOR SCREENING MAMMOGRAM FOR BREAST CANCER: ICD-10-CM

## 2021-07-14 DIAGNOSIS — M25.50 POLYARTHRALGIA: ICD-10-CM

## 2021-07-14 PROCEDURE — G8427 DOCREV CUR MEDS BY ELIG CLIN: HCPCS | Performed by: NURSE PRACTITIONER

## 2021-07-14 PROCEDURE — 99213 OFFICE O/P EST LOW 20 MIN: CPT | Performed by: NURSE PRACTITIONER

## 2021-07-14 PROCEDURE — G8399 PT W/DXA RESULTS DOCUMENT: HCPCS | Performed by: NURSE PRACTITIONER

## 2021-07-14 PROCEDURE — 96372 THER/PROPH/DIAG INJ SC/IM: CPT | Performed by: NURSE PRACTITIONER

## 2021-07-14 PROCEDURE — G8417 CALC BMI ABV UP PARAM F/U: HCPCS | Performed by: NURSE PRACTITIONER

## 2021-07-14 PROCEDURE — 1123F ACP DISCUSS/DSCN MKR DOCD: CPT | Performed by: NURSE PRACTITIONER

## 2021-07-14 PROCEDURE — 1036F TOBACCO NON-USER: CPT | Performed by: NURSE PRACTITIONER

## 2021-07-14 PROCEDURE — 4040F PNEUMOC VAC/ADMIN/RCVD: CPT | Performed by: NURSE PRACTITIONER

## 2021-07-14 PROCEDURE — 3017F COLORECTAL CA SCREEN DOC REV: CPT | Performed by: NURSE PRACTITIONER

## 2021-07-14 PROCEDURE — 1090F PRES/ABSN URINE INCON ASSESS: CPT | Performed by: NURSE PRACTITIONER

## 2021-07-14 RX ORDER — METHYLPREDNISOLONE ACETATE 40 MG/ML
40 INJECTION, SUSPENSION INTRA-ARTICULAR; INTRALESIONAL; INTRAMUSCULAR; SOFT TISSUE ONCE
Status: COMPLETED | OUTPATIENT
Start: 2021-07-14 | End: 2021-07-14

## 2021-07-14 RX ORDER — METHYLPREDNISOLONE ACETATE 80 MG/ML
80 INJECTION, SUSPENSION INTRA-ARTICULAR; INTRALESIONAL; INTRAMUSCULAR; SOFT TISSUE ONCE
Status: COMPLETED | OUTPATIENT
Start: 2021-07-14 | End: 2021-07-14

## 2021-07-14 RX ADMIN — METHYLPREDNISOLONE ACETATE 80 MG: 80 INJECTION, SUSPENSION INTRA-ARTICULAR; INTRALESIONAL; INTRAMUSCULAR; SOFT TISSUE at 15:01

## 2021-07-14 RX ADMIN — METHYLPREDNISOLONE ACETATE 40 MG: 40 INJECTION, SUSPENSION INTRA-ARTICULAR; INTRALESIONAL; INTRAMUSCULAR; SOFT TISSUE at 15:01

## 2021-07-14 NOTE — PROGRESS NOTES
Patient was given Depo Medrol 120mg 2ml IM in left dorsogluteal per v.o. Dr Jemal Ordonez. Patient tolerated well and without incident.       Administrations This Visit     methylPREDNISolone acetate (DEPO-MEDROL) injection 40 mg     Admin Date  07/14/2021 Action  Given Dose  40 mg Route  Intramuscular Administered By  Aviva Watters LPN          methylPREDNISolone acetate (DEPO-MEDROL) injection 80 mg     Admin Date  07/14/2021 Action  Given Dose  80 mg Route  Intramuscular Administered By  Aviva Watters LPN

## 2021-07-14 NOTE — TELEPHONE ENCOUNTER
----- Message from Mahin Mccartney MD sent at 7/13/2021  5:11 PM EDT -----  Only small hematoma  No significant findings       IMPRESSION:  Small hematoma at the catheter puncture site. Study otherwise unremarkable. No pseudoaneurysm seen.           **This report has been created using voice recognition software. It may contain minor errors which are inherent in voice recognition technology. **        Final report electronically signed by Dr. Megan Pickett on 7/13/2021 5:00 PM         Last Resulted: 07/13/21 17:00

## 2021-07-14 NOTE — PROGRESS NOTES
Kevin Kwok (1955) 78 y.o. female here for evaluation of the following chief complaint(s):      HPI:  Chief Complaint   Patient presents with    Back Pain     left side > right    Cough       Pt here for LBP with radiation into the left groin and into the upper thigh region. This started a week ago, NKI. She is using ice and Motrin with no relief. Chronic sciatica pain and SI joint dysfunction.      Pt twists a lot at her job, not sure if that did it. Hard to walk long distance or go up stairs or steps without pain in the lower back. Tightness.      Denies b-b changes. Vitals:    07/14/21 1441   BP: 124/72   Pulse: 68   Resp: 16       Patient Active Problem List   Diagnosis    CAD (coronary artery disease) 7/2014- mid lad 50 % patent LM< LCX and RCA    Hyperlipidemia LDL goal <70    Hypothyroid    History of total thyroidectomy    GERD (gastroesophageal reflux disease)    PUD (peptic ulcer disease)    IBS (irritable bowel syndrome)    IFG (impaired fasting glucose)    Vitamin D deficiency    SOB (shortness of breath) on exertion    Chest pain    Heart palpitations- intermittent    Abnormal nuclear stress test- anteroior and anteroseptal ischemia    Chest pain, atypical    Abdominal pain    Stress incontinence (female) (male)    Abnormal cardiovascular stress test    GOLDEN (dyspnea on exertion)       SUBJECTIVE/OBJECTIVE:  Review of Systems   Constitutional: Negative. HENT: Negative. Respiratory: Negative. Cardiovascular: Negative. Gastrointestinal: Negative. Musculoskeletal: Positive for back pain. All other systems reviewed and are negative. Physical Exam  Vitals and nursing note reviewed. Constitutional:       Appearance: She is well-developed. HENT:      Head: Normocephalic and atraumatic. Right Ear: Tympanic membrane normal.      Left Ear: Tympanic membrane normal.   Cardiovascular:      Rate and Rhythm: Normal rate and regular rhythm.       Heart sounds: Normal heart sounds. No murmur heard. Pulmonary:      Effort: Pulmonary effort is normal.      Breath sounds: Normal breath sounds. Abdominal:      General: Bowel sounds are normal.      Palpations: Abdomen is soft. Musculoskeletal:      Lumbar back: Tenderness present. No spasms. Decreased range of motion. Back:    Skin:     General: Skin is warm and dry. Neurological:      Mental Status: She is alert and oriented to person, place, and time. Psychiatric:         Behavior: Behavior normal.           ASSESSMENT/PLAN:   Diagnosis Orders   1. Bilateral sacroiliitis (HCC)  [facility] - [facility]    methylPREDNISolone acetate (DEPO-MEDROL) injection 80 mg    methylPREDNISolone acetate (DEPO-MEDROL) injection 40 mg   2. Polyarthralgia     3. Encounter for screening mammogram for breast cancer  DEMARIO Digital Screen Bilateral [[id number]]         MDM: DEPO 120 IM   Refer to PT for #1   Continue Heat/ICE   MAMMO ordered   RTO Prn      An electronic signature was used to authenticate this note.     --Ash Freedman, APRN - CNP

## 2021-07-15 ASSESSMENT — ENCOUNTER SYMPTOMS
BACK PAIN: 1
RESPIRATORY NEGATIVE: 1
GASTROINTESTINAL NEGATIVE: 1

## 2021-07-19 ENCOUNTER — TELEPHONE (OUTPATIENT)
Dept: FAMILY MEDICINE CLINIC | Age: 66
End: 2021-07-19

## 2021-07-19 DIAGNOSIS — I25.10 CORONARY ARTERY DISEASE INVOLVING NATIVE CORONARY ARTERY OF NATIVE HEART WITHOUT ANGINA PECTORIS: Primary | ICD-10-CM

## 2021-07-19 NOTE — TELEPHONE ENCOUNTER
Pt called asking for a parking placard order for 1 car. Pt would like a call back with order is ready for .

## 2021-07-20 LAB
ANION GAP SERPL CALCULATED.3IONS-SCNC: 9 MMOL/L (ref 5–15)
BUN BLDV-MCNC: 27 MG/DL (ref 5–27)
CALCIUM SERPL-MCNC: 8.8 MG/DL (ref 8.5–10.5)
CHLORIDE BLD-SCNC: 107 MMOL/L (ref 98–109)
CO2: 25 MMOL/L (ref 22–32)
CREAT SERPL-MCNC: 0.84 MG/DL (ref 0.4–1)
EGFR AFRICAN AMERICAN: >60 ML/MIN/1.73SQ.M
EGFR IF NONAFRICAN AMERICAN: >60 ML/MIN/1.73SQ.M
GLUCOSE: 68 MG/DL (ref 65–99)
MAGNESIUM: 1.9 MG/DL (ref 1.8–2.6)
POTASSIUM SERPL-SCNC: 3.5 MMOL/L (ref 3.5–5)
SODIUM BLD-SCNC: 141 MMOL/L (ref 134–146)

## 2021-07-20 NOTE — TELEPHONE ENCOUNTER
Pt called office back stating she picked up the [employer] placard and it exp 7/19/2021. She would like this corrected. If no call back she will try to stop today before 4pm for . Please advise.

## 2021-07-21 ENCOUNTER — TELEPHONE (OUTPATIENT)
Dept: FAMILY MEDICINE CLINIC | Age: 66
End: 2021-07-21

## 2021-07-21 RX ORDER — BACLOFEN 20 MG/1
20 TABLET ORAL 3 TIMES DAILY PRN
Qty: 15 TABLET | Refills: 0 | Status: SHIPPED | OUTPATIENT
Start: 2021-07-21 | End: 2021-07-26

## 2021-07-21 NOTE — TELEPHONE ENCOUNTER
Pt called office stating she got a shot recently for her back pain, it helped for a couple days. Pt is now back to walking \"hunched over\" and is \"full of pain again\". Pt uses [facility], if no call back she will check with the pharmacy after 3pm. Please advise.

## 2021-07-26 ENCOUNTER — TELEPHONE (OUTPATIENT)
Dept: FAMILY MEDICINE CLINIC | Age: 66
End: 2021-07-26

## 2021-07-26 NOTE — TELEPHONE ENCOUNTER
----- Message from Eran Johnson sent at 7/26/2021 12:35 PM EDT -----  Subject: Appointment Request    Reason for Call: Urgent Back Neck Pain    QUESTIONS  Type of Appointment? Established Patient  Reason for appointment request? No appointments available during search  Additional Information for Provider? Patient calling in stating she's   having back pain again she stated that she seen pcp for this before . . try   to schedule appt but no available if can please call patient she stated   shes been doing heat and ice for 3days   ---------------------------------------------------------------------------  --------------  CALL BACK INFO  What is the best way for the office to contact you? OK to leave message on   voicemail  Preferred Call Back Phone Number? [phone]  ---------------------------------------------------------------------------  --------------  SCRIPT ANSWERS  Relationship to Patient? Self  (Is the patient requesting to be seen urgently for their symptoms?)? No  Is this follow up request related to routine Diabetes Management? No  Are you having any new concerns about your existing condition? No  Have you been diagnosed with, awaiting test results for, or told that you   are suspected of having COVID-19 (Coronavirus)? (If patient has tested   negative or was tested as a requirement for work, school, or travel and   not based on symptoms, answer no)? No  Do you currently have flu-like symptoms including fever or chills, cough,   shortness of breath, difficulty breathing, or new loss of taste or smell? No  Have you had close contact with someone with COVID-19 in the last 14 days? No  (Service Expert  click yes below to proceed with [employer] As Usual   Scheduling)? Yes  Did you have an injury or trauma within the past 3 days? No  Are you having new problems with your bowel or bladder control? No  Are you having new onset numbness, tingling, or weakness in your arms   and/or legs with this pain? No  Did your pain begin within the past 14 days?  Yes

## 2021-07-27 ENCOUNTER — OFFICE VISIT (OUTPATIENT)
Dept: FAMILY MEDICINE CLINIC | Age: 66
End: 2021-07-27
Payer: MEDICARE

## 2021-07-27 VITALS
BODY MASS INDEX: 46.44 KG/M2 | HEART RATE: 82 BPM | DIASTOLIC BLOOD PRESSURE: 84 MMHG | RESPIRATION RATE: 20 BRPM | WEIGHT: 279.1 LBS | SYSTOLIC BLOOD PRESSURE: 138 MMHG

## 2021-07-27 DIAGNOSIS — M79.18 MYOFASCIAL PAIN SYNDROME OF LUMBAR SPINE: Primary | ICD-10-CM

## 2021-07-27 DIAGNOSIS — M51.36 DDD (DEGENERATIVE DISC DISEASE), LUMBAR: ICD-10-CM

## 2021-07-27 PROCEDURE — 1090F PRES/ABSN URINE INCON ASSESS: CPT | Performed by: FAMILY MEDICINE

## 2021-07-27 PROCEDURE — 96372 THER/PROPH/DIAG INJ SC/IM: CPT | Performed by: FAMILY MEDICINE

## 2021-07-27 PROCEDURE — 1123F ACP DISCUSS/DSCN MKR DOCD: CPT | Performed by: FAMILY MEDICINE

## 2021-07-27 PROCEDURE — 1036F TOBACCO NON-USER: CPT | Performed by: FAMILY MEDICINE

## 2021-07-27 PROCEDURE — G8417 CALC BMI ABV UP PARAM F/U: HCPCS | Performed by: FAMILY MEDICINE

## 2021-07-27 PROCEDURE — 4040F PNEUMOC VAC/ADMIN/RCVD: CPT | Performed by: FAMILY MEDICINE

## 2021-07-27 PROCEDURE — 99214 OFFICE O/P EST MOD 30 MIN: CPT | Performed by: FAMILY MEDICINE

## 2021-07-27 PROCEDURE — G8399 PT W/DXA RESULTS DOCUMENT: HCPCS | Performed by: FAMILY MEDICINE

## 2021-07-27 PROCEDURE — G8427 DOCREV CUR MEDS BY ELIG CLIN: HCPCS | Performed by: FAMILY MEDICINE

## 2021-07-27 PROCEDURE — 3017F COLORECTAL CA SCREEN DOC REV: CPT | Performed by: FAMILY MEDICINE

## 2021-07-27 RX ORDER — TIZANIDINE 2 MG/1
2-4 TABLET ORAL EVERY 8 HOURS PRN
Qty: 30 TABLET | Refills: 0 | Status: SHIPPED | OUTPATIENT
Start: 2021-07-27 | End: 2021-08-06 | Stop reason: SDUPTHER

## 2021-07-27 RX ORDER — KETOROLAC TROMETHAMINE 30 MG/ML
60 INJECTION, SOLUTION INTRAMUSCULAR; INTRAVENOUS ONCE
Status: COMPLETED | OUTPATIENT
Start: 2021-07-27 | End: 2021-07-27

## 2021-07-27 RX ORDER — KETOROLAC TROMETHAMINE 10 MG/1
10 TABLET, FILM COATED ORAL EVERY 6 HOURS PRN
Qty: 20 TABLET | Refills: 0 | Status: SHIPPED | OUTPATIENT
Start: 2021-07-27 | End: 2021-08-06

## 2021-07-27 RX ADMIN — KETOROLAC TROMETHAMINE 60 MG: 30 INJECTION, SOLUTION INTRAMUSCULAR; INTRAVENOUS at 12:59

## 2021-07-27 ASSESSMENT — ENCOUNTER SYMPTOMS
RESPIRATORY NEGATIVE: 1
GASTROINTESTINAL NEGATIVE: 1
BACK PAIN: 1

## 2021-07-27 NOTE — PROGRESS NOTES
Natty Archibald (:  1955) is a 78 y.o. female,Established patient, here for evaluation of the following chief complaint(s):  Lower Back Pain (DENIES leg pain, DENIES injury- taking OTC Ibuprofen, DC muscle relaxer due to SE dizziness )                Subjective   SUBJECTIVE/OBJECTIVE:  HPI:    Chief Complaint   Patient presents with    Lower Back Pain     DENIES leg pain, DENIES injury- taking OTC Ibuprofen, DC muscle relaxer due to SE dizziness      Pt here for ongoing LBP. Was seen by Reena Price a few weeks ago and gave her Depo shot. Helped a little. Pain intensified over the weekend. Worse with twisting. Scheduled to start PT next week. No radicular symptoms.     Patient Active Problem List   Diagnosis    CAD (coronary artery disease) 2014- mid lad 50 % patent LM< LCX and RCA    Hyperlipidemia LDL goal <70    Hypothyroid    History of total thyroidectomy    GERD (gastroesophageal reflux disease)    PUD (peptic ulcer disease)    IBS (irritable bowel syndrome)    IFG (impaired fasting glucose)    Vitamin D deficiency    SOB (shortness of breath) on exertion    Chest pain    Heart palpitations- intermittent    Abnormal nuclear stress test- anteroior and anteroseptal ischemia    Chest pain, atypical    Abdominal pain    Stress incontinence (female) (male)    Abnormal cardiovascular stress test    GOLDEN (dyspnea on exertion)     Past Surgical History:   Procedure Laterality Date    CARDIAC CATHETERIZATION      CARPAL TUNNEL RELEASE      CHOLECYSTECTOMY      COLONOSCOPY      HAMMER TOE SURGERY Left     HEEL SPUR SURGERY      HYSTERECTOMY      KNEE SURGERY  2009    TOTAL THYROIDECTOMY      UPPER GASTROINTESTINAL ENDOSCOPY  2009     Social History     Tobacco Use    Smoking status: Never Smoker    Smokeless tobacco: Never Used   Vaping Use    Vaping Use: Never used   Substance Use Topics    Alcohol use: No    Drug use: No         Review of Systems   Constitutional: Negative. HENT: Negative. Respiratory: Negative. Cardiovascular: Negative. Gastrointestinal: Negative. Musculoskeletal: Positive for back pain. Neurological: Negative for numbness. All other systems reviewed and are negative. Objective   Physical Exam  Vitals and nursing note reviewed. Constitutional:       General: She is not in acute distress. Appearance: Normal appearance. She is well-developed. HENT:      Head: Normocephalic and atraumatic. Right Ear: Tympanic membrane normal.      Left Ear: Tympanic membrane normal.   Eyes:      Conjunctiva/sclera: Conjunctivae normal.   Cardiovascular:      Rate and Rhythm: Normal rate and regular rhythm. Heart sounds: Normal heart sounds. No murmur heard. Pulmonary:      Effort: Pulmonary effort is normal.      Breath sounds: Normal breath sounds. No wheezing, rhonchi or rales. Abdominal:      General: There is no distension. Musculoskeletal:      Cervical back: Neck supple. Lumbar back: Tenderness present. Decreased range of motion. Back:    Skin:     General: Skin is warm and dry. Findings: No rash (on exposed surfaces). Neurological:      General: No focal deficit present. Mental Status: She is alert. Psychiatric:         Attention and Perception: Attention normal.         Mood and Affect: Mood normal.         Speech: Speech normal.         Behavior: Behavior normal. Behavior is cooperative. Thought Content: Thought content normal.         Judgment: Judgment normal.     ASSESSMENT/PLAN:  1. Myofascial pain syndrome of lumbar spine  -     tiZANidine (ZANAFLEX) 2 MG tablet; Take 1-2 tablets by mouth every 8 hours as needed (muscle spasms), Disp-30 tablet, R-0Normal  -     ketorolac (TORADOL) injection 60 mg; 60 mg, Intramuscular, ONCE, On Tue 7/27/21 at 1315, For 1 doseDo not administer for more than 5 days. -     ketorolac (TORADOL) 10 MG tablet;  Take 1 tablet by mouth every 6 hours as needed for Pain, Disp-20 tablet, R-0Normal  2. DDD (degenerative disc disease), lumbar    -  Orders above  -  Hold Motrin  -  Alternate heat/ice  -  Starts PT next week    Return if symptoms worsen or fail to improve. An electronic signature was used to authenticate this note.     --Aysha Rodgers, DO

## 2021-08-02 ENCOUNTER — HOSPITAL ENCOUNTER (OUTPATIENT)
Dept: PHYSICAL THERAPY | Age: 66
Setting detail: THERAPIES SERIES
Discharge: HOME | End: 2021-08-02
Payer: MEDICARE

## 2021-08-02 PROCEDURE — 97162 PT EVAL MOD COMPLEX 30 MIN: CPT

## 2021-08-02 NOTE — PROGRESS NOTES
Physician Follow-Up: Follow up with as needed. Physician Orders: PT evaluate and treat   History of Present Illness: Patient reports of years of back pain. States that back pain has gotten worse this past year. Did receive a steroid injection last week. 2 different pain pills from Dr. Yg Bangura last week. SUBJECTIVE:Present pain is across her low back and notes sometimes on left side more than right. No longer having pain go down the left leg for past 2 weeks in morning. \"pinched nerve feeling\" Notes walking distance is limited due to her back pain. Notes following shots/injections the 2nd one helped the most. Bending, lifting, twisting aggravate her low back. Cannot lay on right side due to pulling across her low back as well as lying on her back. Prefers to lay on left side to sleep. Use cart /shopping to support herself when working. Noting also difficulty  Standing and walking straighter trunk. She tends to lean forward at trunk. No pain with sitting. Notes ice helps ease her back pain. Social/Functional History and Current Status:  Medications and Allergies have been reviewed and are listed on Medical History Questionnaire. Claribel Richards lives son in a multiple floor home with ability to complete ADL's on main floor with 2 steps into garage. .    Task Previous Current   ADLs  Independent Modified Independent stair steps flare her up she has to use support to negotiate steps. No lifting, yardwork and housework. IADL's Modified Independent Modified Independent see above. Ambulation Independent Modified Independent uses walker when back is out/flared up. Has not used walker since last Monday. Transfers Modified Independent Modified Independent heavy lean on UEs on armrests/seat of chair.     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 Sitting 2/10 at bilateral lumbar region left >right. Palpation Note tenderness at lumbosacral region, left>right, center of back L5S1, left mid gluteal region,    Observation Note mild-moderate scoliosis. Forward flexed trunk with standing, WB decreased through LLE, Heavy wt shift to right in ambulation, In standing note right iliac crest higher, left convexity at lumbar and thoracic region due to scoliosis. Posture See above. Sitting posture leans to right with trunk. Range of Motion Trunk forward flexion: fingertips to mid shin level 20% restriction, Extension painful past neutral, LE ROM: hip flexion right 110 degrees, left 90 degrees, SLR right 80 degrees, left 60 degrees, er/ir WNLS right/left LEs. Strength Right hip 4/5, left hip 4-/5, knee extension/flexion 5/5,        Sensation Intact BLEs    Bed Mobility Independent using log roll technique   Transfers Modified independent with heavy support of UEs on thighs or chair armrest to sit<>stand. Decreased eccentric control lowering into seat. Ambulation Note forward flexed trunk Decreased wt shift through LLE, waddling type gait,    Stairs Not assessed due to increases back pain. Decreased strength through LE with difficulty pushing up off leg. Balance Narrow stance with eyes closed increased sway, tandem stance r/l 14 seconds, l/r 4 seconds. Special Tests Flexion responder, Extension past neutral painful. Decreased core control , decreased abdominal strength         TREATMENT   Precautions: Flexion responder, decreased core control. /decreased strength hip and abdominal muscles.     Pain:     X in shaded column indicates activity completed today   Modalities Parameters/  Location  Notes                     Manual Therapy Time/Technique  Notes                     Exercise/Intervention   Notes Specific Interventions Next Treatment: US, soft tissue mobilization with massage/manual therapy bilateral lumbar and left piriformis region. Therap ex for core control, increased awareness of optimal lumbar neutral, stabilization progressing to dynamic stabilization, core strengthening, LE strengthening proximally at hip musculature. Activity/Treatment Tolerance:  []  Patient tolerated treatment well  []  Patient limited by fatigue  [x]  Patient limited by pain   []  Patient limited by medical complications  []  Other:     Assessment: Patient referred to PT for lumbosacral pain left >right. Patient demonstrates 20% restriction in lumbar flexion, extension painful past neutral. Flexion responder and optimal lumbar neutral position decreases lumbosacral pain. Noted scoliosis with uneven wt shift through right/left LEs with standing and ambulation. Moderate gaurding with bed mobility and difficulty with sit<>stand transitions due to back pain and decreased strength. Will follow 2x per week and pending on insurance authorization. Body Structures/Functions/Activity Limitations: impaired activity tolerance, impaired balance, impaired ROM, impaired strength, pain, abnormal gait and abnormal posture  Prognosis: good    GOALS:  Patient Goal: Patient would like to have less back pain and move better with transfers, walking and standing tolerance    Short Term Goals:  Time Frame: 4 weeks  1. Patient to have decreased pain levels lumbar spine region. Left posterior hip/mid gluteal region from 5-9/10 with standing and walking to 2-6/10.  2. Patient to demonstrate increased ease with mobility with transfers (sit<>stand), bed mobility and negotiating single step with improved strength LEs and core with hip strength left LE 4-/5 to 4/5, right LE from 4/5 to 4+/5,   3.  Patient to demonstrate increased core control with awareness of optimal lumbar neutral and increased abdominal strength with increased ease with walking, standing. Tolerate up to 10 reps with a series of ex  Seated position, supinelying and standing. 4. Patient to demonstrate knowledge of body mechanics in order to decrease stress at lumbar spine and hips. 5. Patient to demonstrate increased balance in tandem stance l/r from 4 seconds to 10 seconds and r/l from 14 seconds to 20 seconds with improved walking, standing balance. Long Term Goals:  Time Frame: 8 weeks  1. Oswestry from 48% to 25% or less. 2. Patient to demonstrate independent in HEP with pain better managed, therap ex with increased strength, balance and mobility with transfers, bed mobility. Patient Education: Need to wait on insurance before scheduling further appointments, Discussed posterior pelvic tilts and abdominal engagement. [x]  HEP/Education Completed: Plan of Care, Goals,    Medbridge Access Code:  []  No new Education completed  []  Reviewed Prior HEP      []  Patient verbalized and/or demonstrated understanding of education provided. []  Patient unable to verbalize and/or demonstrate understanding of education provided. Will continue education. [x]  Barriers to learning: none    PLAN:  Treatment Recommendations: Strengthening, Range of Motion, Balance Training, Functional Mobility Training, Transfer Training, Neuromuscular Re-education, Manual Therapy - Soft Tissue Mobilization, Pain Management, Home Exercise Program, Patient Education, Safety Education and Training, Aquatics and Modalities    [x]  Plan of care initiated. Plan to see patient 2 times per week for 8 weeks to address the treatment planned outlined above.   []  Continue with current plan of care  []  Modify plan of care as follows:    []  Hold pending physician visit  []  Discharge    Time In 1610   Time Out 1652   Timed Code Minutes: 0 min   Total Treatment Time: 42 min       Electronically Signed by: Anjana Spencer, PT

## 2021-08-05 DIAGNOSIS — M79.18 MYOFASCIAL PAIN SYNDROME OF LUMBAR SPINE: ICD-10-CM

## 2021-08-05 RX ORDER — TIZANIDINE 2 MG/1
2-4 TABLET ORAL EVERY 8 HOURS PRN
Qty: 30 TABLET | Refills: 0 | OUTPATIENT
Start: 2021-08-05

## 2021-08-05 RX ORDER — KETOROLAC TROMETHAMINE 10 MG/1
10 TABLET, FILM COATED ORAL EVERY 6 HOURS PRN
Qty: 20 TABLET | Refills: 0 | OUTPATIENT
Start: 2021-08-05 | End: 2022-08-05

## 2021-08-05 NOTE — TELEPHONE ENCOUNTER
Requested Prescriptions     Pending Prescriptions Disp Refills    ketorolac (TORADOL) 10 MG tablet 20 tablet 0     Sig: Take 1 tablet by mouth every 6 hours as needed for Pain    tiZANidine (ZANAFLEX) 2 MG tablet 30 tablet 0     Sig: Take 1-2 tablets by mouth every 8 hours as needed (muscle spasms)       If no call back patient will check with jayda DUMONT around 3 pm today.

## 2021-08-06 RX ORDER — TIZANIDINE 2 MG/1
2-4 TABLET ORAL EVERY 8 HOURS PRN
Qty: 30 TABLET | Refills: 0 | Status: SHIPPED | OUTPATIENT
Start: 2021-08-06 | End: 2022-01-19

## 2021-08-06 NOTE — TELEPHONE ENCOUNTER
Spoke with patient she's 20% better with her pain, she's able to walk with a walker. If no call back patient will check with pharmacy around 3 pm today. Please advise.

## 2021-08-12 DIAGNOSIS — M25.50 POLYARTHRALGIA: ICD-10-CM

## 2021-08-12 RX ORDER — IBUPROFEN 600 MG/1
TABLET ORAL
Qty: 360 TABLET | Refills: 0 | Status: SHIPPED | OUTPATIENT
Start: 2021-08-12 | End: 2021-12-06 | Stop reason: SDUPTHER

## 2021-08-25 ENCOUNTER — HOSPITAL ENCOUNTER (OUTPATIENT)
Dept: PHYSICAL THERAPY | Age: 66
Setting detail: THERAPIES SERIES
Discharge: HOME | End: 2021-08-25
Payer: MEDICARE

## 2021-08-25 PROCEDURE — 97110 THERAPEUTIC EXERCISES: CPT

## 2021-08-25 PROCEDURE — 97124 MASSAGE THERAPY: CPT

## 2021-08-25 NOTE — PROGRESS NOTES
pain for about 1 week. [facility] TREATMENT   Precautions: Flexion responder, decreased core control. /decreased strength hip and abdominal muscles. Pain: 6/10 pain level bilateral lumbosacral    X in shaded column indicates activity completed today   Modalities Parameters/  Location  Notes                     Manual Therapy Time/Technique  Notes   Soft tissue mobilization bilateral lumbosacral region paraspinals 8 minutes x Seated leaning over treatment table with pillow at front of chest               Exercise/Intervention   Notes   Seated stabilization: abdominal engagement for core control  5x  x    Seated core control: ankle df/pf,  x10  x    Seated core control: partial LAQs painfree ranges x10  x    UE: shoulder flexion, shoulder abduction, horizontal abduction/adduction x10  x           hooklying on HP:        Abdominal engagement with isometric 5x  x exale on contraction of abdominals   Posterior pelvic tilt 10x  x    Marches with abominals engaged 10x  x    SAQs 10x  x    Bilateral arm arcs , reciprocal arm arcs  10x  x                                                Specific Interventions Next Treatment: US, soft tissue mobilization with massage/manual therapy bilateral lumbar and left piriformis region. Therap ex for core control, increased awareness of optimal lumbar neutral, stabilization progressing to dynamic stabilization, core strengthening, LE strengthening proximally at hip musculature. Activity/Treatment Tolerance:  []  Patient tolerated treatment well  []  Patient limited by fatigue  [x]  Patient limited by pain   []  Patient limited by medical complications  []  Other:     Assessment: Initiated PT treatment today. Patient initiated in seated core control ex with movement of UE, LE while stabilizing core/trunk. Supine with head of bed inclined: posterior pelvic tilts, marches and SAQs.  All ex tolerated well with patient reporting of less pain. Ease of movement with transfers. Also noted improved standing posture with less forward flexed trunk with standing and walking. Pain level decreased to 3/10    Body Structures/Functions/Activity Limitations: impaired activity tolerance, impaired balance, impaired ROM, impaired strength, pain, abnormal gait and abnormal posture  Prognosis: good    GOALS:  Patient Goal: Patient would like to have less back pain and move better with transfers, walking and standing tolerance    Short Term Goals:  Time Frame: 4 weeks  1. Patient to have decreased pain levels lumbar spine region. Left posterior hip/mid gluteal region from 5-9/10 with standing and walking to 2-6/10.  2. Patient to demonstrate increased ease with mobility with transfers (sit<>stand), bed mobility and negotiating single step with improved strength LEs and core with hip strength left LE 4-/5 to 4/5, right LE from 4/5 to 4+/5,   3. Patient to demonstrate increased core control with awareness of optimal lumbar neutral and increased abdominal strength with increased ease with walking, standing. Tolerate up to 10 reps with a series of ex  Seated position, supinelying and standing. 4. Patient to demonstrate knowledge of body mechanics in order to decrease stress at lumbar spine and hips. 5. Patient to demonstrate increased balance in tandem stance l/r from 4 seconds to 10 seconds and r/l from 14 seconds to 20 seconds with improved walking, standing balance. Long Term Goals:  Time Frame: 8 weeks  1. Oswestry from 48% to 25% or less. 2. Patient to demonstrate independent in HEP with pain better managed, therap ex with increased strength, balance and mobility with transfers, bed mobility. Patient Education: Issued written ex pelvic tilts, SAQs, seated ankle pumps, LAQs. Use of contrast heat and cold as needed.   [x]  HEP/Education Completed: Plan of Care, Goals,    Medbridge Access Code:  []  No new Education completed  [] Reviewed Prior HEP      []  Patient verbalized and/or demonstrated understanding of education provided. []  Patient unable to verbalize and/or demonstrate understanding of education provided. Will continue education. [x]  Barriers to learning: none    PLAN:  Treatment Recommendations: Strengthening, Range of Motion, Balance Training, Functional Mobility Training, Transfer Training, Neuromuscular Re-education, Manual Therapy - Soft Tissue Mobilization, Pain Management, Home Exercise Program, Patient Education, Safety Education and Training, Aquatics and Modalities    []  Plan of care initiated. Plan to see patient 2 times per week for 8 weeks to address the treatment planned outlined above.   [x]  Continue with current plan of care  []  Modify plan of care as follows:    []  Hold pending physician visit  []  Discharge    Time In 1500   Time Out 1543   Timed Code Minutes: 43   Total Treatment Time: 43       Electronically Signed by: Marcelina Herbert, PT

## 2021-08-30 ENCOUNTER — OFFICE VISIT (OUTPATIENT)
Dept: FAMILY MEDICINE CLINIC | Age: 66
End: 2021-08-30
Payer: MEDICARE

## 2021-08-30 VITALS
SYSTOLIC BLOOD PRESSURE: 136 MMHG | HEIGHT: 65 IN | RESPIRATION RATE: 20 BRPM | HEART RATE: 72 BPM | WEIGHT: 265.6 LBS | BODY MASS INDEX: 44.25 KG/M2 | DIASTOLIC BLOOD PRESSURE: 86 MMHG

## 2021-08-30 DIAGNOSIS — E66.01 MORBID OBESITY (HCC): ICD-10-CM

## 2021-08-30 DIAGNOSIS — M79.18 MYOFASCIAL PAIN SYNDROME OF LUMBAR SPINE: ICD-10-CM

## 2021-08-30 DIAGNOSIS — M51.36 DDD (DEGENERATIVE DISC DISEASE), LUMBAR: ICD-10-CM

## 2021-08-30 DIAGNOSIS — I25.10 CORONARY ARTERY DISEASE INVOLVING NATIVE CORONARY ARTERY OF NATIVE HEART WITHOUT ANGINA PECTORIS: ICD-10-CM

## 2021-08-30 DIAGNOSIS — R06.09 DOE (DYSPNEA ON EXERTION): ICD-10-CM

## 2021-08-30 DIAGNOSIS — Z00.00 ROUTINE GENERAL MEDICAL EXAMINATION AT A HEALTH CARE FACILITY: Primary | ICD-10-CM

## 2021-08-30 DIAGNOSIS — E78.5 HYPERLIPIDEMIA LDL GOAL <70: ICD-10-CM

## 2021-08-30 PROCEDURE — 1036F TOBACCO NON-USER: CPT | Performed by: FAMILY MEDICINE

## 2021-08-30 PROCEDURE — G8427 DOCREV CUR MEDS BY ELIG CLIN: HCPCS | Performed by: FAMILY MEDICINE

## 2021-08-30 PROCEDURE — 99213 OFFICE O/P EST LOW 20 MIN: CPT | Performed by: FAMILY MEDICINE

## 2021-08-30 PROCEDURE — 1090F PRES/ABSN URINE INCON ASSESS: CPT | Performed by: FAMILY MEDICINE

## 2021-08-30 PROCEDURE — 1123F ACP DISCUSS/DSCN MKR DOCD: CPT | Performed by: FAMILY MEDICINE

## 2021-08-30 PROCEDURE — 96372 THER/PROPH/DIAG INJ SC/IM: CPT | Performed by: FAMILY MEDICINE

## 2021-08-30 PROCEDURE — 4040F PNEUMOC VAC/ADMIN/RCVD: CPT | Performed by: FAMILY MEDICINE

## 2021-08-30 PROCEDURE — 3017F COLORECTAL CA SCREEN DOC REV: CPT | Performed by: FAMILY MEDICINE

## 2021-08-30 PROCEDURE — G0438 PPPS, INITIAL VISIT: HCPCS | Performed by: FAMILY MEDICINE

## 2021-08-30 PROCEDURE — G8399 PT W/DXA RESULTS DOCUMENT: HCPCS | Performed by: FAMILY MEDICINE

## 2021-08-30 PROCEDURE — G8417 CALC BMI ABV UP PARAM F/U: HCPCS | Performed by: FAMILY MEDICINE

## 2021-08-30 RX ORDER — METHYLPREDNISOLONE ACETATE 40 MG/ML
40 INJECTION, SUSPENSION INTRA-ARTICULAR; INTRALESIONAL; INTRAMUSCULAR; SOFT TISSUE ONCE
Status: COMPLETED | OUTPATIENT
Start: 2021-08-30 | End: 2021-08-30

## 2021-08-30 RX ORDER — METHYLPREDNISOLONE ACETATE 80 MG/ML
80 INJECTION, SUSPENSION INTRA-ARTICULAR; INTRALESIONAL; INTRAMUSCULAR; SOFT TISSUE ONCE
Status: COMPLETED | OUTPATIENT
Start: 2021-08-30 | End: 2021-08-30

## 2021-08-30 RX ADMIN — METHYLPREDNISOLONE ACETATE 40 MG: 40 INJECTION, SUSPENSION INTRA-ARTICULAR; INTRALESIONAL; INTRAMUSCULAR; SOFT TISSUE at 11:10

## 2021-08-30 RX ADMIN — METHYLPREDNISOLONE ACETATE 80 MG: 80 INJECTION, SUSPENSION INTRA-ARTICULAR; INTRALESIONAL; INTRAMUSCULAR; SOFT TISSUE at 11:10

## 2021-08-30 ASSESSMENT — ENCOUNTER SYMPTOMS
GASTROINTESTINAL NEGATIVE: 1
BACK PAIN: 1
RESPIRATORY NEGATIVE: 1

## 2021-08-30 ASSESSMENT — PATIENT HEALTH QUESTIONNAIRE - PHQ9
SUM OF ALL RESPONSES TO PHQ9 QUESTIONS 1 & 2: 0
SUM OF ALL RESPONSES TO PHQ QUESTIONS 1-9: 0
2. FEELING DOWN, DEPRESSED OR HOPELESS: 0
SUM OF ALL RESPONSES TO PHQ QUESTIONS 1-9: 0
SUM OF ALL RESPONSES TO PHQ QUESTIONS 1-9: 0
1. LITTLE INTEREST OR PLEASURE IN DOING THINGS: 0

## 2021-08-30 ASSESSMENT — LIFESTYLE VARIABLES: HOW OFTEN DO YOU HAVE A DRINK CONTAINING ALCOHOL: 0

## 2021-08-30 NOTE — PROGRESS NOTES
Yes Aaron Martin DO   Ascorbic Acid (VITAMIN C PO) Take 1,000 mg by mouth Yes Historical Provider, MD   furosemide (LASIX) 20 MG tablet Take 1 tablet by mouth daily Yes Taryn Corley MD   lisinopril (PRINIVIL;ZESTRIL) 5 MG tablet TAKE 1 TABLET BY MOUTH EVERY DAY Yes Aaron Martin DO   omeprazole (PRILOSEC) 40 MG delayed release capsule TAKE 1 CAPSULE BY MOUTH DAILY WITH BREAKFAST Yes Aaron Martin DO   metoprolol tartrate (LOPRESSOR) 25 MG tablet TAKE ONE-HALF TABLET BY MOUTH TWICE DAILY Yes Aaron Martin DO   valACYclovir (VALTREX) 1 g tablet Take 2 tabs BID for 1 day at onset of cold sore Yes Aaron Martin DO   atorvastatin (LIPITOR) 80 MG tablet Take 1 tablet by mouth daily Yes Taryn Corley MD   Multiple Vitamins-Minerals (PRESERVISION AREDS PO) Take by mouth Yes Historical Provider, MD   aspirin 81 MG EC tablet Take 81 mg by mouth daily Yes Historical Provider, MD   ondansetron (ZOFRAN-ODT) 4 MG disintegrating tablet DISSOLVE 1 TABLET ON THE TONGUE EVERY 8 HOURS AS NEEDED FOR NAUSEA Yes KELIN Camacho - CNP   amitriptyline (ELAVIL) 25 MG tablet TAKE 1 TABLET BY MOUTH EVERY NIGHT Yes Aaron Martin DO   Cyanocobalamin (B-12 PO) Take by mouth daily Yes Historical Provider, MD   POTASSIUM PO Take by mouth Yes Historical Provider, MD   Magnesium 400 MG TABS 100 mg  Yes Historical Provider, MD   Cholecalciferol (VITAMIN D PO) Take by mouth daily  Yes Historical Provider, MD        Allergies   Allergen Reactions    Meloxicam Nausea And Vomiting       Past Medical History:   Diagnosis Date    Arthritis     CAD (coronary artery disease) 3/13/2014    GERD (gastroesophageal reflux disease) 3/13/2014    Hyperlipidemia LDL goal <70 3/13/2014    Hypertension     Hypothyroidism     Irritable bowel syndrome     Stress incontinence (female) (male) 8/29/2018    Ulcer        Past Surgical History:   Procedure Laterality Date    CARDIAC CATHETERIZATION  2008    [facility] TUNNEL RELEASE  2008    CHOLECYSTECTOMY      COLONOSCOPY      HAMMER TOE SURGERY Left     HEEL SPUR SURGERY      HYSTERECTOMY      KNEE SURGERY  2009    TOTAL THYROIDECTOMY      UPPER GASTROINTESTINAL ENDOSCOPY  2009       Social History     Socioeconomic History    Marital status:      Spouse name: Not on file    Number of children: 4    Years of education: 16    Highest education level: Bachelor's degree (e.g., BA, AB, BS)   Occupational History    Not on file   Tobacco Use    Smoking status: Never Smoker    Smokeless tobacco: Never Used   Vaping Use    Vaping Use: Never used   Substance and Sexual Activity    Alcohol use: No    Drug use: No    Sexual activity: Never   Other Topics Concern    Not on file   Social History Narrative    Not on file     Social Determinants of Health     Financial Resource Strain: Low Risk     Difficulty of Paying Living Expenses: Not hard at all   Food Insecurity: No Food Insecurity    Worried About Running Out of Food in the Last Year: Never true    Luis of Food in the Last Year: Never true   Transportation Needs:     Lack of Transportation (Medical):      Lack of Transportation (Non-Medical):    Physical Activity:     Days of Exercise per Week:     Minutes of Exercise per Session:    Stress:     Feeling of Stress :    Social Connections:     Frequency of Communication with Friends and Family:     Frequency of Social Gatherings with Friends and Family:     Attends Jewish Services:     Active Member of Clubs or Organizations:     Attends Club or Organization Meetings:     Marital Status:    Intimate Partner Violence:     Fear of Current or Ex-Partner:     Emotionally Abused:     Physically Abused:     Sexually Abused:         Family History   Problem Relation Age of Onset    Arthritis Mother     Other Mother         over weight    Other Father         over weight    Diabetes Brother     Other Brother         over [place] Heart Attack Brother     Other Maternal Grandmother         over weight    Other Maternal Grandfather         over weight    Other Paternal Grandmother         over weight    Other Paternal Grandfather         over weight    Diabetes Sister     Coronary Art Dis Sister        ADVANCE DIRECTIVE: N, <no information>    Vitals:    08/30/21 1052   BP: 136/86   Site: Left Upper Arm   Position: Sitting   Cuff Size: Large Adult   Pulse: 72   Resp: 20   Weight: 265 lb 9.6 oz (120.5 kg)   Height: 5' 4.5\" (1.638 m)     Estimated body mass index is 44.89 kg/m² as calculated from the following:    Height as of this encounter: 5' 4.5\" (1.638 m). Weight as of this encounter: 265 lb 9.6 oz (120.5 kg). Physical Exam  Vitals and nursing note reviewed. Constitutional:       General: She is not in acute distress. Appearance: Normal appearance. She is well-developed. HENT:      Head: Normocephalic and atraumatic. Right Ear: Tympanic membrane normal.      Left Ear: Tympanic membrane normal.   Eyes:      Conjunctiva/sclera: Conjunctivae normal.   Cardiovascular:      Rate and Rhythm: Normal rate and regular rhythm. Heart sounds: Normal heart sounds. No murmur heard. Pulmonary:      Effort: Pulmonary effort is normal.      Breath sounds: Normal breath sounds. No wheezing, rhonchi or rales. Abdominal:      General: There is no distension. Musculoskeletal:      Cervical back: Neck supple. Lumbar back: Tenderness present. Decreased range of motion. Back:    Skin:     General: Skin is warm and dry. Findings: No rash (on exposed surfaces). Neurological:      General: No focal deficit present. Mental Status: She is alert. Psychiatric:         Attention and Perception: Attention normal.         Mood and Affect: Mood normal.         Speech: Speech normal.         Behavior: Behavior normal. Behavior is cooperative. Thought Content:  Thought content normal.         Judgment: Judgment normal.         No flowsheet data found.     Lab Results   Component Value Date    CHOL 145 06/03/2021    CHOL 221 12/09/2020    CHOL 247 06/03/2019    CHOL 201 10/27/2014    CHOL 221 07/07/2014    CHOL 247 03/13/2014    TRIG 109 06/03/2021    TRIG 83 12/09/2020    TRIG 94 06/03/2019    HDL 50 06/03/2021    HDL 60 12/09/2020    HDL 62 06/03/2019    LDLCALC 73 06/03/2021    LDLCALC 144 12/09/2020    LDLCALC 166 06/03/2019    GLUCOSE 68 07/19/2021    LABA1C 5.1 12/09/2020    LABA1C 5.3 06/03/2019    LABA1C 5.3 01/30/2017       The 10-year ASCVD risk score (Meli Roberto, et al., 2013) is: 9.3%    Values used to calculate the score:      Age: 78 years      Sex: Female      Is Non- : No      Diabetic: No      Tobacco smoker: No      Systolic Blood Pressure: 652 mmHg      Is BP treated: Yes      HDL Cholesterol: 50 mg/dL      Total Cholesterol: 145 mg/dL    Immunization History   Administered Date(s) Administered    Influenza Virus Vaccine 11/03/2014    Influenza, High Dose (Fluzone 65 yrs and older) 11/16/2020    Influenza, Quadv, IM, (6 mo and older Fluzone, Flulaval, Fluarix and 3 yrs and older Afluria) 11/14/2018    Influenza, Triv, inactivated, subunit, adjuvanted, IM (Fluad 65 yrs and older) 09/17/2019    Pneumococcal Conjugate 13-valent (Miriam Ally) 09/17/2019       Health Maintenance   Topic Date Due    COVID-19 Vaccine (1) Never done    DTaP/Tdap/Td vaccine (1 - Tdap) Never done    Shingles Vaccine (1 of 2) Never done   [employer] Visit (AWV)  Never done    Pneumococcal 65+ years Vaccine (2 of 2 - PPSV23) 09/17/2020    Breast cancer screen  06/06/2021    Flu vaccine (1) 09/01/2021    A1C test (Diabetic or Prediabetic)  12/09/2021    TSH testing  12/09/2021    Lipid screen  06/03/2022    Potassium monitoring  07/19/2022    Creatinine monitoring  07/19/2022    Colon cancer screen fecal DNA test (Cologuard)  08/01/2022    DEXA (modify frequency per FRAX score) Completed    Hepatitis C screen  Completed    Hepatitis A vaccine  Aged Out    Hepatitis B vaccine  Aged Out    Hib vaccine  Aged Out    Meningococcal (ACWY) vaccine  Aged Out          ASSESSMENT/PLAN:  1. Routine general medical examination at a health care facility  2. Myofascial pain syndrome of lumbar spine  -     methylPREDNISolone acetate (DEPO-MEDROL) injection 80 mg; 80 mg, IntraMUSCular, ONCE, On Mon 21 at 1130, For 1 dose  -     methylPREDNISolone acetate (DEPO-MEDROL) injection 40 mg; 40 mg, IntraMUSCular, ONCE, On Mon 21 at 1130, For 1 dose  3. DDD (degenerative disc disease), lumbar  4. Coronary artery disease involving native coronary artery of native heart without angina pectoris  5. GOLDEN (dyspnea on exertion)  6. Hyperlipidemia LDL goal <70  7. Morbid obesity ([address]    -  Chronic medical problems stable  -  Labs in Epic reviewed, look fine  -  Continue current medications  -  Follow up with specialists as scheduled  -  Depo 120 mg IM    Return for Medicare Annual Wellness Visit in 1 year. An electronic signature was used to authenticate this note. --Salvador Felipe DO on 2021 at 12:49 PM    Medicare Annual Wellness Visit  Name: Jarrell Crawford Date: 2021   MRN: [id number] Sex: Female   Age: 78 y.o. Ethnicity: Non- / Non    : 1955 Race: White (non-)      Antonio Zheng is here for Medicare AWV and Health Maintenance (needs a pneumonia vaccine)    Screenings for behavioral, psychosocial and functional/safety risks, and cognitive dysfunction are all negative except as indicated below. These results, as well as other patient data from the [address] form, are documented in Flowsheets linked to this Encounter. Allergies   Allergen Reactions    Meloxicam Nausea And Vomiting         Prior to Visit Medications    Medication Sig Taking?  Authorizing Provider   ibuprofen (ADVIL;MOTRIN) 600 MG tablet TAKE 1 TABLET BY MOUTH EVERY 6 HOURS AS NEEDED FOR PAIN Yes Jamie Graham DO   tiZANidine (ZANAFLEX) 2 MG tablet Take 1-2 tablets by mouth every 8 hours as needed (muscle spasms) Yes Jamie Graham DO   Ascorbic Acid (VITAMIN C PO) Take 1,000 mg by mouth Yes Historical Provider, MD   furosemide (LASIX) 20 MG tablet Take 1 tablet by mouth daily Yes Antonina Alfredo MD   lisinopril (PRINIVIL;ZESTRIL) 5 MG tablet TAKE 1 TABLET BY MOUTH EVERY DAY Yes Jamie Graham DO   omeprazole (PRILOSEC) 40 MG delayed release capsule TAKE 1 CAPSULE BY MOUTH DAILY WITH BREAKFAST Yes Jamie Graham DO   metoprolol tartrate (LOPRESSOR) 25 MG tablet TAKE ONE-HALF TABLET BY MOUTH TWICE DAILY Yes Jamie Graham DO   valACYclovir (VALTREX) 1 g tablet Take 2 tabs BID for 1 day at onset of cold sore Yes Jamie Graham DO   atorvastatin (LIPITOR) 80 MG tablet Take 1 tablet by mouth daily Yes Antonina Alfredo MD   Multiple Vitamins-Minerals (PRESERVISION AREDS PO) Take by mouth Yes Historical Provider, MD   aspirin 81 MG EC tablet Take 81 mg by mouth daily Yes Historical Provider, MD   ondansetron (ZOFRAN-ODT) 4 MG disintegrating tablet DISSOLVE 1 TABLET ON THE TONGUE EVERY 8 HOURS AS NEEDED FOR NAUSEA Yes KELIN Cee - CNP   amitriptyline (ELAVIL) 25 MG tablet TAKE 1 TABLET BY MOUTH EVERY NIGHT Yes Jamie Graham DO   Cyanocobalamin (B-12 PO) Take by mouth daily Yes Historical Provider, MD   POTASSIUM PO Take by mouth Yes Historical Provider, MD   Magnesium 400 MG TABS 100 mg  Yes Historical Provider, MD   Cholecalciferol (VITAMIN D PO) Take by mouth daily  Yes Historical Provider, MD         Past Medical History:   Diagnosis Date    Arthritis     CAD (coronary artery disease) 3/13/2014    GERD (gastroesophageal reflux disease) 3/13/2014    Hyperlipidemia LDL goal <70 3/13/2014    Hypertension     Hypothyroidism     Irritable bowel syndrome     Stress incontinence (female) (male) 8/29/2018    Ulcer        Past Interventions:            General Health and ACP:  General  In general, how would you say your health is?: Good  In the past 7 days, have you experienced any of the following?  New or Increased Pain, New or Increased Fatigue, Loneliness, Social Isolation, Stress or Anger?: None of These  Do you get the social and emotional support that you need?: Yes  Do you have a Living Will?: (!) No  Advance Directives     Power [facility] ACP-Advance Directive ACP-Power of     Not on File Not on File Not on File Not on File      General Health Risk Interventions:  · No Living Will: Patient declines ACP discussion/assistance    Health Habits/Nutrition:  Health Habits/Nutrition  Do you exercise for at least 20 minutes 2-3 times per week?: Yes  Have you lost any weight without trying in the past 3 months?: No  Do you eat only one meal per day?: No  Have you seen the dentist within the past year?: Yes  Body mass index: (!) 44.88  Health Habits/Nutrition Interventions:  · Nutritional issues:  educational materials for healthy, well-balanced diet provided    Hearing/Vision:  No exam data present  Hearing/Vision  Do you or your family notice any trouble with your hearing that hasn't been managed with hearing aids?: (!) Yes  Do you have difficulty driving, watching TV, or doing any of your daily activities because of your eyesight?: No  Have you had an eye exam within the past year?: Yes  Hearing/Vision Interventions:  · Hearing concerns:  patient declines any further evaluation/treatment for hearing issues      Personalized Preventive Plan   Current Health Maintenance Status  Immunization History   Administered Date(s) Administered    Influenza Virus Vaccine 11/03/2014    Influenza, High Dose (Fluzone 65 yrs and older) 11/16/2020    Influenza, Quadv, IM, (6 mo and older Fluzone, Flulaval, Fluarix and 3 yrs and older Afluria) 11/14/2018    Influenza, Triv, inactivated, subunit, adjuvanted, IM (Fluad 65 yrs and

## 2021-08-30 NOTE — PATIENT INSTRUCTIONS
You may receive a survey regarding the care you received during your visit. Your input is valuable to us. We encourage you to complete and return your survey. We hope you will choose us in the future for your healthcare needs. Personalized Preventive Plan for [place] - 8/30/2021  Medicare offers a range of preventive health benefits. Some of the tests and screenings are paid in full while other may be subject to a deductible, co-insurance, and/or copay. Some of these benefits include a comprehensive review of your medical history including lifestyle, illnesses that may run in your family, and various assessments and screenings as appropriate. After reviewing your medical record and screening and assessments performed today your provider may have ordered immunizations, labs, imaging, and/or referrals for you. A list of these orders (if applicable) as well as your Preventive Care list are included within your After Visit Summary for your review. Other Preventive Recommendations:    · A preventive eye exam performed by an eye specialist is recommended every 1-2 years to screen for glaucoma; cataracts, macular degeneration, and other eye disorders. · A preventive dental visit is recommended every 6 months. · Try to get at least 150 minutes of exercise per week or 10,000 steps per day on a pedometer . · Order or download the FREE \"Exercise & Physical Activity: Your Everyday Guide\" from [employer]. Call 8-413.485.2216 or search [employer] online. · You need 8836-9875 mg of calcium and 7955-8730 IU of vitamin D per day. It is possible to meet your calcium requirement with diet alone, but a vitamin D supplement is usually necessary to meet this goal.  · When exposed to the sun, use a sunscreen that protects against both UVA and UVB radiation with an SPF of 30 or greater.  Reapply every 2 to 3 hours or after sweating, drying off with a towel, or swimming. · Always wear a seat belt when traveling in a car. Always wear a helmet when riding a bicycle or motorcycle.

## 2021-08-30 NOTE — PROGRESS NOTES
Administrations This Visit     methylPREDNISolone acetate (DEPO-MEDROL) injection 40 mg     Admin Date  08/30/2021  11:10 Action  Given Dose  40 mg Route  IntraMUSCular Site  Dorsogluteal Right Administered By  Juan lAberto Ovalle LPN    Ordering Provider: Gibran Jiménez DO    [facility]: [id number]    Lot#: [id number]    : [employer]. Patient Supplied?: No          methylPREDNISolone acetate (DEPO-MEDROL) injection 80 mg     Admin Date  08/30/2021  11:10 Action  Given Dose  80 mg Route  IntraMUSCular Site  Dorsogluteal Right Administered By  Juan Alberto Ovalle LPN    Ordering Provider: Gibran Jiménez DO    [facility]: [id number]    Lot#: [id number]    : [employer]. Patient Supplied?: No                    Per orders of Dr. Fina Thomas, injection of DepoMedrol 120mg given IM in Right upper quad. gluteus by Cristina Lehman LPN. Patient instructed to report any adverse reaction to me immediately. Patient tolerated well and without incident.

## 2021-09-07 ENCOUNTER — HOSPITAL ENCOUNTER (OUTPATIENT)
Dept: PHYSICAL THERAPY | Age: 66
Setting detail: THERAPIES SERIES
Discharge: HOME | End: 2021-09-07
Payer: MEDICARE

## 2021-09-07 PROCEDURE — 97110 THERAPEUTIC EXERCISES: CPT

## 2021-09-07 NOTE — PROGRESS NOTES
[address] [facility]  PHYSICAL THERAPY  [x] EVALUATION  [] DAILY NOTE (LAND) [x] DAILY NOTE (AQUATIC ) [] PROGRESS NOTE [] DISCHARGE NOTE    [x] [facility] [place]   [] [place] [address]    [] [facility]   [] Carlita Smith    Date: 2021  Patient Name:  Guilherme Marie  : 1955  MRN: [id number]  CSN: [id number]    Referring Practitioner KELIN Valdes -*   Diagnosis Sacroiliitis, not elsewhere classified [M46.1]    Treatment Diagnosis Lumbar pain left>right with decreased functional mobility with standing, walking, negotiating step and transfers. Date of Evaluation 21    Additional Pertinent History Obesity, arthritis, CAD, HTN, stress incontinence, shortness of breath, history of knee pain bilaterally due to OA. Functional Outcome Measure Used Oswestry-modified   Functional Outcome Score 48% (21)       Insurance: Primary: Payor: Jenae Zapata /  /  / ,   Secondary:    Authorization Information: PRE CERTIFICATION REQUIRED: YES, AFTER EVAL WITH CINDY    RECEIVED [address] [facility] PHYSICAL THERAPY  TOTAL OF 16 VISITS  FROM 21 TO 10/30/21  CPT CODES 47554, [id number], 71740, 01762, 56036, 00634  AUTH. [id number]  INSURANCE THERAPY BENEFIT:     AQUATIC THERAPY COVERED: YES  MODALITIES COVERED:  YES, NO IONTO   TELEHEALTH COVERED:    REFERENCE NUMBER:   $40 COPAY PER VSIT    Visit # 3, 3/10 for progress note   Visits Allowed: 16   Recertification Date:    Physician Follow-Up: Follow up with as needed. Physician Orders: PT evaluate and treat   History of Present Illness: Patient reports of years of back pain. States that back pain has gotten worse this past year. Did receive a steroid injection last week. 2 different pain pills from Dr. Kristy Schmidt last week. SUBJECTIVE: Patient missed last appointment due to schedule, another appointment. Today her back pain is 6/10, her pain is a constant presence when up and walking. TREATMENT   Precautions: Flexion responder, decreased core control. /decreased strength hip and abdominal muscles. Pain: 6/10 pain level bilateral lumbosacral    X in shaded column indicates activity completed today   Modalities Parameters/  Location  Notes                     Manual Therapy Time/Technique  Notes   Soft tissue mobilization bilateral lumbosacral region paraspinals 8 minutes  Seated leaning over treatment table with pillow at front of chest               Exercise/Intervention   Notes   Seated stabilization: abdominal engagement for core control  5x  x    Seated core control: ankle df/pf,  x10      Seated core control: partial LAQs painfree ranges x10      UE: shoulder flexion, shoulder abduction, horizontal abduction/adduction x10             hooklying on HP:        Abdominal engagement with isometric  5x  x exale on contraction of abdominals   Posterior pelvic tilt 10x  x    Marches with abominals engaged 10x  x    SAQs 10x 5 sec x    Bilateral arm arcs , reciprocal arm arcs  10x      Clamshell sidelying 10x  x More difficulty R hip          Hooklying stretches:       Piriformis leg crossed pulling knee 3x 30 sec x Tighter R hip, needed towel behind knee    Hip flexor stretch supine off edge of bed 3x 30 sec x                    Specific Interventions Next Treatment: US, soft tissue mobilization with massage/manual therapy bilateral lumbar and left piriformis region. Therap ex for core control, increased awareness of optimal lumbar neutral, stabilization progressing to dynamic stabilization, core strengthening, LE strengthening proximally at hip musculature. Activity/Treatment Tolerance:  []  Patient tolerated treatment well  []  Patient limited by fatigue  [x]  Patient limited by pain   []  Patient limited by medical complications  []  Other:     Assessment: Patient has good understanding of abdominal bracing and even could demonstrate proper breathing to avoid valsalva.  Progressed hip flexibility today and the R hip is much weaker and tighter than the left. With walking she has a R lateral trunk shift during R stance. Back pain was decreased after stretching program.     Body Structures/Functions/Activity Limitations: impaired activity tolerance, impaired balance, impaired ROM, impaired strength, pain, abnormal gait and abnormal posture  Prognosis: good    GOALS:  Patient Goal: Patient would like to have less back pain and move better with transfers, walking and standing tolerance    Short Term Goals:  Time Frame: 4 weeks  1. Patient to have decreased pain levels lumbar spine region. Left posterior hip/mid gluteal region from 5-9/10 with standing and walking to 2-6/10.  2. Patient to demonstrate increased ease with mobility with transfers (sit<>stand), bed mobility and negotiating single step with improved strength LEs and core with hip strength left LE 4-/5 to 4/5, right LE from 4/5 to 4+/5,   3. Patient to demonstrate increased core control with awareness of optimal lumbar neutral and increased abdominal strength with increased ease with walking, standing. Tolerate up to 10 reps with a series of ex  Seated position, supinelying and standing. 4. Patient to demonstrate knowledge of body mechanics in order to decrease stress at lumbar spine and hips. 5. Patient to demonstrate increased balance in tandem stance l/r from 4 seconds to 10 seconds and r/l from 14 seconds to 20 seconds with improved walking, standing balance. Long Term Goals:  Time Frame: 8 weeks  1. Oswestry from 48% to 25% or less. 2. Patient to demonstrate independent in HEP with pain better managed, therap ex with increased strength, balance and mobility with transfers, bed mobility.        Patient Education:   [x]  HEP/Education Completed: handouts with pictures given for posterior tilt, marching, SAQ, seated LAQ, ankle pumps, and piriformis stretch, hip flexor stretch, clamshell   Medbridge Access Code: [email or web address]  []  No new Education completed  []  Reviewed Prior HEP      []  Patient verbalized and/or demonstrated understanding of education provided. []  Patient unable to verbalize and/or demonstrate understanding of education provided. Will continue education. [x]  Barriers to learning: none    PLAN:  Treatment Recommendations: Strengthening, Range of Motion, Balance Training, Functional Mobility Training, Transfer Training, Neuromuscular Re-education, Manual Therapy - Soft Tissue Mobilization, Pain Management, Home Exercise Program, Patient Education, Safety Education and Training, Aquatics and Modalities    []  Plan of care initiated. Plan to see patient 2 times per week for 8 weeks to address the treatment planned outlined above.   [x]  Continue with current plan of care  []  Modify plan of care as follows:    []  Hold pending physician visit  []  Discharge    Time In 1130   Time Out 1210   Timed Code Minutes: 40   Total Treatment Time: 40       Electronically Signed by: Steward Lesch, PT

## 2021-09-08 ENCOUNTER — TELEPHONE (OUTPATIENT)
Dept: FAMILY MEDICINE CLINIC | Age: 66
End: 2021-09-08

## 2021-09-08 RX ORDER — FLUCONAZOLE 150 MG/1
150 TABLET ORAL ONCE
Qty: 2 TABLET | Refills: 0 | Status: SHIPPED | OUTPATIENT
Start: 2021-09-08 | End: 2021-09-08

## 2021-09-08 NOTE — TELEPHONE ENCOUNTER
----- Message from Jennifer Farrell sent at 9/8/2021 10:07 AM EDT -----  Subject: Message to Provider    QUESTIONS  Information for Provider? Patient is calling regarding yeast infection. Patient stating that she is on alot of antibiotic. Patient uncertain if   she wanted to schedule an appointment  ---------------------------------------------------------------------------  --------------  CALL BACK INFO  What is the best way for the office to contact you? OK to leave message on   voicemail  Preferred Call Back Phone Number? [phone]  ---------------------------------------------------------------------------  --------------  SCRIPT ANSWERS  Relationship to Patient?  Self

## 2021-09-10 ENCOUNTER — APPOINTMENT (OUTPATIENT)
Dept: PHYSICAL THERAPY | Age: 66
End: 2021-09-10
Payer: MEDICARE

## 2021-09-13 ENCOUNTER — HOSPITAL ENCOUNTER (OUTPATIENT)
Dept: PHYSICAL THERAPY | Age: 66
Setting detail: THERAPIES SERIES
Discharge: HOME | End: 2021-09-13
Payer: MEDICARE

## 2021-09-13 PROCEDURE — 97110 THERAPEUTIC EXERCISES: CPT

## 2021-09-13 PROCEDURE — 97124 MASSAGE THERAPY: CPT

## 2021-09-13 NOTE — PROGRESS NOTES
Beckie  PHYSICAL THERAPY  [] EVALUATION  [x] DAILY NOTE (LAND) [] DAILY NOTE (AQUATIC ) [] PROGRESS NOTE [] DISCHARGE NOTE    [x] [facility] [place]   [] [place] [address]    [] [facility]   [] Marva Sheets    Date: 2021  Patient Name:  June Martin  : 1955  MRN: [id number]  CSN: [id number]    Referring Practitioner KELIN Hester -*   Diagnosis Sacroiliitis, not elsewhere classified [M46.1]    Treatment Diagnosis Lumbar pain left>right with decreased functional mobility with standing, walking, negotiating step and transfers. Date of Evaluation 21    Additional Pertinent History Obesity, arthritis, CAD, HTN, stress incontinence, shortness of breath, history of knee pain bilaterally due to OA. Functional Outcome Measure Used Oswestry-modified   Functional Outcome Score 48% (21)       Insurance: Primary: Payor: Severiano Randall /  /  / ,   Secondary:    Authorization Information: PRE CERTIFICATION REQUIRED: YES, AFTER EVAL WITH CINDY    RECEIVED [facility] PHYSICAL THERAPY  TOTAL OF 16 VISITS  FROM 21 TO 10/30/21  CPT CODES 83498, [id number], 44817, 76579, 62200, 82757  AUTH. [id number]  INSURANCE THERAPY BENEFIT:     AQUATIC THERAPY COVERED: YES  MODALITIES COVERED:  YES, NO IONTO   TELEHEALTH COVERED:    REFERENCE NUMBER:   $40 COPAY PER VSIT    Visit # 4, 10 for progress note   Visits Allowed: 16   Recertification Date:    Physician Follow-Up: Follow up with as needed. Physician Orders: PT evaluate and treat   History of Present Illness: Patient reports of years of back pain. States that back pain has gotten worse this past year. Did receive a steroid injection last week. 2 different pain pills from Dr. Navjot Kim last week. SUBJECTIVE: Patient reports that pain level is less. Pain level 5/10. Noted did a lot of driving this weekend and noted sit<>stand from prolonged sitting bothers her. TREATMENT   Precautions: Flexion responder, decreased core control. /decreased strength hip and abdominal muscles. Pain: 5/10 pain level bilateral lumbosacral    X in shaded column indicates activity completed today   Modalities Parameters/  Location  Notes                     Manual Therapy Time/Technique  Notes   Soft tissue mobilization bilateral lumbosacral region paraspinals 8 minutes x Seated leaning over treatment table with pillow at front of chest               Exercise/Intervention   Notes   Seated stabilization: abdominal engagement for core control  5x  x    Seated core control: ankle df/pf,  x15  x    Seated core control: partial LAQs painfree ranges x15  x    UE: shoulder flexion, shoulder abduction, horizontal abduction/adduction x15  x    UE seated core: rows, shoulder extension horiz, abduction, ER with resistance band 10x  x    hooklying on HP:        Abdominal engagement with isometric  5x  x exale on contraction of abdominals   Posterior pelvic tilt 10x  x    Marches with abominals engaged 10x  x    Supine: hooklying bent knee opening with band  10x Green band x    Bilateral arm arcs , reciprocal arm arcs  10x  x While abducting against green band   Clamshell sidelying 10x   More difficulty R hip          Hooklying stretches:       Piriformis leg crossed pulling knee 3x 30 sec x  towel behind knee    Hip flexor stretch supine off edge of bed 3x 30 sec                     Specific Interventions Next Treatment: US, soft tissue mobilization with massage/manual therapy bilateral lumbar and left piriformis region. Therap ex for core control, increased awareness of optimal lumbar neutral, stabilization progressing to dynamic stabilization, core strengthening, LE strengthening proximally at hip musculature.      Activity/Treatment Tolerance:  [x]  Patient tolerated treatment well  []  Patient limited by fatigue  []  Patient limited by pain   []  Patient limited by medical complications  [] with increased strength, balance and mobility with transfers, bed mobility. Patient Education:   [x]  HEP/Education Completed: handouts with pictures given for posterior tilt, marching, SAQ, seated LAQ, ankle pumps, and piriformis stretch, hip flexor stretch, clamshell   [employer] Access Code: [email or web address]  []  No new Education completed  []  Reviewed Prior HEP      []  Patient verbalized and/or demonstrated understanding of education provided. []  Patient unable to verbalize and/or demonstrate understanding of education provided. Will continue education. [x]  Barriers to learning: none    PLAN:  Treatment Recommendations: Strengthening, Range of Motion, Balance Training, Functional Mobility Training, Transfer Training, Neuromuscular Re-education, Manual Therapy - Soft Tissue Mobilization, Pain Management, Home Exercise Program, Patient Education, Safety Education and Training, Aquatics and Modalities    []  Plan of care initiated. Plan to see patient 2 times per week for 8 weeks to address the treatment planned outlined above.   [x]  Continue with current plan of care  []  Modify plan of care as follows:    []  Hold pending physician visit  []  Discharge    Time In 1300   Time Out 1338   Timed Code Minutes: 38   Total Treatment Time: 38       Electronically Signed by: Kathrine Arizmendi, PT

## 2021-09-15 ENCOUNTER — APPOINTMENT (OUTPATIENT)
Dept: PHYSICAL THERAPY | Age: 66
End: 2021-09-15
Payer: MEDICARE

## 2021-09-22 ENCOUNTER — APPOINTMENT (OUTPATIENT)
Dept: PHYSICAL THERAPY | Age: 66
End: 2021-09-22
Payer: MEDICARE

## 2021-09-24 ENCOUNTER — HOSPITAL ENCOUNTER (OUTPATIENT)
Dept: PHYSICAL THERAPY | Age: 66
Setting detail: THERAPIES SERIES
Discharge: HOME | End: 2021-09-24
Payer: MEDICARE

## 2021-09-24 PROCEDURE — 97110 THERAPEUTIC EXERCISES: CPT

## 2021-09-24 PROCEDURE — 97140 MANUAL THERAPY 1/> REGIONS: CPT

## 2021-09-24 NOTE — PROGRESS NOTES
[address] [facility]  PHYSICAL THERAPY  [] EVALUATION  [x] DAILY NOTE (LAND) [] DAILY NOTE (AQUATIC ) [] PROGRESS NOTE [] DISCHARGE NOTE    [x] [facility] [place]   [] [place] [address]    [] [facility]    [facility]    Date: 2021  Patient Name:  Franca Reyes  : 1955   MRN: [id number]   CSN: [id number]    Referring Practitioner KELIN Osorio -*   Diagnosis Sacroiliitis, not elsewhere classified [M46.1]    Treatment Diagnosis Lumbar pain left>right with decreased functional mobility with standing, walking, negotiating step and transfers. Date of Evaluation 21    Additional Pertinent History Obesity, arthritis, CAD, HTN, stress incontinence, shortness of breath, history of knee pain bilaterally due to OA. Functional Outcome Measure Used Oswestry-modified   Functional Outcome Score 48% (21)       Insurance: Primary: Payor: Jolynn Agrawal /  /  / ,   Secondary:    Authorization Information: PRE CERTIFICATION REQUIRED: YES, AFTER EVAL WITH CINDY    RECEIVED AUTH FOR PHYSICAL THERAPY  TOTAL OF 16 VISITS  FROM 21 TO 10/30/21  CPT CODES 19460, [address] [facility], [address], [address]41, [address]2233, [address]295  AUTH. [id number]  INSURANCE THERAPY BENEFIT:     AQUATIC THERAPY COVERED: YES  MODALITIES COVERED:  YES, NO IONTO   TELEHEALTH COVERED:    REFERENCE NUMBER:   $40 COPAY PER VSIT    Visit # 5, 5/10 for progress note   Visits Allowed: 16   Recertification Date:    Physician Follow-Up: Follow up with as needed. Physician Orders: PT evaluate and treat   History of Present Illness: Patient reports of years of back pain. States that back pain has gotten worse this past year. Did receive a steroid injection last week. 2 different pain pills from Dr. Guillermo Mixon last week. SUBJECTIVE: Patient reports that she was lifting some heavy boxes at work prior to her appointment. She rates that her pain is higher today due to work.  She rates er current pain a 6-7/10. TREATMENT   Precautions: Flexion responder, decreased core control. /decreased strength hip and abdominal muscles. Pain: 6-7/10 pain level bilateral lumbosacral    X in shaded column indicates activity completed today   Modalities Parameters/  Location  Notes                     Manual Therapy Time/Technique  Notes   Soft tissue mobilization bilateral lumbosacral region paraspinals 8 minutes X Seated leaning over treatment table with pillow at front of chest               Exercise/Intervention   Notes   Seated stabilization: abdominal engagement for core control  5x 5 sec  X    Seated core control: ankle df/pf,  x15  X    Seated core control: partial LAQs painfree ranges x15  X    UE: shoulder flexion, shoulder abduction, horizontal abduction/adduction x15  X Performed all but horizontal abduction. UE seated core: rows, shoulder extension horiz, abduction, ER with resistance band 10x Green  X           hooklying on HP:        Abdominal engagement with isometric  5x 5 sec X exale on contraction of abdominals   Posterior pelvic tilt 10x 5 sec  X    Marches with abominals engaged 10x  X    Supine: hooklying bent knee opening with band  10x Green band X (unilateral, bilateral)    Bilateral arm arcs, reciprocal arm arcs  10x  X While abducting against green band   Clamshell sidelying 10x   More difficulty R hip          Hooklying stretches:       Piriformis leg crossed pulling knee 3x 30 sec X  towel behind knee    Hip flexor stretch supine off edge of bed 3x 30 sec                     Specific Interventions Next Treatment: US, soft tissue mobilization with massage/manual therapy bilateral lumbar and left piriformis region. Therap ex for core control, increased awareness of optimal lumbar neutral, stabilization progressing to dynamic stabilization, core strengthening, LE strengthening proximally at hip musculature.      Activity/Treatment Tolerance:  [x]  Patient tolerated treatment well  []  Patient limited by fatigue  []  Patient limited by pain   []  Patient limited by medical complications  []  Other:     Assessment: Patient continued with exercises as documented above. She was provided with occasional cues on proper technique with exercises to ensure maximal muscle activation. She reported that while performing the piriformis stretch at her LLE today she had some \"cramping\" at her lower back. Continued with manual therapy this session as patient had increased pain. She reported that her pain decreased to a 4/10 at the end of therapy. Body Structures/Functions/Activity Limitations: impaired activity tolerance, impaired balance, impaired ROM, impaired strength, pain, abnormal gait and abnormal posture  Prognosis: good    GOALS:  Patient Goal: Patient would like to have less back pain and move better with transfers, walking and standing tolerance    Short Term Goals:  Time Frame: 4 weeks  1. Patient to have decreased pain levels lumbar spine region. Left posterior hip/mid gluteal region from 5-9/10 with standing and walking to 2-6/10.  2. Patient to demonstrate increased ease with mobility with transfers (sit<>stand), bed mobility and negotiating single step with improved strength LEs and core with hip strength left LE 4-/5 to 4/5, right LE from 4/5 to 4+/5,   3. Patient to demonstrate increased core control with awareness of optimal lumbar neutral and increased abdominal strength with increased ease with walking, standing. Tolerate up to 10 reps with a series of ex  Seated position, supinelying and standing. 4. Patient to demonstrate knowledge of body mechanics in order to decrease stress at lumbar spine and hips. 5. Patient to demonstrate increased balance in tandem stance l/r from 4 seconds to 10 seconds and r/l from 14 seconds to 20 seconds with improved walking, standing balance. Long Term Goals:  Time Frame: 8 weeks  1. Oswestry from 48% to 25% or less.   2. Patient to demonstrate independent in HEP with pain better managed, therap ex with increased strength, balance and mobility with transfers, bed mobility. Patient Education:   [x]  HEP/Education Completed: Continue with current HEP.  [employer] Access Code: [email or web address]  []  No new Education completed  [x]  Reviewed Prior HEP      [x]  Patient verbalized and/or demonstrated understanding of education provided. []  Patient unable to verbalize and/or demonstrate understanding of education provided. Will continue education. [x]  Barriers to learning: none    PLAN:  Treatment Recommendations: Strengthening, Range of Motion, Balance Training, Functional Mobility Training, Transfer Training, Neuromuscular Re-education, Manual Therapy - Soft Tissue Mobilization, Pain Management, Home Exercise Program, Patient Education, Safety Education and Training, Aquatics and Modalities    []  Plan of care initiated. Plan to see patient 2 times per week for 8 weeks to address the treatment planned outlined above.   [x]  Continue with current plan of care  []  Modify plan of care as follows:    []  Hold pending physician visit  []  Discharge    Time In 1346   Time Out 1427   Timed Code Minutes: 41 min   Total Treatment Time: 41 min       Electronically Signed by: Parish Romero PTA

## 2021-09-27 ENCOUNTER — HOSPITAL ENCOUNTER (OUTPATIENT)
Dept: PHYSICAL THERAPY | Age: 66
Setting detail: THERAPIES SERIES
Discharge: HOME | End: 2021-09-27
Payer: MEDICARE

## 2021-09-27 PROCEDURE — 97110 THERAPEUTIC EXERCISES: CPT

## 2021-09-27 NOTE — PROGRESS NOTES
[address] [facility]  PHYSICAL THERAPY  [] EVALUATION  [] DAILY NOTE (LAND) [] DAILY NOTE (AQUATIC ) [x] PROGRESS NOTE [] DISCHARGE NOTE    [x] [facility] [place]   [] [place] [address]    [] [facility]    [] Carlita Smith    Date: 2021  Patient Name:  Guilherme Marie  : 1955   MRN: [id number]   CSN: [id number]    Referring Practitioner KELIN Valdes -*   Diagnosis Sacroiliitis, not elsewhere classified [M46.1]    Treatment Diagnosis Lumbar pain left>right with decreased functional mobility with standing, walking, negotiating step and transfers. Date of Evaluation 21    Additional Pertinent History Obesity, arthritis, CAD, HTN, stress incontinence, shortness of breath, history of knee pain bilaterally due to OA. Functional Outcome Measure Used Oswestry-modified   Functional Outcome Score 48% (21)   36% 21 PN      Insurance: Primary: Payor: Jenae Zapata /  /  / ,   Secondary:    Authorization Information: PRE CERTIFICATION REQUIRED: YES, AFTER EVAL WITH CINDY    RECEIVED AUTH FOR PHYSICAL THERAPY  TOTAL OF 16 VISITS  FROM 21 TO 10/30/21  CPT CODES 04386, [id number], 01788, 57190, 84785, 10427  AUTH. [id number]  INSURANCE THERAPY BENEFIT:     AQUATIC THERAPY COVERED: YES  MODALITIES COVERED:  YES, NO IONTO   TELEHEALTH COVERED:    REFERENCE NUMBER:   $40 COPAY PER VSIT    Visit # 6, 10 for progress note   Visits Allowed: 16   Recertification Date:    Physician Follow-Up: Follow up with as needed. Physician Orders: PT evaluate and treat   History of Present Illness: Patient reports of years of back pain. States that back pain has gotten worse this past year. Did receive a steroid injection last week. 2 different pain pills from Dr. Kristy Schmidt last week. SUBJECTIVE: Patient reports that she has increased energy level and after PT she does not hurt as bad.  Overall better, but noting difficulty stepping up on curb which causes instant pain in her back with this activity. Has been riding recumbant cross  at [facility]. [facility] not been [facility]. PT HEP of ex continues at least 1x per day. TREATMENT   Precautions: Flexion responder, decreased core control. /decreased strength hip and abdominal muscles. Pain: 5/10 pain level bilateral lumbosacral    X in shaded column indicates activity completed today   Modalities Parameters/  Location  Notes   Reassessment   x Refer to goal summary for status               Manual Therapy Time/Technique  Notes   Soft tissue mobilization bilateral lumbosacral region paraspinals 8 minutes  Seated leaning over treatment table with pillow at front of chest               Exercise/Intervention   Notes   Seated stabilization: abdominal engagement for core control  5x 5 sec  x    Seated core control: ankle df/pf,  x15  x    Seated core control: partial LAQs painfree ranges x15  x    UE: shoulder flexion, shoulder abduction, horizontal abduction/adduction x15  x Performed all but horizontal abduction.     UE seated core: rows, shoulder extension horiz, abduction, ER with resistance band 10x orange  x    Seated hamstring stretch right/left  3x 10-15 count x    hooklying on HP:        Abdominal engagement with isometric  5x 5 sec  exale on contraction of abdominals   Posterior pelvic tilt 10x 5 sec      Marches with abominals engaged 10x      Supine: hooklying bent knee opening with band  10x Green band  (unilateral, bilateral)    Bilateral arm arcs, reciprocal arm arcs  10x   While abducting against green band   Clamshell sidelying 10x   More difficulty R hip          Hooklying stretches:       Piriformis leg crossed pulling knee 3x 30 sec   towel behind knee    Hip flexor stretch supine off edge of bed 3x 30 sec                                 Bilateral heelraises  x10  x    Partial wall squat with back against wall  x7  x    3 way hip r/l x10  x Hip flexion only, left knee pain    Balance in tandem stance r/l, l/r  10 count   x Light support of UEs on table                                  Specific Interventions Next Treatment: US, soft tissue mobilization with massage/manual therapy bilateral lumbar and left piriformis region. Therap ex for core control, increased awareness of optimal lumbar neutral, stabilization progressing to dynamic stabilization, core strengthening, LE strengthening proximally at hip musculature. Activity/Treatment Tolerance:  [x]  Patient tolerated treatment well  []  Patient limited by fatigue  []  Patient limited by pain   []  Patient limited by medical complications  []  Other:     Assessment: Reassessment today. Refer to goal summary for status. Noting decreased pain levels,  Improved mobility. Continues to have decreased standing and walking tolerance but better. She does have left knee pain which is also effecting her gait, mobility. Will follow 2x per week for 3 more weeks to address pain management, therap ex, core control, balance and strength. Body Structures/Functions/Activity Limitations: impaired activity tolerance, impaired balance, impaired ROM, impaired strength, pain, abnormal gait and abnormal posture  Prognosis: good    GOALS:  Patient Goal: Patient would like to have less back pain and move better with transfers, walking and standing tolerance. GOAL NOT MET standing tolerance without using cart for support with limited standing time, and at work continues to [employer] on cart. Standing tolerance is about the same as when she started PT. Short Term Goals:  Time Frame: 4 weeks  1. Patient to have decreased pain levels lumbar spine region. Left posterior hip/mid gluteal region from 5-9/10 with standing and walking to 2-6/10. GOAL MET Pain level 5/10 at left posteriorhip, mid gluteal region,     2.  Patient to demonstrate increased ease with mobility with transfers (sit<>stand), bed mobility and negotiating single step with improved strength LEs and core with hip strength left LE 4-/5 to 4/5, right LE from 4/5 to 4+/5,   GOAL NOT MET note decreased strength with sit<>stand transition for need of UE on thighs or on armrests of chait. Unable to complete Single step without decreased strength, pain at low back. 3. Patient to demonstrate increased core control with awareness of optimal lumbar neutral and increased abdominal strength with increased ease with walking, standing. Tolerate up to 10 reps with a series of ex  Seated position, supinelying and standing. GOAL BEING MET Patient has progressed in seated and hooklying in supine program. Need to further progress to standing and core control , balance in this position. 4. Patient to demonstrate knowledge of body mechanics in order to decrease stress at lumbar spine and hips. GOAL NOT MET ongoing instruction. 5. Patient to demonstrate increased balance in tandem stance l/r from 4 seconds to 10 seconds and r/l from 14 seconds to 20 seconds with improved walking, standing balance. GOAL NOT MET Patient can only tandem stance l/r for 4 seconds, and r/l 5 seconds today. Long Term Goals:  Time Frame: 8 weeks  1. Oswestry from 48% to 25% or less. GOAL NOT MET improved to Oswestry of 36%   2. Patient to demonstrate independent in HEP with pain better managed, therap ex with increased strength, balance and mobility with transfers, bed mobility. ONGOING progression in sessions. Patient Education:   [x]  HEP/Education Completed: Continue with current HEP.  [employer] Access Code: [email or web address]  []  No new Education completed  [x]  Reviewed Prior HEP      [x]  Patient verbalized and/or demonstrated understanding of education provided. []  Patient unable to verbalize and/or demonstrate understanding of education provided. Will continue education.   [x]  Barriers to learning: none    PLAN:  Treatment Recommendations: Strengthening, Range of Motion, Balance Training, Functional Mobility Training, Transfer Training, Neuromuscular Re-education, Manual Therapy - Soft Tissue Mobilization, Pain Management, Home Exercise Program, Patient Education, Safety Education and Training, Aquatics and Modalities    []  Plan of care initiated. Plan to see patient 2 times per week for 8 weeks to address the treatment planned outlined above.   [x]  Continue with current plan of care  []  Modify plan of care as follows:    []  Hold pending physician visit  []  Discharge    Time In 1302   Time Out 1338   Timed Code Minutes: 36   Total Treatment Time: 36       Electronically Signed by: Jose Gauthier, PT

## 2021-10-21 DIAGNOSIS — R11.0 NAUSEA: ICD-10-CM

## 2021-10-21 RX ORDER — ONDANSETRON 4 MG/1
TABLET, ORALLY DISINTEGRATING ORAL
Qty: 20 TABLET | Refills: 0 | Status: SHIPPED | OUTPATIENT
Start: 2021-10-21 | End: 2021-12-22 | Stop reason: SDUPTHER

## 2021-12-06 DIAGNOSIS — M25.50 POLYARTHRALGIA: ICD-10-CM

## 2021-12-06 RX ORDER — IBUPROFEN 600 MG/1
TABLET ORAL
Qty: 360 TABLET | Refills: 0 | Status: SHIPPED | OUTPATIENT
Start: 2021-12-06 | End: 2022-04-11

## 2021-12-06 NOTE — TELEPHONE ENCOUNTER
Pt called office requesting a refill of Ibuprofen to [facility]. If no call back she will check with the pharmacy after 3pm. Refill if appropriate.

## 2021-12-22 ENCOUNTER — TELEPHONE (OUTPATIENT)
Dept: FAMILY MEDICINE CLINIC | Age: 66
End: 2021-12-22

## 2021-12-22 DIAGNOSIS — R11.0 NAUSEA: ICD-10-CM

## 2021-12-22 RX ORDER — ONDANSETRON 4 MG/1
TABLET, ORALLY DISINTEGRATING ORAL
Qty: 20 TABLET | Refills: 0 | Status: SHIPPED | OUTPATIENT
Start: 2021-12-22 | End: 2022-05-23

## 2022-01-10 ENCOUNTER — TELEPHONE (OUTPATIENT)
Dept: FAMILY MEDICINE CLINIC | Age: 67
End: 2022-01-10

## 2022-01-10 NOTE — TELEPHONE ENCOUNTER
----- Message from Antonette King sent at 1/10/2022 12:52 PM EST -----  Subject: Message to Provider    QUESTIONS  Information for Provider? Patient has an appointment scheduled on 01/18   for a possible pinched nerve. Patient would like a call to let her know if   there is a medication she can try until the appointment.  ---------------------------------------------------------------------------  --------------  [address]  What is the best way for the office to contact you? OK to leave message on   voicemail  Preferred Call Back Phone Number? [phone]  ---------------------------------------------------------------------------  --------------  SCRIPT ANSWERS  Relationship to Patient?  Self

## 2022-01-11 ENCOUNTER — OFFICE VISIT (OUTPATIENT)
Dept: FAMILY MEDICINE CLINIC | Age: 67
End: 2022-01-11
Payer: MEDICARE

## 2022-01-11 VITALS
BODY MASS INDEX: 44.21 KG/M2 | RESPIRATION RATE: 20 BRPM | SYSTOLIC BLOOD PRESSURE: 136 MMHG | HEART RATE: 80 BPM | WEIGHT: 261.6 LBS | DIASTOLIC BLOOD PRESSURE: 84 MMHG

## 2022-01-11 DIAGNOSIS — M70.62 TROCHANTERIC BURSITIS OF LEFT HIP: Primary | ICD-10-CM

## 2022-01-11 DIAGNOSIS — M54.16 LEFT LUMBAR RADICULITIS: ICD-10-CM

## 2022-01-11 PROCEDURE — G8399 PT W/DXA RESULTS DOCUMENT: HCPCS | Performed by: FAMILY MEDICINE

## 2022-01-11 PROCEDURE — 3017F COLORECTAL CA SCREEN DOC REV: CPT | Performed by: FAMILY MEDICINE

## 2022-01-11 PROCEDURE — G8417 CALC BMI ABV UP PARAM F/U: HCPCS | Performed by: FAMILY MEDICINE

## 2022-01-11 PROCEDURE — 1036F TOBACCO NON-USER: CPT | Performed by: FAMILY MEDICINE

## 2022-01-11 PROCEDURE — 4040F PNEUMOC VAC/ADMIN/RCVD: CPT | Performed by: FAMILY MEDICINE

## 2022-01-11 PROCEDURE — G8484 FLU IMMUNIZE NO ADMIN: HCPCS | Performed by: FAMILY MEDICINE

## 2022-01-11 PROCEDURE — G8427 DOCREV CUR MEDS BY ELIG CLIN: HCPCS | Performed by: FAMILY MEDICINE

## 2022-01-11 PROCEDURE — 1090F PRES/ABSN URINE INCON ASSESS: CPT | Performed by: FAMILY MEDICINE

## 2022-01-11 PROCEDURE — 99213 OFFICE O/P EST LOW 20 MIN: CPT | Performed by: FAMILY MEDICINE

## 2022-01-11 PROCEDURE — 1123F ACP DISCUSS/DSCN MKR DOCD: CPT | Performed by: FAMILY MEDICINE

## 2022-01-11 RX ORDER — METHYLPREDNISOLONE 4 MG/1
TABLET ORAL
Qty: 1 KIT | Refills: 0 | Status: SHIPPED | OUTPATIENT
Start: 2022-01-11 | End: 2022-01-17

## 2022-01-11 ASSESSMENT — PATIENT HEALTH QUESTIONNAIRE - PHQ9
SUM OF ALL RESPONSES TO PHQ QUESTIONS 1-9: 0
2. FEELING DOWN, DEPRESSED OR HOPELESS: 0
SUM OF ALL RESPONSES TO PHQ QUESTIONS 1-9: 0
SUM OF ALL RESPONSES TO PHQ QUESTIONS 1-9: 0
1. LITTLE INTEREST OR PLEASURE IN DOING THINGS: 0
SUM OF ALL RESPONSES TO PHQ QUESTIONS 1-9: 0
SUM OF ALL RESPONSES TO PHQ9 QUESTIONS 1 & 2: 0

## 2022-01-11 ASSESSMENT — ENCOUNTER SYMPTOMS
RESPIRATORY NEGATIVE: 1
GASTROINTESTINAL NEGATIVE: 1

## 2022-01-11 NOTE — PROGRESS NOTES
Garrison Nunez (:  1955) is a 78 y.o. female,Established patient, here for evaluation of the following chief complaint(s):  Leg Pain (left - startes in the left hip and goes down to the ankle)        Subjective   SUBJECTIVE/OBJECTIVE:  HPI:    Chief Complaint   Patient presents with    Leg Pain     left - startes in the left hip and goes down to the ankle     Pt here left hip pain with radiation down the left leg. Only hurts in the morning or when sitting for a while. Pain located lateral left hip but will travel into the foot. Hx of sciatica, denies back pain at this time. Patient Active Problem List   Diagnosis    CAD (coronary artery disease) 2014- mid lad 50 % patent LM< LCX and RCA    Hyperlipidemia LDL goal <70    Hypothyroid    History of total thyroidectomy    GERD (gastroesophageal reflux disease)    PUD (peptic ulcer disease)    IBS (irritable bowel syndrome)    IFG (impaired fasting glucose)    Vitamin D deficiency    SOB (shortness of breath) on exertion    Chest pain    Heart palpitations- intermittent    Abnormal nuclear stress test- anteroior and anteroseptal ischemia    Chest pain, atypical    Abdominal pain    Stress incontinence (female) (male)    Abnormal cardiovascular stress test    GOLDEN (dyspnea on exertion)     Past Surgical History:   Procedure Laterality Date    CARDIAC CATHETERIZATION      CARPAL TUNNEL RELEASE      CHOLECYSTECTOMY      COLONOSCOPY      HAMMER TOE SURGERY Left     HEEL SPUR SURGERY      HYSTERECTOMY      KNEE SURGERY  2009    TOTAL THYROIDECTOMY      UPPER GASTROINTESTINAL ENDOSCOPY  2009     Social History     Tobacco Use    Smoking status: Never Smoker    Smokeless tobacco: Never Used   Vaping Use    Vaping Use: Never used   Substance Use Topics    Alcohol use: No    Drug use: No         Review of Systems   Constitutional: Negative. HENT: Negative. Respiratory: Negative. Cardiovascular: Negative. Gastrointestinal: Negative. Musculoskeletal: Positive for arthralgias (left hip pain) and myalgias (left lower leg pain). All other systems reviewed and are negative. Objective   Physical Exam  Vitals and nursing note reviewed. Constitutional:       General: She is not in acute distress. Appearance: Normal appearance. She is well-developed. HENT:      Head: Normocephalic and atraumatic. Right Ear: Tympanic membrane normal.      Left Ear: Tympanic membrane normal.   Eyes:      Conjunctiva/sclera: Conjunctivae normal.   Cardiovascular:      Rate and Rhythm: Normal rate and regular rhythm. Heart sounds: Normal heart sounds. No murmur heard. Pulmonary:      Effort: Pulmonary effort is normal.      Breath sounds: Normal breath sounds. No wheezing, rhonchi or rales. Abdominal:      General: There is no distension. Musculoskeletal:      Cervical back: Neck supple. Left hip: Tenderness and bony tenderness (left greater trochanter) present. Normal range of motion. Skin:     General: Skin is warm and dry. Findings: No rash (on exposed surfaces). Neurological:      General: No focal deficit present. Mental Status: She is alert. Psychiatric:         Attention and Perception: Attention normal.         Mood and Affect: Mood normal.         Speech: Speech normal.         Behavior: Behavior normal. Behavior is cooperative. Thought Content: Thought content normal.         Judgment: Judgment normal.               ASSESSMENT/PLAN:  1. Trochanteric bursitis of left hip  -     methylPREDNISolone (MEDROL, ALEJANDRA,) 4 MG tablet; As directed, Disp-1 kit, R-0Normal  2. Left lumbar radiculitis    -  Trial MDP, home exercises    Return if symptoms worsen or fail to improve. Injection vs PT at that time. An electronic signature was used to authenticate this note.     --Jihan Loera DO

## 2022-01-11 NOTE — PATIENT INSTRUCTIONS
You may receive a survey regarding the care you received during your visit. Your input is valuable to us. We encourage you to complete and return your survey. We hope you will choose us in the future for your healthcare needs. Patient Education        Hip Bursitis: Exercises  Introduction  Here are some examples of exercises for you to try. The exercises may be suggested for a condition or for rehabilitation. Start each exercise slowly. Ease off the exercises if you start to have pain. You will be told when to start these exercises and which ones will work best for you. How to do the exercises  Hip rotator stretch    1. Lie on your back with both knees bent and your feet flat on the floor. 2. Put the ankle of your affected leg on your opposite thigh near your knee. 3. Use your hand to gently push your knee away from your body until you feel a gentle stretch around your hip. 4. Hold the stretch for 15 to 30 seconds. 5. Repeat 2 to 4 times. 6. Repeat steps 1 through 5, but this time use your hand to gently pull your knee toward your opposite shoulder. Iliotibial band stretch    1. Lean sideways against a wall. If you are not steady on your feet, hold on to a chair or counter. 2. Stand on the leg with the affected hip, with that leg close to the wall. Then cross your other leg in front of it. 3. Let your affected hip drop out to the side of your body and against wall. Then lean away from your affected hip until you feel a stretch. 4. Hold the stretch for 15 to 30 seconds. 5. Repeat 2 to 4 times. Straight-leg raises to the outside    1. Lie on your side, with your affected hip on top. 2. Tighten the front thigh muscles of your top leg to keep your knee straight. 3. Keep your hip and your leg straight in line with the rest of your body, and keep your knee pointing forward. Do not drop your hip back. 4. Lift your top leg straight up toward the ceiling, about 12 inches off the floor.  Hold for about 6 seconds, then slowly lower your leg. 5. Repeat 8 to 12 times. Clamshell    1. Lie on your side, with your affected hip on top and your head propped on a pillow. Keep your feet and knees together and your knees bent. 2. Raise your top knee, but keep your feet together. Do not let your hips roll back. Your legs should open up like a clamshell. 3. Hold for 6 seconds. 4. Slowly lower your knee back down. Rest for 10 seconds. 5. Repeat 8 to 12 times. Follow-up care is a key part of your treatment and safety. Be sure to make and go to all appointments, and call your doctor if you are having problems. It's also a good idea to know your test results and keep a list of the medicines you take. Where can you learn more? Go to [email or web address] and sign in to your [employer] account. Enter P970 in the [employer] box to learn more about \"Hip Bursitis: Exercises. \"     If you do not have an account, please click on the \"Sign Up Now\" link. Current as of: July 1, 2021               Content Version: 13.1  © 5293-0456 Healthwise, Incorporated. Care instructions adapted under license by [facility] ([facility]). If you have questions about a medical condition or this instruction, always ask your healthcare professional. [address]  any warranty or liability for your use of this information.

## 2022-01-17 ENCOUNTER — TELEPHONE (OUTPATIENT)
Dept: FAMILY MEDICINE CLINIC | Age: 67
End: 2022-01-17

## 2022-01-19 ENCOUNTER — OFFICE VISIT (OUTPATIENT)
Dept: FAMILY MEDICINE CLINIC | Age: 67
End: 2022-01-19
Payer: MEDICARE

## 2022-01-19 ENCOUNTER — HOSPITAL ENCOUNTER (OUTPATIENT)
Age: 67
Discharge: HOME | End: 2022-01-19
Payer: MEDICARE

## 2022-01-19 ENCOUNTER — HOSPITAL ENCOUNTER (OUTPATIENT)
Dept: GENERAL RADIOLOGY | Age: 67
Discharge: HOME | End: 2022-01-19
Payer: MEDICARE

## 2022-01-19 VITALS
DIASTOLIC BLOOD PRESSURE: 86 MMHG | SYSTOLIC BLOOD PRESSURE: 132 MMHG | RESPIRATION RATE: 20 BRPM | BODY MASS INDEX: 43.77 KG/M2 | HEART RATE: 80 BPM | WEIGHT: 259 LBS

## 2022-01-19 DIAGNOSIS — G89.29 CHRONIC LEFT HIP PAIN: ICD-10-CM

## 2022-01-19 DIAGNOSIS — M51.36 DDD (DEGENERATIVE DISC DISEASE), LUMBAR: ICD-10-CM

## 2022-01-19 DIAGNOSIS — M25.552 CHRONIC LEFT HIP PAIN: ICD-10-CM

## 2022-01-19 DIAGNOSIS — M70.62 TROCHANTERIC BURSITIS OF LEFT HIP: Primary | ICD-10-CM

## 2022-01-19 DIAGNOSIS — M79.18 PIRIFORMIS MUSCLE PAIN: ICD-10-CM

## 2022-01-19 DIAGNOSIS — M54.16 LEFT LUMBAR RADICULITIS: ICD-10-CM

## 2022-01-19 PROCEDURE — G8417 CALC BMI ABV UP PARAM F/U: HCPCS | Performed by: FAMILY MEDICINE

## 2022-01-19 PROCEDURE — G8484 FLU IMMUNIZE NO ADMIN: HCPCS | Performed by: FAMILY MEDICINE

## 2022-01-19 PROCEDURE — G8427 DOCREV CUR MEDS BY ELIG CLIN: HCPCS | Performed by: FAMILY MEDICINE

## 2022-01-19 PROCEDURE — 72100 X-RAY EXAM L-S SPINE 2/3 VWS: CPT

## 2022-01-19 PROCEDURE — G8399 PT W/DXA RESULTS DOCUMENT: HCPCS | Performed by: FAMILY MEDICINE

## 2022-01-19 PROCEDURE — 4040F PNEUMOC VAC/ADMIN/RCVD: CPT | Performed by: FAMILY MEDICINE

## 2022-01-19 PROCEDURE — 99214 OFFICE O/P EST MOD 30 MIN: CPT | Performed by: FAMILY MEDICINE

## 2022-01-19 PROCEDURE — 73502 X-RAY EXAM HIP UNI 2-3 VIEWS: CPT

## 2022-01-19 PROCEDURE — 1123F ACP DISCUSS/DSCN MKR DOCD: CPT | Performed by: FAMILY MEDICINE

## 2022-01-19 PROCEDURE — 1036F TOBACCO NON-USER: CPT | Performed by: FAMILY MEDICINE

## 2022-01-19 PROCEDURE — 3017F COLORECTAL CA SCREEN DOC REV: CPT | Performed by: FAMILY MEDICINE

## 2022-01-19 PROCEDURE — 96372 THER/PROPH/DIAG INJ SC/IM: CPT | Performed by: FAMILY MEDICINE

## 2022-01-19 PROCEDURE — 1090F PRES/ABSN URINE INCON ASSESS: CPT | Performed by: FAMILY MEDICINE

## 2022-01-19 RX ORDER — METHYLPREDNISOLONE ACETATE 80 MG/ML
80 INJECTION, SUSPENSION INTRA-ARTICULAR; INTRALESIONAL; INTRAMUSCULAR; SOFT TISSUE ONCE
Status: COMPLETED | OUTPATIENT
Start: 2022-01-19 | End: 2022-01-19

## 2022-01-19 RX ORDER — METHYLPREDNISOLONE ACETATE 40 MG/ML
40 INJECTION, SUSPENSION INTRA-ARTICULAR; INTRALESIONAL; INTRAMUSCULAR; SOFT TISSUE ONCE
Status: COMPLETED | OUTPATIENT
Start: 2022-01-19 | End: 2022-01-19

## 2022-01-19 RX ADMIN — METHYLPREDNISOLONE ACETATE 40 MG: 40 INJECTION, SUSPENSION INTRA-ARTICULAR; INTRALESIONAL; INTRAMUSCULAR; SOFT TISSUE at 11:15

## 2022-01-19 RX ADMIN — METHYLPREDNISOLONE ACETATE 80 MG: 80 INJECTION, SUSPENSION INTRA-ARTICULAR; INTRALESIONAL; INTRAMUSCULAR; SOFT TISSUE at 11:15

## 2022-01-19 ASSESSMENT — PATIENT HEALTH QUESTIONNAIRE - PHQ9
SUM OF ALL RESPONSES TO PHQ QUESTIONS 1-9: 0
2. FEELING DOWN, DEPRESSED OR HOPELESS: 0
SUM OF ALL RESPONSES TO PHQ9 QUESTIONS 1 & 2: 0
SUM OF ALL RESPONSES TO PHQ QUESTIONS 1-9: 0
1. LITTLE INTEREST OR PLEASURE IN DOING THINGS: 0

## 2022-01-19 ASSESSMENT — ENCOUNTER SYMPTOMS
BACK PAIN: 1
RESPIRATORY NEGATIVE: 1
GASTROINTESTINAL NEGATIVE: 1

## 2022-01-19 NOTE — PROGRESS NOTES
Administrations This Visit     methylPREDNISolone acetate (DEPO-MEDROL) injection 40 mg     Admin Date  01/19/2022  11:15 Action  Given Dose  40 mg Route  IntraMUSCular Site  Dorsogluteal Left Administered By  Yuridia Hamm LPN    Ordering Provider: Dylon Sorto DO    NDC: 2877-5432-33    Lot#: [id number]    : [address]. Patient Supplied?: No          methylPREDNISolone acetate (DEPO-MEDROL) injection 80 mg     Admin Date  01/19/2022  11:15 Action  Given Dose  80 mg Route  IntraMUSCular Site  Dorsogluteal Left Administered By  Yuridia Hamm LPN    Ordering Provider: Dylon Sorto DO    NDC: 1507-5587-51    Lot#: [id number]    : [employer]    Patient Supplied?: No                  Per orders of Dr. Drew Landau, injection of DepoMedrol 120mg given IM in Left upper quad. gluteus by Tianna Segundo LPN. Patient instructed to report any adverse reaction to me immediately. Patient tolerated well and without incident.

## 2022-01-20 ENCOUNTER — TELEPHONE (OUTPATIENT)
Dept: FAMILY MEDICINE CLINIC | Age: 67
End: 2022-01-20

## 2022-01-20 NOTE — TELEPHONE ENCOUNTER
The steroid shot takes time to work, it's not immediate. PT would be next step, we gave her an order for this at her appt yesterday.

## 2022-01-20 NOTE — TELEPHONE ENCOUNTER
Patient calling to inform Dr. Marj Reeder the injection did not help with her hip pain. Questioning next step. Also requesting a letter with her ideal weight put on it for her weight support group.     Please advise

## 2022-01-20 NOTE — LETTER
[address] [id number]  Phone: [phone]  Fax: 60 Mejiaceasar Rahat Campos DO        January 21, 2022     Patient: Lisa Allen   YOB: 1955   Date of Visit: N/A       To Whom It May Concern:    Chica's ideal body weight per BMI scale would be 145 lbs. If you have any questions or concerns, please don't hesitate to call.     Sincerely,        Shima Lugo DO

## 2022-01-21 NOTE — TELEPHONE ENCOUNTER
Patient notified and understanding voiced. Has appt for therapy scheduled. Would like to  letter for her weight loss support group regarding ideal body weight next week.   Please generate letter

## 2022-01-24 ENCOUNTER — TELEPHONE (OUTPATIENT)
Dept: FAMILY MEDICINE CLINIC | Age: 67
End: 2022-01-24

## 2022-01-24 DIAGNOSIS — M54.16 LEFT LUMBAR RADICULITIS: Primary | ICD-10-CM

## 2022-01-24 RX ORDER — TRAMADOL HYDROCHLORIDE 50 MG/1
50 TABLET ORAL EVERY 8 HOURS PRN
Qty: 21 TABLET | Refills: 0 | Status: SHIPPED | OUTPATIENT
Start: 2022-01-24 | End: 2022-01-31

## 2022-01-24 NOTE — TELEPHONE ENCOUNTER
The patient called in and stated that she was in for left hip/leg pain last week and was given a steroid shot. She stated that it did not help at all in fact the pain is worse now then before the appt. She stated that she had her xrays done and would like the results and wanted to update that she does have her therapy eval tomorrow. She is also asking what else can be done for her pain. Please advise.

## 2022-01-25 ENCOUNTER — TELEPHONE (OUTPATIENT)
Dept: FAMILY MEDICINE CLINIC | Age: 67
End: 2022-01-25

## 2022-01-25 DIAGNOSIS — G89.29 CHRONIC LEFT HIP PAIN: Primary | ICD-10-CM

## 2022-01-25 DIAGNOSIS — M25.552 CHRONIC LEFT HIP PAIN: Primary | ICD-10-CM

## 2022-01-25 NOTE — TELEPHONE ENCOUNTER
Pt was notified, she will stop by to  letter. Pt is ok with the Ortho referral, requests [facility] and is aware that office will contact her to schedule. Please advise.

## 2022-01-25 NOTE — TELEPHONE ENCOUNTER
Pt called office stating she joined a weight support group and needs a letter stating what her ideal weight should be. She would like to pick this up when ready. Please advise. Pt says the pain medication you gave her yesterday did not help at all \"just not cutting it\", the pain is worse today. Pt is requesting a referral to [facility] for eval. Please advise.

## 2022-01-25 NOTE — TELEPHONE ENCOUNTER
Letter was sent to her MyChart on 1/21. Letter has been printed and placed up front. Recommend Ortho eval instead of Neuro if that's ok?

## 2022-01-31 DIAGNOSIS — M54.16 LEFT LUMBAR RADICULITIS: ICD-10-CM

## 2022-01-31 RX ORDER — TRAMADOL HYDROCHLORIDE 50 MG/1
TABLET ORAL
Qty: 21 TABLET | Refills: 0 | Status: SHIPPED | OUTPATIENT
Start: 2022-01-31 | End: 2022-02-07

## 2022-02-09 ENCOUNTER — TELEPHONE (OUTPATIENT)
Dept: FAMILY MEDICINE CLINIC | Age: 67
End: 2022-02-09

## 2022-02-09 RX ORDER — METHYLPREDNISOLONE 4 MG/1
TABLET ORAL
Qty: 1 KIT | Refills: 0 | Status: SHIPPED | OUTPATIENT
Start: 2022-02-09 | End: 2022-02-15

## 2022-02-09 NOTE — TELEPHONE ENCOUNTER
Patient states her trochanteric bursitis of left hip, pain has returned. Depo-Medrol 120 mg given 1/19/22. Can patient get another shot or something for pain. She will be having a lumbar epidural 2/21/22. Patient would like a call back with advice.

## 2022-02-09 NOTE — TELEPHONE ENCOUNTER
Spoke with pt who is scheduled to see Dr. Deana Rodriguez 2/21/22 for the epidural. She agrees the Depo Medrol \"did not work right away, about a week later the pain went away, but then came back\". She is asking if there is anything else you would recommend for the pain until she has her epidural. If not, pt says she is ok with that, but thought she would ask. Please advise.

## 2022-02-09 NOTE — TELEPHONE ENCOUNTER
See TE's. Previous Depo did not help. She was referred to Dr. Zenaida Greenwood for this on 1/25, did she follow up?

## 2022-03-09 ENCOUNTER — HOSPITAL ENCOUNTER (EMERGENCY)
Age: 67
Discharge: HOME | End: 2022-03-09
Attending: EMERGENCY MEDICINE
Payer: OTHER MISCELLANEOUS

## 2022-03-09 VITALS
HEART RATE: 61 BPM | TEMPERATURE: 98.8 F | OXYGEN SATURATION: 95 % | SYSTOLIC BLOOD PRESSURE: 163 MMHG | BODY MASS INDEX: 43.77 KG/M2 | WEIGHT: 259 LBS | RESPIRATION RATE: 18 BRPM | DIASTOLIC BLOOD PRESSURE: 87 MMHG
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
## 2022-03-09 PROCEDURE — 99283 EMERGENCY DEPT VISIT LOW MDM: CPT

## 2022-03-09 RX ORDER — CYCLOBENZAPRINE HCL 10 MG
10 TABLET ORAL NIGHTLY PRN
Qty: 3 TABLET | Refills: 0 | Status: SHIPPED | OUTPATIENT
Start: 2022-03-09 | End: 2022-03-12
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
## 2022-03-10 ENCOUNTER — TELEPHONE (OUTPATIENT)
Dept: FAMILY MEDICINE CLINIC | Age: 67
End: 2022-03-10

## 2022-03-15 ENCOUNTER — HOSPITAL ENCOUNTER (OUTPATIENT)
Dept: GENERAL RADIOLOGY | Age: 67
Discharge: HOME | End: 2022-03-15
Payer: MEDICARE

## 2022-03-15 ENCOUNTER — HOSPITAL ENCOUNTER (OUTPATIENT)
Age: 67
Discharge: HOME | End: 2022-03-15
Payer: MEDICARE

## 2022-03-15 ENCOUNTER — TELEMEDICINE (OUTPATIENT)
Dept: FAMILY MEDICINE CLINIC | Age: 67
End: 2022-03-15
Payer: MEDICARE

## 2022-03-15 DIAGNOSIS — M25.551 ACUTE RIGHT HIP PAIN: ICD-10-CM

## 2022-03-15 DIAGNOSIS — R07.89 CHEST WALL PAIN: ICD-10-CM

## 2022-03-15 DIAGNOSIS — S30.1XXA CONTUSION OF ABDOMINAL WALL, INITIAL ENCOUNTER: ICD-10-CM
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
## 2022-03-15 PROCEDURE — 73502 X-RAY EXAM HIP UNI 2-3 VIEWS: CPT

## 2022-03-15 PROCEDURE — 1090F PRES/ABSN URINE INCON ASSESS: CPT | Performed by: FAMILY MEDICINE

## 2022-03-15 PROCEDURE — 3017F COLORECTAL CA SCREEN DOC REV: CPT | Performed by: FAMILY MEDICINE

## 2022-03-15 PROCEDURE — 99214 OFFICE O/P EST MOD 30 MIN: CPT | Performed by: FAMILY MEDICINE

## 2022-03-15 PROCEDURE — G8399 PT W/DXA RESULTS DOCUMENT: HCPCS | Performed by: FAMILY MEDICINE

## 2022-03-15 PROCEDURE — G8428 CUR MEDS NOT DOCUMENT: HCPCS | Performed by: FAMILY MEDICINE

## 2022-03-15 PROCEDURE — 1123F ACP DISCUSS/DSCN MKR DOCD: CPT | Performed by: FAMILY MEDICINE

## 2022-03-15 PROCEDURE — 4040F PNEUMOC VAC/ADMIN/RCVD: CPT | Performed by: FAMILY MEDICINE

## 2022-03-15 PROCEDURE — 71046 X-RAY EXAM CHEST 2 VIEWS: CPT

## 2022-03-15 RX ORDER — TRAMADOL HYDROCHLORIDE 50 MG/1
50 TABLET ORAL EVERY 8 HOURS PRN
Qty: 15 TABLET | Refills: 0 | Status: SHIPPED | OUTPATIENT
Start: 2022-03-15 | End: 2022-03-20

## 2022-03-15 ASSESSMENT — ENCOUNTER SYMPTOMS
ABDOMINAL PAIN: 1
BLOOD IN STOOL: 0
RESPIRATORY NEGATIVE: 1
BACK PAIN: 1
COLOR CHANGE: 1

## 2022-03-15 NOTE — LETTER
[address] [address]  Phone: [phone]  Fax: 51 Mejiaceasar Rahat Campos DO        March 15, 2022     Patient: Cory Giles   YOB: 1955   Date of Visit: 3/15/2022       To Whom It May Concern: It is my medical opinion that Polo Ewing may return to work on 3/21/22 with no restrictions. Please excuse from 3/9 thru 3/20. If you have any questions or concerns, please don't hesitate to call.     Sincerely,        Camden Mejia DO
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
## 2022-03-21 DIAGNOSIS — M54.16 LEFT LUMBAR RADICULITIS: ICD-10-CM

## 2022-03-21 RX ORDER — TRAMADOL HYDROCHLORIDE 50 MG/1
50 TABLET ORAL DAILY PRN
Qty: 30 TABLET | Refills: 0 | Status: SHIPPED | OUTPATIENT
Start: 2022-03-21 | End: 2022-05-23

## 2022-03-21 NOTE — TELEPHONE ENCOUNTER
----- Message from Ran Horta sent at 3/21/2022 10:55 AM EDT -----  Subject: Refill Request    QUESTIONS  Name of Medication? traMADol (ULTRAM) 50 MG tablet  Patient-reported dosage and instructions? 50 mg, once daily  How many days do you have left? 0  Preferred Pharmacy? [facility] [id number]  Pharmacy phone number (if available)? [phone]  ---------------------------------------------------------------------------  --------------  CALL BACK INFO  What is the best way for the office to contact you? Do not leave any   message, patient will call back for answer  Preferred Call Back Phone Number?  [phone]

## 2022-03-30 ENCOUNTER — TELEPHONE (OUTPATIENT)
Dept: FAMILY MEDICINE CLINIC | Age: 67
End: 2022-03-30

## 2022-03-30 DIAGNOSIS — Z78.0 POSTMENOPAUSAL: Primary | ICD-10-CM

## 2022-03-30 NOTE — TELEPHONE ENCOUNTER
----- Message from Awanda Nyhan sent at 3/30/2022  2:37 PM EDT -----  Subject: Message to Provider    QUESTIONS  Information for Provider? pt would a call back from office in regards to a   new order for mammogram and bone density to be put in.  ---------------------------------------------------------------------------  --------------  CALL BACK INFO  What is the best way for the office to contact you? OK to leave message on   voicemail  Preferred Call Back Phone Number? [phone]  ---------------------------------------------------------------------------  --------------  SCRIPT ANSWERS  Relationship to Patient?  Self

## 2022-03-30 NOTE — TELEPHONE ENCOUNTER
Patient requesting orders for mammogram and Dexa Scan. Aware order in computer from 7/14/21 for mammogram and is still good. She will call scheduling tomorrow and schedule appts.   Please generate order for DEXA Scan

## 2022-04-08 DIAGNOSIS — M25.50 POLYARTHRALGIA: ICD-10-CM

## 2022-04-09 DIAGNOSIS — M54.12 CERVICAL RADICULITIS: ICD-10-CM

## 2022-04-11 RX ORDER — AMITRIPTYLINE HYDROCHLORIDE 25 MG/1
TABLET, FILM COATED ORAL
Qty: 90 TABLET | Refills: 3 | Status: SHIPPED | OUTPATIENT
Start: 2022-04-11

## 2022-04-11 RX ORDER — IBUPROFEN 600 MG/1
TABLET ORAL
Qty: 360 TABLET | Refills: 0 | Status: SHIPPED | OUTPATIENT
Start: 2022-04-11 | End: 2022-05-05

## 2022-04-15 ENCOUNTER — HOSPITAL ENCOUNTER (OUTPATIENT)
Dept: WOMENS IMAGING | Age: 67
Discharge: HOME | End: 2022-04-15
Payer: MEDICARE

## 2022-04-15 DIAGNOSIS — Z78.0 POSTMENOPAUSAL: ICD-10-CM

## 2022-04-15 DIAGNOSIS — Z12.31 ENCOUNTER FOR SCREENING MAMMOGRAM FOR BREAST CANCER: ICD-10-CM

## 2022-04-15 PROCEDURE — 77080 DXA BONE DENSITY AXIAL: CPT

## 2022-04-15 PROCEDURE — 77063 BREAST TOMOSYNTHESIS BI: CPT

## 2022-04-21 DIAGNOSIS — B00.1 COLD SORE: ICD-10-CM

## 2022-04-22 DIAGNOSIS — B00.1 COLD SORE: ICD-10-CM

## 2022-04-22 RX ORDER — FUROSEMIDE 20 MG/1
20 TABLET ORAL DAILY
Qty: 90 TABLET | Refills: 3 | Status: SHIPPED | OUTPATIENT
Start: 2022-04-22 | End: 2022-08-15 | Stop reason: SDUPTHER

## 2022-04-22 RX ORDER — VALACYCLOVIR HYDROCHLORIDE 1 G/1
TABLET, FILM COATED ORAL
Qty: 20 TABLET | Refills: 2 | OUTPATIENT
Start: 2022-04-22

## 2022-04-22 RX ORDER — VALACYCLOVIR HYDROCHLORIDE 1 G/1
TABLET, FILM COATED ORAL
Qty: 20 TABLET | Refills: 2 | Status: SHIPPED | OUTPATIENT
Start: 2022-04-22 | End: 2022-10-19

## 2022-04-22 NOTE — TELEPHONE ENCOUNTER
Alan Golden called requesting a refill of the below medication which has been pended for you:     Requested Prescriptions     Pending Prescriptions Disp Refills    valACYclovir (VALTREX) 1 g tablet 20 tablet 2     Sig: Take 2 tabs BID for 1 day at onset of cold sore       Last Appointment Date: 3/15/2022  Next Appointment Date: Visit date not found    Allergies   Allergen Reactions    Meloxicam Nausea And Vomiting

## 2022-05-03 ENCOUNTER — OFFICE VISIT (OUTPATIENT)
Dept: CARDIOLOGY CLINIC | Age: 67
End: 2022-05-03
Payer: MEDICARE

## 2022-05-03 VITALS
WEIGHT: 274 LBS | BODY MASS INDEX: 48.55 KG/M2 | DIASTOLIC BLOOD PRESSURE: 77 MMHG | HEART RATE: 78 BPM | SYSTOLIC BLOOD PRESSURE: 139 MMHG | HEIGHT: 63 IN

## 2022-05-03 DIAGNOSIS — I50.32 CHRONIC HEART FAILURE WITH PRESERVED EJECTION FRACTION (HCC): Primary | ICD-10-CM

## 2022-05-03 PROCEDURE — G8399 PT W/DXA RESULTS DOCUMENT: HCPCS | Performed by: INTERNAL MEDICINE

## 2022-05-03 PROCEDURE — 4040F PNEUMOC VAC/ADMIN/RCVD: CPT | Performed by: INTERNAL MEDICINE

## 2022-05-03 PROCEDURE — 1123F ACP DISCUSS/DSCN MKR DOCD: CPT | Performed by: INTERNAL MEDICINE

## 2022-05-03 PROCEDURE — G8417 CALC BMI ABV UP PARAM F/U: HCPCS | Performed by: INTERNAL MEDICINE

## 2022-05-03 PROCEDURE — 1036F TOBACCO NON-USER: CPT | Performed by: INTERNAL MEDICINE

## 2022-05-03 PROCEDURE — 99214 OFFICE O/P EST MOD 30 MIN: CPT | Performed by: INTERNAL MEDICINE

## 2022-05-03 PROCEDURE — 1090F PRES/ABSN URINE INCON ASSESS: CPT | Performed by: INTERNAL MEDICINE

## 2022-05-03 PROCEDURE — 3017F COLORECTAL CA SCREEN DOC REV: CPT | Performed by: INTERNAL MEDICINE

## 2022-05-03 PROCEDURE — G8427 DOCREV CUR MEDS BY ELIG CLIN: HCPCS | Performed by: INTERNAL MEDICINE
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
## 2022-05-05 ENCOUNTER — OFFICE VISIT (OUTPATIENT)
Dept: FAMILY MEDICINE CLINIC | Age: 67
End: 2022-05-05
Payer: MEDICARE

## 2022-05-05 VITALS
BODY MASS INDEX: 47.88 KG/M2 | RESPIRATION RATE: 16 BRPM | DIASTOLIC BLOOD PRESSURE: 80 MMHG | WEIGHT: 270.3 LBS | TEMPERATURE: 97.6 F | HEART RATE: 80 BPM | SYSTOLIC BLOOD PRESSURE: 118 MMHG

## 2022-05-05 DIAGNOSIS — R07.81 RIB PAIN ON LEFT SIDE: Primary | ICD-10-CM

## 2022-05-05 PROCEDURE — 99213 OFFICE O/P EST LOW 20 MIN: CPT | Performed by: NURSE PRACTITIONER

## 2022-05-05 PROCEDURE — 1090F PRES/ABSN URINE INCON ASSESS: CPT | Performed by: NURSE PRACTITIONER

## 2022-05-05 PROCEDURE — G8427 DOCREV CUR MEDS BY ELIG CLIN: HCPCS | Performed by: NURSE PRACTITIONER

## 2022-05-05 PROCEDURE — 1123F ACP DISCUSS/DSCN MKR DOCD: CPT | Performed by: NURSE PRACTITIONER

## 2022-05-05 PROCEDURE — 1036F TOBACCO NON-USER: CPT | Performed by: NURSE PRACTITIONER

## 2022-05-05 PROCEDURE — 3017F COLORECTAL CA SCREEN DOC REV: CPT | Performed by: NURSE PRACTITIONER

## 2022-05-05 PROCEDURE — G8399 PT W/DXA RESULTS DOCUMENT: HCPCS | Performed by: NURSE PRACTITIONER

## 2022-05-05 PROCEDURE — G8417 CALC BMI ABV UP PARAM F/U: HCPCS | Performed by: NURSE PRACTITIONER

## 2022-05-05 PROCEDURE — 4040F PNEUMOC VAC/ADMIN/RCVD: CPT | Performed by: NURSE PRACTITIONER

## 2022-05-05 ASSESSMENT — ENCOUNTER SYMPTOMS
VOMITING: 0
CHEST TIGHTNESS: 0
ABDOMINAL PAIN: 0
NAUSEA: 0
SHORTNESS OF BREATH: 0
DIARRHEA: 0
COUGH: 0

## 2022-05-05 NOTE — PROGRESS NOTES
Rebecca Carter (1955) 75 y.o. female here for evaluation of the following chief complaint(s):      HPI:  Chief Complaint   Patient presents with    Abdominal Pain     middle left side discuss treatment          Onset of 2 days with left sided stomach/rib pain. Increase burping. Denies food making it worse or better. Denies N/V. Coughing and sneezing makes it hurt. Twisting makes pain come on. Denies cough. Stopped taking IBU which she was taking regular. On Omeprazole 40 mg.     Vitals:    05/05/22 1403   BP: 118/80   Pulse: 80   Resp: 16   Temp: 97.6 °F (36.4 °C)       Patient Active Problem List   Diagnosis    CAD (coronary artery disease) 7/2014- mid lad 50 % patent LM< LCX and RCA    Hyperlipidemia LDL goal <70    Hypothyroid    History of total thyroidectomy    GERD (gastroesophageal reflux disease)    PUD (peptic ulcer disease)    IBS (irritable bowel syndrome)    IFG (impaired fasting glucose)    Vitamin D deficiency    SOB (shortness of breath) on exertion    Chest pain    Heart palpitations- intermittent    Abnormal nuclear stress test- anteroior and anteroseptal ischemia    Chest pain, atypical    Abdominal pain    Stress incontinence (female) (male)    Abnormal cardiovascular stress test    GOLDEN (dyspnea on exertion)       SUBJECTIVE/OBJECTIVE:  Review of Systems   Constitutional: Negative for activity change, appetite change, chills and fever. HENT: Negative. Negative for congestion. Respiratory: Negative for cough, chest tightness and shortness of breath. Cardiovascular: Negative for chest pain. Gastrointestinal: Negative for abdominal pain, diarrhea, nausea and vomiting. Musculoskeletal: Positive for myalgias. Skin: Negative for rash. Neurological: Negative for dizziness, light-headedness and headaches. Psychiatric/Behavioral: Negative. Physical Exam  Constitutional:       General: She is not in acute distress.   Eyes:      Pupils: Pupils are equal, round, and reactive to light. Cardiovascular:      Rate and Rhythm: Normal rate and regular rhythm. Heart sounds: No murmur heard. Pulmonary:      Effort: Pulmonary effort is normal.      Breath sounds: Normal breath sounds. No wheezing. Chest:       Abdominal:      General: Bowel sounds are normal. There is no distension. Palpations: Abdomen is soft. Tenderness: There is no abdominal tenderness. Musculoskeletal:         General: No tenderness. Normal range of motion. Cervical back: Normal range of motion and neck supple. Skin:     General: Skin is warm and dry. Findings: No rash. ASSESSMENT/PLAN:   Diagnosis Orders   1. Rib pain on left side           MDM: Exam POS for MSK origin   Heating pad and muscle rubs   Continue Tylenol - hold IBU   On PPI   RTO PRN      An electronic signature was used to authenticate this note.     --KELIN West - CNP

## 2022-05-06 ENCOUNTER — HOSPITAL ENCOUNTER (OUTPATIENT)
Dept: CT IMAGING | Age: 67
Discharge: HOME | End: 2022-05-06
Payer: COMMERCIAL

## 2022-05-06 DIAGNOSIS — M54.2 NECK PAIN: ICD-10-CM

## 2022-05-06 DIAGNOSIS — M54.51 VERTEBROGENIC LOW BACK PAIN: ICD-10-CM

## 2022-05-06 PROCEDURE — 72125 CT NECK SPINE W/O DYE: CPT

## 2022-05-06 PROCEDURE — 72131 CT LUMBAR SPINE W/O DYE: CPT

## 2022-05-23 DIAGNOSIS — M54.16 LEFT LUMBAR RADICULITIS: ICD-10-CM

## 2022-05-23 DIAGNOSIS — R11.0 NAUSEA: ICD-10-CM

## 2022-05-23 RX ORDER — ONDANSETRON 4 MG/1
TABLET, ORALLY DISINTEGRATING ORAL
Qty: 20 TABLET | Refills: 0 | Status: SHIPPED | OUTPATIENT
Start: 2022-05-23 | End: 2022-07-25

## 2022-05-23 RX ORDER — TRAMADOL HYDROCHLORIDE 50 MG/1
TABLET ORAL
Qty: 30 TABLET | Refills: 0 | Status: SHIPPED | OUTPATIENT
Start: 2022-05-23 | End: 2022-09-08 | Stop reason: SDUPTHER

## 2022-06-07 ENCOUNTER — TELEPHONE (OUTPATIENT)
Dept: CARDIOLOGY CLINIC | Age: 67
End: 2022-06-07

## 2022-06-07 LAB
ALBUMIN SERPL-MCNC: 4.1 G/DL (ref 3.5–5.2)
ALK PHOSPHATASE: 99 U/L (ref 40–142)
ALT SERPL-CCNC: 17 U/L (ref 5–40)
ANION GAP SERPL CALCULATED.3IONS-SCNC: 8 MEQ/L (ref 10–19)
AST SERPL-CCNC: 21 U/L (ref 9–40)
BILIRUB SERPL-MCNC: 0.4 MG/DL
BUN BLDV-MCNC: 17 MG/DL (ref 8–23)
CALCIUM SERPL-MCNC: 8.8 MG/DL (ref 8.5–10.5)
CHLORIDE BLD-SCNC: 106 MEQ/L (ref 95–107)
CHOLESTEROL/HDL RATIO: 3.4 RATIO
CHOLESTEROL: 212 MG/DL
CO2: 26 MEQ/L (ref 19–31)
CREAT SERPL-MCNC: 0.78 MG/DL (ref 0.6–1.3)
EGFR IF NONAFRICAN AMERICAN: 78 ML/MIN/1.73
GLUCOSE: 84 MG/DL (ref 70–99)
HDLC SERPL-MCNC: 62 MG/DL
LDL CHOLESTEROL CALCULATED: 130 MG/DL
LDL/HDL RATIO: 2.1 RATIO
MAGNESIUM: 1.8 MG/DL (ref 1.6–2.6)
POTASSIUM SERPL-SCNC: 3.8 MEQ/L (ref 3.5–5.4)
SODIUM BLD-SCNC: 140 MEQ/L (ref 133–146)
TOTAL PROTEIN: 6.5 G/DL (ref 6.1–8.3)
TRIGL SERPL-MCNC: 98 MG/DL
VLDLC SERPL CALC-MCNC: 20 MG/DL

## 2022-07-10 DIAGNOSIS — I25.10 CORONARY ARTERY DISEASE INVOLVING NATIVE CORONARY ARTERY OF NATIVE HEART WITHOUT ANGINA PECTORIS: ICD-10-CM

## 2022-07-15 ENCOUNTER — TELEPHONE (OUTPATIENT)
Dept: FAMILY MEDICINE CLINIC | Age: 67
End: 2022-07-15

## 2022-07-15 DIAGNOSIS — Z91.09 ENVIRONMENTAL ALLERGIES: Primary | ICD-10-CM

## 2022-07-15 NOTE — TELEPHONE ENCOUNTER
Pt called office stating she is scheduled to have a hip replacement 8/5/22. Pt really wants to have allergy testing done to see if she is allergic to nickel. Pt says she does not have an allergy to real gold. But if she wears cheap jewelery she breaks out in a rash. Please advise.

## 2022-07-18 NOTE — TELEPHONE ENCOUNTER
Patient notified and she states they already called her and their next opening is not until December. She is requesting a referral to someone else.   Please advise

## 2022-07-22 ENCOUNTER — TELEPHONE (OUTPATIENT)
Dept: CARDIOLOGY CLINIC | Age: 67
End: 2022-07-22

## 2022-07-22 NOTE — TELEPHONE ENCOUNTER
Records request received from [employer] [facility].for last office note, echo and stress test from surgeon's office; records faxed back as per request. No recent stress test, sent cath report instead.     Fax# [phone]   # [phone]

## 2022-07-25 DIAGNOSIS — R11.0 NAUSEA: ICD-10-CM

## 2022-07-25 RX ORDER — ONDANSETRON 4 MG/1
TABLET, ORALLY DISINTEGRATING ORAL
Qty: 20 TABLET | Refills: 0 | Status: SHIPPED | OUTPATIENT
Start: 2022-07-25 | End: 2022-08-15 | Stop reason: SDUPTHER

## 2022-08-01 ENCOUNTER — TELEPHONE (OUTPATIENT)
Dept: FAMILY MEDICINE CLINIC | Age: 67
End: 2022-08-01

## 2022-08-01 DIAGNOSIS — Z20.822 ENCOUNTER FOR LABORATORY TESTING FOR COVID-19 VIRUS: Primary | ICD-10-CM

## 2022-08-01 NOTE — TELEPHONE ENCOUNTER
Pt called office stating she is scheduled to have a hip replacement this Friday. She needs to have a COVID test done this Tuesday at the earliest. Please advise.

## 2022-08-02 ENCOUNTER — HOSPITAL ENCOUNTER (OUTPATIENT)
Age: 67
Discharge: HOME | End: 2022-08-02
Payer: MEDICARE

## 2022-08-02 DIAGNOSIS — Z20.822 ENCOUNTER FOR LABORATORY TESTING FOR COVID-19 VIRUS: ICD-10-CM

## 2022-08-02 LAB
INFLUENZA A: NOT DETECTED
INFLUENZA B: NOT DETECTED
SARS-COV-2 RNA, RT PCR: NOT DETECTED

## 2022-08-02 PROCEDURE — 87636 SARSCOV2 & INF A&B AMP PRB: CPT

## 2022-08-04 ENCOUNTER — TELEPHONE (OUTPATIENT)
Dept: CARDIOLOGY CLINIC | Age: 67
End: 2022-08-04

## 2022-08-04 NOTE — TELEPHONE ENCOUNTER
Patient called and LM on nurse line and I called patient back. She is going to have hip surgery on 8/5/2022 by Dr. Tina Espinal at [place] [place] and wanted to know if Dr. Coburn Deal any concerns with her having surgery. \"  I informed her that Dr. Cherlyn Hodgkin is OOT until Monday and if they are wanting surgical clearance that will have to wait until Monday. Patient states,\"No they weren't asking for clearance, just wanted his thought on me having surgery. \"  I informed patient that if she is wanting Dr. Luis Felipe Parrish input we will have to ask him when he returns. She states no I am going to proceed with surgery. No preop clearance request was faxed to office.

## 2022-08-11 ENCOUNTER — TELEPHONE (OUTPATIENT)
Dept: FAMILY MEDICINE CLINIC | Age: 67
End: 2022-08-11

## 2022-08-11 NOTE — TELEPHONE ENCOUNTER
Myah with [facility] calling and requesting last OV and current medication list be faxed to them for referral they received from [facility]. Aware last OV was 5/5/2022. They will get face to face from [facility] for services.   OV and med list printed and faxed to #[phone]

## 2022-08-12 ENCOUNTER — TELEPHONE (OUTPATIENT)
Dept: FAMILY MEDICINE CLINIC | Age: 67
End: 2022-08-12

## 2022-08-12 RX ORDER — ROPINIROLE 0.25 MG/1
0.25 TABLET, FILM COATED ORAL NIGHTLY
Qty: 30 TABLET | Refills: 0 | Status: SHIPPED | OUTPATIENT
Start: 2022-08-12

## 2022-08-12 RX ORDER — ROPINIROLE 0.25 MG/1
TABLET, FILM COATED ORAL
Qty: 90 TABLET | OUTPATIENT
Start: 2022-08-12

## 2022-08-12 NOTE — TELEPHONE ENCOUNTER
Patient s/p right hip replacement on 8/5/22 and c/o restless legs at night that are keeping her from sleeping. Requesting medication to help with this. Has attempted to contact surgeon x 5 days without a phone call back per patient. Please advise. Pharmacy is [facility].   Patient will check with pharmacy after 1pm

## 2022-08-15 DIAGNOSIS — M54.16 LEFT LUMBAR RADICULITIS: ICD-10-CM

## 2022-08-15 DIAGNOSIS — M25.50 POLYARTHRALGIA: ICD-10-CM

## 2022-08-15 DIAGNOSIS — R11.0 NAUSEA: ICD-10-CM

## 2022-08-15 DIAGNOSIS — I25.10 CORONARY ARTERY DISEASE INVOLVING NATIVE CORONARY ARTERY OF NATIVE HEART WITHOUT ANGINA PECTORIS: ICD-10-CM

## 2022-08-15 RX ORDER — TRAMADOL HYDROCHLORIDE 50 MG/1
50 TABLET ORAL DAILY PRN
Qty: 30 TABLET | Refills: 0 | Status: SHIPPED | OUTPATIENT
Start: 2022-08-15 | End: 2022-09-14

## 2022-08-15 RX ORDER — IBUPROFEN 600 MG/1
TABLET ORAL
Qty: 360 TABLET | Refills: 0 | Status: SHIPPED | OUTPATIENT
Start: 2022-08-15 | End: 2022-10-19

## 2022-08-15 RX ORDER — ONDANSETRON 4 MG/1
TABLET, ORALLY DISINTEGRATING ORAL
Qty: 20 TABLET | Refills: 0 | Status: SHIPPED | OUTPATIENT
Start: 2022-08-15 | End: 2022-10-31 | Stop reason: SDUPTHER

## 2022-08-15 RX ORDER — FUROSEMIDE 20 MG/1
20 TABLET ORAL DAILY
Qty: 90 TABLET | Refills: 0 | Status: SHIPPED | OUTPATIENT
Start: 2022-08-15 | End: 2022-08-19 | Stop reason: SDUPTHER

## 2022-08-15 NOTE — TELEPHONE ENCOUNTER
Fax received from pharmacy for refill of Tramadol, Metoprolol, Ibuprofen, and Zofran . Please refill if appropriate.

## 2022-08-19 RX ORDER — FUROSEMIDE 20 MG/1
20 TABLET ORAL DAILY
Qty: 90 TABLET | Refills: 3 | Status: SHIPPED | OUTPATIENT
Start: 2022-08-19

## 2022-08-19 NOTE — TELEPHONE ENCOUNTER
[facility] calling in for patient asking for a refill of the furosemide. Pharmacy was not able to verify the dosage. Please advise if ok for refills to mail order pharmacy.

## 2022-08-30 RX ORDER — ATORVASTATIN CALCIUM 80 MG/1
80 TABLET, FILM COATED ORAL DAILY
Qty: 90 TABLET | Refills: 1 | Status: SHIPPED | OUTPATIENT
Start: 2022-08-30

## 2022-09-07 ENCOUNTER — TELEPHONE (OUTPATIENT)
Dept: FAMILY MEDICINE CLINIC | Age: 67
End: 2022-09-07

## 2022-09-07 RX ORDER — FLUCONAZOLE 150 MG/1
150 TABLET ORAL ONCE
Qty: 2 TABLET | Refills: 0 | Status: SHIPPED | OUTPATIENT
Start: 2022-09-07 | End: 2022-09-07

## 2022-09-07 NOTE — TELEPHONE ENCOUNTER
----- Message from Solo Gross, [address] sent at 9/7/2022  1:31 PM EDT -----  Subject: Message to Provider    QUESTIONS  Information for Provider? Pt called and stated that she had a hip   replacement 08/05/2022. Pt states that they thought her incision was   infected so she was put on really strong antibiotics. Pt states that she   now has a yeast infection and would like to now if the  could send in a   RX for her. Please call the pt back and inform if a RX was sent in.   ---------------------------------------------------------------------------  --------------  [id number]  [phone]; OK to leave message on voicemail  ---------------------------------------------------------------------------  --------------  SCRIPT ANSWERS  Relationship to Patient?  Self

## 2022-09-08 ENCOUNTER — OFFICE VISIT (OUTPATIENT)
Dept: FAMILY MEDICINE CLINIC | Age: 67
End: 2022-09-08
Payer: MEDICARE

## 2022-09-08 VITALS
HEART RATE: 68 BPM | SYSTOLIC BLOOD PRESSURE: 130 MMHG | WEIGHT: 275.9 LBS | BODY MASS INDEX: 48.87 KG/M2 | RESPIRATION RATE: 16 BRPM | DIASTOLIC BLOOD PRESSURE: 74 MMHG

## 2022-09-08 DIAGNOSIS — R23.9 MACERATION OF PERIWOUND SKIN: Primary | ICD-10-CM

## 2022-09-08 DIAGNOSIS — T81.89XA NON-HEALING SURGICAL WOUND, INITIAL ENCOUNTER: ICD-10-CM

## 2022-09-08 DIAGNOSIS — Z96.641 S/P HIP REPLACEMENT, RIGHT: ICD-10-CM

## 2022-09-08 PROCEDURE — 3017F COLORECTAL CA SCREEN DOC REV: CPT | Performed by: NURSE PRACTITIONER

## 2022-09-08 PROCEDURE — 99213 OFFICE O/P EST LOW 20 MIN: CPT | Performed by: NURSE PRACTITIONER

## 2022-09-08 PROCEDURE — G8417 CALC BMI ABV UP PARAM F/U: HCPCS | Performed by: NURSE PRACTITIONER

## 2022-09-08 PROCEDURE — G8427 DOCREV CUR MEDS BY ELIG CLIN: HCPCS | Performed by: NURSE PRACTITIONER

## 2022-09-08 PROCEDURE — 1036F TOBACCO NON-USER: CPT | Performed by: NURSE PRACTITIONER

## 2022-09-08 PROCEDURE — G8399 PT W/DXA RESULTS DOCUMENT: HCPCS | Performed by: NURSE PRACTITIONER

## 2022-09-08 PROCEDURE — 1123F ACP DISCUSS/DSCN MKR DOCD: CPT | Performed by: NURSE PRACTITIONER

## 2022-09-08 PROCEDURE — 1090F PRES/ABSN URINE INCON ASSESS: CPT | Performed by: NURSE PRACTITIONER

## 2022-09-08 RX ORDER — TRAMADOL HYDROCHLORIDE 50 MG/1
50 TABLET ORAL EVERY 8 HOURS PRN
Qty: 21 TABLET | Refills: 0 | Status: SHIPPED | OUTPATIENT
Start: 2022-09-08 | End: 2022-09-15

## 2022-09-08 SDOH — ECONOMIC STABILITY: FOOD INSECURITY: WITHIN THE PAST 12 MONTHS, YOU WORRIED THAT YOUR FOOD WOULD RUN OUT BEFORE YOU GOT MONEY TO BUY MORE.: NEVER TRUE

## 2022-09-08 SDOH — ECONOMIC STABILITY: FOOD INSECURITY: WITHIN THE PAST 12 MONTHS, THE FOOD YOU BOUGHT JUST DIDN'T LAST AND YOU DIDN'T HAVE MONEY TO GET MORE.: NEVER TRUE

## 2022-09-08 ASSESSMENT — ENCOUNTER SYMPTOMS: RESPIRATORY NEGATIVE: 1

## 2022-09-08 ASSESSMENT — SOCIAL DETERMINANTS OF HEALTH (SDOH): HOW HARD IS IT FOR YOU TO PAY FOR THE VERY BASICS LIKE FOOD, HOUSING, MEDICAL CARE, AND HEATING?: NOT HARD AT ALL

## 2022-09-08 NOTE — PROGRESS NOTES
Aric Delatorre (1955) 75 y.o. female here for evaluation of the following chief complaint(s):      HPI:  Chief Complaint   Patient presents with    Wound Infection     right hip area s/p right hip replacement on 8/5/22 with Dr Mathur in [facility], finished ATB 1 week ago. Using Dakins \"to dry up wound\"       THR 8/5/22 with ORTHO in [facility]. Sanction of surgical wound not healing. Was last seen 2 weeks ago with ORTHO. Was given ATB and clean wound with Dankins TID. No redness or swelling. No active drainage. Seems to be increasing in size. Next follow up is on 9/16/22    Vitals:    09/08/22 1024   BP: 130/74   Pulse: 68   Resp: 16       Patient Active Problem List   Diagnosis    CAD (coronary artery disease) 7/2014- mid lad 50 % patent LM< LCX and RCA    Hyperlipidemia LDL goal <70    Hypothyroid    History of total thyroidectomy    GERD (gastroesophageal reflux disease)    PUD (peptic ulcer disease)    IBS (irritable bowel syndrome)    IFG (impaired fasting glucose)    Vitamin D deficiency    SOB (shortness of breath) on exertion    Chest pain    Heart palpitations- intermittent    Abnormal nuclear stress test- anteroior and anteroseptal ischemia    Chest pain, atypical    Abdominal pain    Stress incontinence (female) (male)    Abnormal cardiovascular stress test    GOLDEN (dyspnea on exertion)       SUBJECTIVE/OBJECTIVE:  Review of Systems   Constitutional: Negative. Respiratory: Negative. Cardiovascular: Negative. Musculoskeletal:  Positive for arthralgias. Skin:  Positive for wound. Physical Exam  Constitutional:       General: She is not in acute distress. Appearance: She is not ill-appearing. Cardiovascular:      Rate and Rhythm: Normal rate and regular rhythm. Pulses: Normal pulses. Heart sounds: Normal heart sounds. Pulmonary:      Effort: Pulmonary effort is normal.      Breath sounds: Normal breath sounds.    Musculoskeletal: Legs:            ASSESSMENT/PLAN:   Diagnosis Orders   1. Maceration of periwound skin  [facility]      2. Left lumbar radiculitis  traMADol (ULTRAM) 50 MG tablet      3. Non-healing surgical wound, initial encounter  [facility]            MDM: Set up with Wound Clinic   Continue current wound care   Hold on ATB for now   Follow up with ORTHO in 1 week   RTO PRN         An electronic signature was used to authenticate this note.     --Mauri Mohs, KELIN - CNP

## 2022-09-12 ENCOUNTER — TELEPHONE (OUTPATIENT)
Dept: WOUND CARE | Age: 67
End: 2022-09-12

## 2022-09-12 NOTE — TELEPHONE ENCOUNTER
----- Message from Justin Olivera sent at 9/12/2022 11:30 AM EDT -----  Faraz Thomason WANTS TO CXL HER THUR 9/15 APPT AND DOES NOT WANT TO R/S AT THIS TIME

## 2022-09-13 ENCOUNTER — TELEPHONE (OUTPATIENT)
Dept: FAMILY MEDICINE CLINIC | Age: 67
End: 2022-09-13

## 2022-09-13 DIAGNOSIS — Z20.822 ENCOUNTER FOR LABORATORY TESTING FOR COVID-19 VIRUS: Primary | ICD-10-CM

## 2022-09-13 NOTE — TELEPHONE ENCOUNTER
Pt called office stating she is scheduled for surgery this Monday, \"they have to go back in where they did surgery on my hip\". Pt needs a COVID test this Friday prior to surgery. Please advise.

## 2022-09-16 ENCOUNTER — HOSPITAL ENCOUNTER (OUTPATIENT)
Age: 67
Discharge: HOME | End: 2022-09-16
Payer: MEDICARE

## 2022-09-16 LAB
INFLUENZA A: NOT DETECTED
INFLUENZA B: NOT DETECTED
SARS-COV-2 RNA, RT PCR: NOT DETECTED

## 2022-09-16 PROCEDURE — 87636 SARSCOV2 & INF A&B AMP PRB: CPT

## 2022-10-19 ENCOUNTER — OFFICE VISIT (OUTPATIENT)
Dept: FAMILY MEDICINE CLINIC | Age: 67
End: 2022-10-19
Payer: MEDICARE

## 2022-10-19 VITALS
TEMPERATURE: 99.5 F | HEART RATE: 84 BPM | SYSTOLIC BLOOD PRESSURE: 126 MMHG | WEIGHT: 273.8 LBS | DIASTOLIC BLOOD PRESSURE: 82 MMHG | RESPIRATION RATE: 24 BRPM | BODY MASS INDEX: 48.5 KG/M2

## 2022-10-19 DIAGNOSIS — U07.1 COVID-19: Primary | ICD-10-CM

## 2022-10-19 DIAGNOSIS — R68.89 FLU-LIKE SYMPTOMS: ICD-10-CM

## 2022-10-19 PROBLEM — M16.12 ARTHRITIS OF LEFT HIP: Status: ACTIVE | Noted: 2022-10-19

## 2022-10-19 PROBLEM — M51.37 DEGENERATION OF LUMBOSACRAL INTERVERTEBRAL DISC: Status: ACTIVE | Noted: 2022-10-19

## 2022-10-19 PROBLEM — M70.61 TROCHANTERIC BURSITIS OF RIGHT HIP: Status: ACTIVE | Noted: 2022-10-19

## 2022-10-19 PROBLEM — M51.379 DEGENERATION OF LUMBOSACRAL INTERVERTEBRAL DISC: Status: ACTIVE | Noted: 2022-10-19

## 2022-10-19 PROBLEM — M16.11 UNILATERAL PRIMARY OSTEOARTHRITIS, RIGHT HIP: Status: ACTIVE | Noted: 2022-08-05

## 2022-10-19 PROBLEM — R52 PAIN, UNSPECIFIED: Status: ACTIVE | Noted: 2022-08-05

## 2022-10-19 PROBLEM — B99.9 UNSPECIFIED INFECTIOUS DISEASE: Status: ACTIVE | Noted: 2022-09-19

## 2022-10-19 LAB
Lab: ABNORMAL
QC PASS/FAIL: ABNORMAL
SARS-COV-2 RDRP RESP QL NAA+PROBE: POSITIVE

## 2022-10-19 PROCEDURE — 1123F ACP DISCUSS/DSCN MKR DOCD: CPT | Performed by: NURSE PRACTITIONER

## 2022-10-19 PROCEDURE — G8427 DOCREV CUR MEDS BY ELIG CLIN: HCPCS | Performed by: NURSE PRACTITIONER

## 2022-10-19 PROCEDURE — G8399 PT W/DXA RESULTS DOCUMENT: HCPCS | Performed by: NURSE PRACTITIONER

## 2022-10-19 PROCEDURE — G8484 FLU IMMUNIZE NO ADMIN: HCPCS | Performed by: NURSE PRACTITIONER

## 2022-10-19 PROCEDURE — 99213 OFFICE O/P EST LOW 20 MIN: CPT | Performed by: NURSE PRACTITIONER

## 2022-10-19 PROCEDURE — 87635 SARS-COV-2 COVID-19 AMP PRB: CPT | Performed by: NURSE PRACTITIONER

## 2022-10-19 PROCEDURE — 1036F TOBACCO NON-USER: CPT | Performed by: NURSE PRACTITIONER

## 2022-10-19 PROCEDURE — G8417 CALC BMI ABV UP PARAM F/U: HCPCS | Performed by: NURSE PRACTITIONER

## 2022-10-19 PROCEDURE — 3017F COLORECTAL CA SCREEN DOC REV: CPT | Performed by: NURSE PRACTITIONER

## 2022-10-19 PROCEDURE — 1090F PRES/ABSN URINE INCON ASSESS: CPT | Performed by: NURSE PRACTITIONER

## 2022-10-19 RX ORDER — NIRMATRELVIR AND RITONAVIR 300-100 MG
KIT ORAL
Qty: 30 TABLET | Refills: 0 | Status: CANCELLED | OUTPATIENT
Start: 2022-10-19 | End: 2022-10-24

## 2022-10-19 RX ORDER — TRAMADOL HYDROCHLORIDE 50 MG/1
1 TABLET ORAL EVERY 6 HOURS PRN
COMMUNITY
Start: 2022-09-20

## 2022-10-19 ASSESSMENT — ENCOUNTER SYMPTOMS
ABDOMINAL PAIN: 0
SHORTNESS OF BREATH: 0
SINUS PRESSURE: 1
COUGH: 1
NAUSEA: 0
RHINORRHEA: 1

## 2022-10-27 ENCOUNTER — OFFICE VISIT (OUTPATIENT)
Dept: CARDIOLOGY CLINIC | Age: 67
End: 2022-10-27
Payer: MEDICARE

## 2022-10-27 VITALS
HEIGHT: 63 IN | WEIGHT: 272.25 LBS | DIASTOLIC BLOOD PRESSURE: 80 MMHG | HEART RATE: 75 BPM | SYSTOLIC BLOOD PRESSURE: 114 MMHG | BODY MASS INDEX: 48.24 KG/M2

## 2022-10-27 DIAGNOSIS — R06.02 SOB (SHORTNESS OF BREATH) ON EXERTION: ICD-10-CM

## 2022-10-27 DIAGNOSIS — I50.32 CHRONIC HEART FAILURE WITH PRESERVED EJECTION FRACTION (HCC): Primary | ICD-10-CM

## 2022-10-27 DIAGNOSIS — I25.10 CORONARY ARTERY DISEASE INVOLVING NATIVE CORONARY ARTERY OF NATIVE HEART WITHOUT ANGINA PECTORIS: ICD-10-CM

## 2022-10-27 PROCEDURE — 3017F COLORECTAL CA SCREEN DOC REV: CPT | Performed by: INTERNAL MEDICINE

## 2022-10-27 PROCEDURE — G8484 FLU IMMUNIZE NO ADMIN: HCPCS | Performed by: INTERNAL MEDICINE

## 2022-10-27 PROCEDURE — G8427 DOCREV CUR MEDS BY ELIG CLIN: HCPCS | Performed by: INTERNAL MEDICINE

## 2022-10-27 PROCEDURE — G8399 PT W/DXA RESULTS DOCUMENT: HCPCS | Performed by: INTERNAL MEDICINE

## 2022-10-27 PROCEDURE — 1123F ACP DISCUSS/DSCN MKR DOCD: CPT | Performed by: INTERNAL MEDICINE

## 2022-10-27 PROCEDURE — 99214 OFFICE O/P EST MOD 30 MIN: CPT | Performed by: INTERNAL MEDICINE

## 2022-10-27 PROCEDURE — 1090F PRES/ABSN URINE INCON ASSESS: CPT | Performed by: INTERNAL MEDICINE

## 2022-10-27 PROCEDURE — 93000 ELECTROCARDIOGRAM COMPLETE: CPT | Performed by: INTERNAL MEDICINE

## 2022-10-27 PROCEDURE — G8417 CALC BMI ABV UP PARAM F/U: HCPCS | Performed by: INTERNAL MEDICINE

## 2022-10-27 PROCEDURE — 1036F TOBACCO NON-USER: CPT | Performed by: INTERNAL MEDICINE

## 2022-10-27 RX ORDER — IBUPROFEN 600 MG/1
TABLET ORAL
Qty: 360 TABLET | Refills: 0 | Status: SHIPPED | OUTPATIENT
Start: 2022-10-27

## 2022-10-27 NOTE — PROGRESS NOTES
[address] [facility] [address]  [facility] [address]  Dept: [phone]  Dept Fax: [phone]  Loc: [phone]    Visit Date: 10/27/2022    Ms. Carlos Day is a 75 y.o. female  who presented for:  CAD  HFpEF  HPI:   RAVI Day is a pleasant 75year old female patient who  has a past medical history of Arthritis, CAD (coronary artery disease), Degeneration of lumbosacral intervertebral disc, GERD (gastroesophageal reflux disease), Hyperlipidemia LDL goal <70, Hypertension, Hypothyroidism, Irritable bowel syndrome, Stress incontinence (female) (male), and Ulcer. Her FH is significant for CAD, CHF, patient's brother  from [place]. The patient has h/o mild cardiomyopathy. Echocardiogram in  revealed an EF of 45-50%. [address] [place] [address] 2021 revealed a 50-60% mLAD stenosis, FFR 0.85. LVEDP was 28 mmHg, lasix was added. Echo [id number] revealed an EF of 60%. Patient denies chest pain, shortness of breath, dyspnea on exertion, orthopnea, paroxysmal nocturnal dyspnea, palpitations, dizziness, syncope, weight gain or leg swelling. The patient underwent right hip surgery, no cardiac complications. Feels better, now more active. She states that left knee surgery is being considered. Current Outpatient Medications:      MG tablet, TAKE 1 TABLET EVERY 6 HOURS AS NEEDED FOR PAIN, Disp: 360 tablet, Rfl: 0    rivaroxaban (XARELTO) 10 MG TABS tablet, Take 10 mg by mouth daily, Disp: , Rfl:     traMADol (ULTRAM) 50 MG tablet, Take 1 tablet by mouth every 6 hours as needed. , Disp: , Rfl:     atorvastatin (LIPITOR) 80 MG tablet, TAKE 1 TABLET BY MOUTH DAILY, Disp: 90 tablet, Rfl: 1    furosemide (LASIX) 20 MG tablet, Take 1 tablet by mouth daily, Disp: 90 tablet, Rfl: 3    metoprolol tartrate (LOPRESSOR) 25 MG tablet, TAKE 1/2 TABLET BY MOUTH TWICE DAILY, Disp: 90 tablet, Rfl: 3    ondansetron (ZOFRAN-ODT) 4 MG disintegrating tablet, DISSOLVE 1 TABLET ON THE TONGUE EVERY 8 HOURS AS NEEDED FOR NAUSEA, Disp: 20 tablet, Rfl: 0    rOPINIRole (REQUIP) 0.25 MG tablet, Take 1 tablet by mouth nightly, Disp: 30 tablet, Rfl: 0    amitriptyline (ELAVIL) 25 MG tablet, TAKE 1 TABLET BY MOUTH EVERY NIGHT, Disp: 90 tablet, Rfl: 3    Ascorbic Acid (VITAMIN C PO), Take 1,000 mg by mouth, Disp: , Rfl:     lisinopril (PRINIVIL;ZESTRIL) 5 MG tablet, TAKE 1 TABLET BY MOUTH EVERY DAY, Disp: 90 tablet, Rfl: 3    omeprazole (PRILOSEC) 40 MG delayed release capsule, TAKE 1 CAPSULE BY MOUTH DAILY WITH BREAKFAST, Disp: 90 capsule, Rfl: 3    Multiple Vitamins-Minerals (PRESERVISION AREDS PO), Take by mouth, Disp: , Rfl:     aspirin 81 MG EC tablet, Take 81 mg by mouth daily, Disp: , Rfl:     Cyanocobalamin (B-12 PO), Take by mouth daily, Disp: , Rfl:     POTASSIUM PO, Take by mouth, Disp: , Rfl:     Magnesium 400 MG TABS, 100 mg , Disp: , Rfl:     Cholecalciferol (VITAMIN D PO), Take by mouth daily , Disp: , Rfl:     Past Medical History  Nicole Noel  has a past medical history of Arthritis, CAD (coronary artery disease), Degeneration of lumbosacral intervertebral disc, GERD (gastroesophageal reflux disease), Hyperlipidemia LDL goal <70, Hypertension, Hypothyroidism, Irritable bowel syndrome, Stress incontinence (female) (male), and Ulcer. Social History  Nicole Noel  reports that she has never smoked. She has never used smokeless tobacco. She reports that she does not currently use alcohol. She reports that she does not use drugs. Family History  Nicole Noel family history includes Arthritis in her mother; Coronary Art Dis in her sister; Diabetes in her brother and sister; Heart Attack in her brother; Other in her brother, father, maternal grandfather, maternal grandmother, mother, paternal grandfather, and paternal grandmother.     Past Surgical History   Past Surgical History:   Procedure Laterality Date    CARDIAC CATHETERIZATION  2008    CARPAL TUNNEL RELEASE  2008    CHOLECYSTECTOMY      COLONOSCOPY      LEXER TOE SURGERY Left     HEEL SPUR SURGERY      HYSTERECTOMY (CERVIX STATUS UNKNOWN)      age 27    KNEE SURGERY  2009    OVARY REMOVAL Bilateral     age 27    TOTAL HIP ARTHROPLASTY Right 08/2022    TOTAL THYROIDECTOMY      UPPER GASTROINTESTINAL ENDOSCOPY  2009       Review of Systems   Constitutional: Negative for chills and fever  HENT: Negative for congestion, sinus pressure, sneezing and sore throat. Eyes: Negative for pain, discharge, redness and itching. Respiratory: Negative for apnea, cough  Gastrointestinal: Negative for blood in stool, constipation, diarrhea   Endocrine: Negative for cold intolerance, heat intolerance, polydipsia. Genitourinary: Negative for dysuria, enuresis, flank pain and hematuria. Musculoskeletal: Negative for arthralgias, joint swelling and neck pain. Neurological: Negative for numbness and headaches. Psychiatric/Behavioral: Negative for agitation, confusion, decreased concentration and dysphoric mood. Objective: There were no vitals taken for this visit. Wt Readings from Last 3 Encounters:   10/19/22 273 lb 12.8 oz (124.2 kg)   09/08/22 275 lb 14.4 oz (125.1 kg)   05/05/22 270 lb 4.8 oz (122.6 kg)     BP Readings from Last 3 Encounters:   10/19/22 126/82   09/08/22 130/74   05/05/22 118/80       Nursing note and vitals reviewed. Physical Exam   Constitutional: Oriented to person, place, and time. Appears well-developed and well-nourished. ENT: Moist mucous membranes. No bleeding. Tongue is midline. Head: Normocephalic and atraumatic. Eyes: EOM are normal. Pupils are equal, round, and reactive to light. Neck: Normal range of motion. Neck supple. No JVD present. Cardiovascular: Normal rate, regular rhythm, no murmur, no rubs, and intact distal pulses. Pulmonary/Chest: Effort normal and breath sounds normal. No respiratory distress. No wheezes. No rales. Abdominal: Soft. Bowel sounds are normal. No distension. There is no tenderness. Musculoskeletal: Normal range of motion. no edema. Neurological: Alert and oriented to person, place, and time. No cranial nerve deficit. Coordination normal.   Skin: Skin is warm and dry. Psychiatric: Normal mood and affect.        No results found for: CKTOTAL, CKMB, CKMBINDEX    Lab Results   Component Value Date/Time    WBC 7.3 06/30/2021 07:31 AM    RBC 4.23 06/30/2021 07:31 AM    RBC 4.21 12/09/2020 12:15 PM    HGB 12.2 06/30/2021 07:31 AM    HCT 40.4 06/30/2021 07:31 AM    MCV 95.5 06/30/2021 07:31 AM    MCH 28.8 06/30/2021 07:31 AM    MCHC 30.2 06/30/2021 07:31 AM    RDW 13.0 12/09/2020 12:15 PM     06/30/2021 07:31 AM    MPV 9.1 06/30/2021 07:31 AM       Lab Results   Component Value Date/Time     06/06/2022 01:23 PM    K 3.8 06/06/2022 01:23 PM    K 3.8 06/30/2021 07:31 AM     06/06/2022 01:23 PM    CO2 26 06/06/2022 01:23 PM    BUN 17 06/06/2022 01:23 PM    LABALBU 4.1 06/06/2022 01:23 PM    CREATININE 0.78 06/06/2022 01:23 PM    CALCIUM 8.8 06/06/2022 01:23 PM    LABGLOM 83 06/30/2021 07:31 AM    GLUCOSE 84 06/06/2022 01:23 PM       Lab Results   Component Value Date/Time    ALKPHOS 99 06/06/2022 01:23 PM    ALKPHOS 112 08/26/2019 01:54 PM    ALT 17 06/06/2022 01:23 PM    AST 21 06/06/2022 01:23 PM    PROT 6.5 06/06/2022 01:23 PM    BILITOT 0.4 06/06/2022 01:23 PM    BILIDIR <0.2 08/15/2019 02:18 PM    LABALBU 4.1 06/06/2022 01:23 PM       Lab Results   Component Value Date/Time    MG 1.8 06/06/2022 01:23 PM       Lab Results   Component Value Date    INR 0.85 06/30/2021         Lab Results   Component Value Date/Time    LABA1C 5.1 12/09/2020 12:15 PM       Lab Results   Component Value Date/Time    TRIG 98 06/06/2022 01:23 PM    HDL 62 06/06/2022 01:23 PM    LDLCALC 130 06/06/2022 01:23 PM    LDLDIRECT 87 04/29/2016 09:37 AM    LABVLDL 20 06/06/2022 01:23 PM       Lab Results   Component Value Date/Time    TSH 3.03 12/09/2020 12:15 PM         Testing Reviewed:      I have individually reviewed the cardiac test below:    ECHO: No results found for this or any previous visit. Ekg:   EKG Interpretation:  normal EKG, normal sinus rhythm, ? Anterior infarct. OMBT:0090  SELECTIVE CORONARY ANGIOGRAM:     LEFT MAIN:  Widely patent and gives rise to LAD and left circumflex. LEFT CIRCUMFLEX:  A moderate sized vessel. It gives rise to a moderate sized  high OM that was patent. Large OM2, patent. LAD:  A large sized vessel with wraparound, gives rise to diagonals. Mid LAD  around 50 percent stenosis. It is just after the bifurcation of the second  diagonal.     FIRST DIAGONAL:  A moderate sized, patent. SECOND DIAGONAL:  Moderate sized, ostial proximal 30-40 percent stenosis. RCA:  Widely patent, dominant circulation. CONCLUSION:  Moderate nonobstructive coronary artery disease as stated above,  with mid LAD around 50 percent just after the second diagonal, and second  diagonal has ostial/proximal 30-40 percent stenosis, patent left main, patent  RCA and patent left circumflex with OM. RECOMMENDATION:  Optimal medical management and risk factor modification. Cath 6/2022  FINDINGS:  HEMODYNAMICS:  Left ventricular end-diastolic pressure was 28 mmHg. No  significant pressure gradient across the aortic valve upon catheter  pullback. LEFT VENTRICULOGRAPHY:  No regional wall motion abnormality. Ejection  fraction is preserved and estimated to be at 60%-65%. CORONARY ANGIOGRAM:  1. RIGHT CORONARY ARTERY:  Large dominant vessel with mild luminal  irregularities without significant obstructive disease. 2.  LEFT MAIN CORONARY ARTERY:  Left main coronary artery is patent  without significant stenosis. Bifurcates into LCX and LAD. 3.  LEFT CIRCUMFLEX ARTERY:  Patent with mild luminal irregularities. 4.  LEFT ANTERIOR DESCENDING ARTERY:  Proximal segment of left anterior  descending artery is patent. D1 is patent without significant stenosis.   The mid segment of left anterior descending artery has 50%-60% Cortez  type 0, 1, 1 bifurcation lesion. D2 has an ostial 30%-50% stenosis. Distal LAD is tortuous with mild luminal irregularities. MEDICATIONS:  See MAR. COMPLICATIONS:  None. ESTIMATED BLOOD LOSS:  Less than 50 mL. ACCESS:  Right radial artery access. Vasc Band was applied. Hemostasis  was achieved. IMPRESSION:  1. Nonobstructive coronary artery disease. 2.  Intermediate stenosis 50%-60% involving the mid segment of the left  anterior descending artery. Hemodynamically insignificant by FFR with  final FFR measured at 0.85.  3.  Elevated left-sided filling pressures. Left ventricular  end-diastolic pressure 28 mmHg. 4.  Congestive heart failure with preserved ejection fraction. AssessmentPlan:     Hernandez Palmer is a pleasant 75year old female patient who  has a past medical history of Arthritis, CAD (coronary artery disease), Degeneration of lumbosacral intervertebral disc, GERD (gastroesophageal reflux disease), Hyperlipidemia LDL goal <70, Hypertension, Hypothyroidism, Irritable bowel syndrome, Stress incontinence (female) (male), and Ulcer. Her FH is significant for CAD, CHF, patient's brother  from [place]. The patient has h/o mild cardiomyopathy. Echocardiogram in  revealed an EF of 45-50%. [address] [place] [address] 2021 revealed a 50-60% mLAD stenosis, FFR 0.85. LVEDP was 28 mmHg, lasix was added. Echo [id number] revealed an EF of 60%. Patient denies chest pain, shortness of breath, dyspnea on exertion, orthopnea, paroxysmal nocturnal dyspnea, palpitations, dizziness, syncope, weight gain or leg swelling. The patient underwent right hip surgery, no cardiac complications. Feels better, now more active. She states that left knee surgery is being considered.    Recent COVID   CAD (nonobstructive, 50% mid LAD stenosis, Cath )  Mild cardiomyopathy (EF 45-50% on Echo )   Dyslipidemia   Hypertension  FH of CAD, CHF  Obesity     Recent COVID infection, mild  Symptoms resolved   No fever  On lasix   No signs of volume overload   Limit Na intake  Daily weight   The patient was instructed to check the blood pressure at home, and record the readings. Patient will call office with blood pressure readings, will adjust patient's antihypertensive medications as needed accordingly    Known CAD  FFR LAD 0.85  Continue risk factor modification and medical management  The patient is advised to begin progressive daily aerobic exercise program and attempt to lose weight  Lipitor     Above findings and plan of care were discussed with patient in details, patient's questions were answered.      Disposition:  RTC in 12 months             Electronically signed by Jose Rafael Kc MD, [facility] [place]    10/27/2022 at 1:11 PM EDT

## 2022-10-27 NOTE — PROGRESS NOTES
Pt here for 6 mo office visit    EKG done today     Pt continues with sob     Pt asking for zofran refilll pcp out of town,

## 2022-10-31 DIAGNOSIS — R11.0 NAUSEA: ICD-10-CM

## 2022-10-31 RX ORDER — ONDANSETRON 4 MG/1
TABLET, ORALLY DISINTEGRATING ORAL
Qty: 20 TABLET | Refills: 0 | Status: SHIPPED | OUTPATIENT
Start: 2022-10-31

## 2022-10-31 NOTE — TELEPHONE ENCOUNTER
----- Message from Ernesto Solis sent at 10/31/2022  9:49 AM EDT -----  Subject: Refill Request    QUESTIONS  Name of Medication? ondansetron (ZOFRAN-ODT) 4 MG disintegrating tablet  Patient-reported dosage and instructions? once a day   How many days do you have left? 0  Preferred Pharmacy? [facility] #[id number]  Pharmacy phone number (if available)? [phone]  ---------------------------------------------------------------------------  --------------  CALL BACK INFO  What is the best way for the office to contact you? OK to leave message on   voicemail  Preferred Call Back Phone Number? [phone]  ---------------------------------------------------------------------------  --------------  SCRIPT ANSWERS  Relationship to Patient?  Self

## 2022-12-05 RX ORDER — FLUCONAZOLE 150 MG/1
TABLET ORAL
Qty: 2 TABLET | Refills: 0 | Status: SHIPPED | OUTPATIENT
Start: 2022-12-05

## 2022-12-06 ENCOUNTER — OFFICE VISIT (OUTPATIENT)
Dept: FAMILY MEDICINE CLINIC | Age: 67
End: 2022-12-06
Payer: MEDICARE

## 2022-12-06 VITALS
DIASTOLIC BLOOD PRESSURE: 74 MMHG | RESPIRATION RATE: 16 BRPM | BODY MASS INDEX: 48.87 KG/M2 | HEART RATE: 80 BPM | SYSTOLIC BLOOD PRESSURE: 126 MMHG | WEIGHT: 275.9 LBS

## 2022-12-06 DIAGNOSIS — R73.01 IFG (IMPAIRED FASTING GLUCOSE): ICD-10-CM

## 2022-12-06 DIAGNOSIS — K21.9 GASTROESOPHAGEAL REFLUX DISEASE, UNSPECIFIED WHETHER ESOPHAGITIS PRESENT: ICD-10-CM

## 2022-12-06 DIAGNOSIS — E66.01 MORBID OBESITY (HCC): Primary | ICD-10-CM

## 2022-12-06 DIAGNOSIS — E03.9 ACQUIRED HYPOTHYROIDISM: ICD-10-CM

## 2022-12-06 DIAGNOSIS — I25.10 CORONARY ARTERY DISEASE INVOLVING NATIVE CORONARY ARTERY OF NATIVE HEART WITHOUT ANGINA PECTORIS: ICD-10-CM

## 2022-12-06 DIAGNOSIS — B00.1 COLD SORE: ICD-10-CM

## 2022-12-06 PROCEDURE — 1090F PRES/ABSN URINE INCON ASSESS: CPT | Performed by: FAMILY MEDICINE

## 2022-12-06 PROCEDURE — 1036F TOBACCO NON-USER: CPT | Performed by: FAMILY MEDICINE

## 2022-12-06 PROCEDURE — 1123F ACP DISCUSS/DSCN MKR DOCD: CPT | Performed by: FAMILY MEDICINE

## 2022-12-06 PROCEDURE — 99214 OFFICE O/P EST MOD 30 MIN: CPT | Performed by: FAMILY MEDICINE

## 2022-12-06 PROCEDURE — G8417 CALC BMI ABV UP PARAM F/U: HCPCS | Performed by: FAMILY MEDICINE

## 2022-12-06 PROCEDURE — G8484 FLU IMMUNIZE NO ADMIN: HCPCS | Performed by: FAMILY MEDICINE

## 2022-12-06 PROCEDURE — G8427 DOCREV CUR MEDS BY ELIG CLIN: HCPCS | Performed by: FAMILY MEDICINE

## 2022-12-06 PROCEDURE — G8399 PT W/DXA RESULTS DOCUMENT: HCPCS | Performed by: FAMILY MEDICINE

## 2022-12-06 PROCEDURE — 3017F COLORECTAL CA SCREEN DOC REV: CPT | Performed by: FAMILY MEDICINE

## 2022-12-06 RX ORDER — LIRAGLUTIDE 6 MG/ML
INJECTION, SOLUTION SUBCUTANEOUS
Qty: 3 ML | Refills: 0 | Status: SHIPPED | OUTPATIENT
Start: 2022-12-06

## 2022-12-06 RX ORDER — VALACYCLOVIR HYDROCHLORIDE 1 G/1
TABLET, FILM COATED ORAL
Qty: 20 TABLET | Refills: 2 | Status: SHIPPED | OUTPATIENT
Start: 2022-12-06

## 2022-12-06 RX ORDER — ROPINIROLE 0.25 MG/1
0.25 TABLET, FILM COATED ORAL NIGHTLY
Qty: 90 TABLET | Refills: 3 | Status: SHIPPED | OUTPATIENT
Start: 2022-12-06

## 2022-12-07 ASSESSMENT — ENCOUNTER SYMPTOMS
GASTROINTESTINAL NEGATIVE: 1
RESPIRATORY NEGATIVE: 1

## 2022-12-07 NOTE — PROGRESS NOTES
REMOVAL Bilateral     age 27    TOTAL HIP ARTHROPLASTY Right 08/2022    TOTAL THYROIDECTOMY      UPPER GASTROINTESTINAL ENDOSCOPY  2009     Social History     Tobacco Use    Smoking status: Never    Smokeless tobacco: Never   Vaping Use    Vaping Use: Never used   Substance Use Topics    Alcohol use: Not Currently    Drug use: No         Review of Systems   Constitutional: Negative. HENT: Negative. Respiratory: Negative. Cardiovascular: Negative. Gastrointestinal: Negative. Musculoskeletal: Negative. All other systems reviewed and are negative. Objective   Physical Exam  Vitals and nursing note reviewed. Constitutional:       General: She is not in acute distress. Appearance: Normal appearance. She is well-developed. HENT:      Head: Normocephalic and atraumatic. Right Ear: Tympanic membrane normal.      Left Ear: Tympanic membrane normal.   Eyes:      Conjunctiva/sclera: Conjunctivae normal.   Cardiovascular:      Rate and Rhythm: Normal rate and regular rhythm. Heart sounds: Normal heart sounds. No murmur heard. Pulmonary:      Effort: Pulmonary effort is normal.      Breath sounds: Normal breath sounds. No wheezing, rhonchi or rales. Abdominal:      General: There is no distension. Musculoskeletal:      Cervical back: Neck supple. Skin:     General: Skin is warm and dry. Findings: No rash (on exposed surfaces). Neurological:      General: No focal deficit present. Mental Status: She is alert. Psychiatric:         Attention and Perception: Attention normal.         Mood and Affect: Mood normal.         Speech: Speech normal.         Behavior: Behavior normal. Behavior is cooperative. Thought Content: Thought content normal.         Judgment: Judgment normal.             ASSESSMENT/PLAN:  1.  Morbid obesity ([facility] [address]  -     liraglutide-weight management (SAXENDA) 18 MG/3ML SOPN; Inject 0.6 mg SC daily x 1 week, then increase dose 0.6 mg/day every week to target 3 mg SC daily. , Disp-3 mL, R-0Normal  -     Insulin Pen Needle 31G X 5 MM MISC; Disp-50 each, R-0, NormalUse daily with Saxenda  2. Cold sore  -     valACYclovir (VALTREX) 1 g tablet; Take 2 tabs BID for 1 day at onset of cold sore, Disp-20 tablet, R-2Normal  3. Coronary artery disease involving native coronary artery of native heart without angina pectoris  4. Gastroesophageal reflux disease, unspecified whether esophagitis present  5. Acquired hypothyroidism  6. IFG (impaired fasting glucose)    -  Chronic medical problems stable  -  Continue current medications  -  Follow up with specialists as scheduled  -  Start Saxenda    Return in about 4 weeks (around 1/3/2023) for [address] [place] [address] follow up. Plan to transition to [facility] at that time if tolerating. An electronic signature was used to authenticate this note.     --Yoel Brooks, DO

## 2023-01-05 DIAGNOSIS — R11.0 NAUSEA: ICD-10-CM

## 2023-01-05 RX ORDER — ONDANSETRON 4 MG/1
TABLET, ORALLY DISINTEGRATING ORAL
Qty: 20 TABLET | Refills: 0 | Status: SHIPPED | OUTPATIENT
Start: 2023-01-05

## 2023-01-05 NOTE — TELEPHONE ENCOUNTER
Patient requesting refill of Zofran ODT to [facility]. Please refill if appropriate.   Will check with pharmacy after 2pm.

## 2023-01-26 ENCOUNTER — HOSPITAL ENCOUNTER (EMERGENCY)
Age: 68
Discharge: HOME | End: 2023-01-26
Attending: EMERGENCY MEDICINE
Payer: MEDICARE

## 2023-01-26 ENCOUNTER — APPOINTMENT (OUTPATIENT)
Dept: GENERAL RADIOLOGY | Age: 68
End: 2023-01-26
Payer: MEDICARE

## 2023-01-26 VITALS
TEMPERATURE: 97.9 F | RESPIRATION RATE: 18 BRPM | SYSTOLIC BLOOD PRESSURE: 146 MMHG | DIASTOLIC BLOOD PRESSURE: 90 MMHG | BODY MASS INDEX: 47.47 KG/M2 | HEART RATE: 62 BPM | OXYGEN SATURATION: 97 % | WEIGHT: 268 LBS

## 2023-01-26 DIAGNOSIS — R07.89 CHEST PAIN, ATYPICAL: Primary | ICD-10-CM

## 2023-01-26 DIAGNOSIS — R10.13 DYSPEPSIA: ICD-10-CM

## 2023-01-26 LAB
ANION GAP SERPL CALC-SCNC: 8 MEQ/L (ref 8–16)
BASOPHILS ABSOLUTE: 0 THOU/MM3 (ref 0–0.1)
BASOPHILS NFR BLD AUTO: 0.7 %
BUN SERPL-MCNC: 20 MG/DL (ref 7–22)
CALCIUM SERPL-MCNC: 8.6 MG/DL (ref 8.5–10.5)
CHLORIDE SERPL-SCNC: 106 MEQ/L (ref 98–111)
CO2 SERPL-SCNC: 27 MEQ/L (ref 23–33)
CREAT SERPL-MCNC: 0.7 MG/DL (ref 0.4–1.2)
DEPRECATED RDW RBC AUTO: 47.8 FL (ref 35–45)
EOSINOPHIL NFR BLD AUTO: 3.7 %
EOSINOPHILS ABSOLUTE: 0.2 THOU/MM3 (ref 0–0.4)
ERYTHROCYTE [DISTWIDTH] IN BLOOD BY AUTOMATED COUNT: 14.7 % (ref 11.5–14.5)
GFR SERPL CREATININE-BSD FRML MDRD: > 60 ML/MIN/1.73M2
GLUCOSE SERPL-MCNC: 92 MG/DL (ref 70–108)
HCT VFR BLD AUTO: 39.9 % (ref 37–47)
HGB BLD-MCNC: 12.8 GM/DL (ref 12–16)
IMM GRANULOCYTES # BLD AUTO: 0 THOU/MM3 (ref 0–0.07)
IMM GRANULOCYTES NFR BLD AUTO: 0 %
LYMPHOCYTES ABSOLUTE: 1.8 THOU/MM3 (ref 1–4.8)
LYMPHOCYTES NFR BLD AUTO: 32.1 %
MCH RBC QN AUTO: 28.4 PG (ref 26–33)
MCHC RBC AUTO-ENTMCNC: 32.1 GM/DL (ref 32.2–35.5)
MCV RBC AUTO: 88.5 FL (ref 81–99)
MONOCYTES ABSOLUTE: 0.5 THOU/MM3 (ref 0.4–1.3)
MONOCYTES NFR BLD AUTO: 8.3 %
NEUTROPHILS NFR BLD AUTO: 55.2 %
NRBC BLD AUTO-RTO: 0 /100 WBC
PLATELET # BLD AUTO: 296 THOU/MM3 (ref 130–400)
PMV BLD AUTO: 9.2 FL (ref 9.4–12.4)
POTASSIUM SERPL-SCNC: 4 MEQ/L (ref 3.5–5.2)
RBC # BLD AUTO: 4.51 MILL/MM3 (ref 4.2–5.4)
SEGMENTED NEUTROPHILS ABSOLUTE COUNT: 3.1 THOU/MM3 (ref 1.8–7.7)
SODIUM SERPL-SCNC: 141 MEQ/L (ref 135–145)
TROPONIN T: < 0.01 NG/ML
WBC # BLD AUTO: 5.6 THOU/MM3 (ref 4.8–10.8)

## 2023-01-26 PROCEDURE — 99203 OFFICE O/P NEW LOW 30 MIN: CPT | Performed by: NURSE PRACTITIONER

## 2023-01-26 PROCEDURE — 93005 ELECTROCARDIOGRAM TRACING: CPT | Performed by: NURSE PRACTITIONER

## 2023-01-26 PROCEDURE — 36415 COLL VENOUS BLD VENIPUNCTURE: CPT

## 2023-01-26 PROCEDURE — 71046 X-RAY EXAM CHEST 2 VIEWS: CPT

## 2023-01-26 PROCEDURE — 99284 EMERGENCY DEPT VISIT MOD MDM: CPT

## 2023-01-26 PROCEDURE — 85025 COMPLETE CBC W/AUTO DIFF WBC: CPT

## 2023-01-26 PROCEDURE — 99215 OFFICE O/P EST HI 40 MIN: CPT

## 2023-01-26 PROCEDURE — 93010 ELECTROCARDIOGRAM REPORT: CPT | Performed by: INTERNAL MEDICINE

## 2023-01-26 PROCEDURE — 80048 BASIC METABOLIC PNL TOTAL CA: CPT

## 2023-01-26 PROCEDURE — 84484 ASSAY OF TROPONIN QUANT: CPT

## 2023-01-26 RX ORDER — OMEPRAZOLE 40 MG/1
CAPSULE, DELAYED RELEASE ORAL
Qty: 90 CAPSULE | Refills: 3 | Status: SHIPPED | OUTPATIENT
Start: 2023-01-26

## 2023-01-26 ASSESSMENT — ENCOUNTER SYMPTOMS
COUGH: 0
SHORTNESS OF BREATH: 1
NAUSEA: 1
VOMITING: 0
ABDOMINAL PAIN: 0
DIARRHEA: 0

## 2023-01-26 ASSESSMENT — PAIN - FUNCTIONAL ASSESSMENT: PAIN_FUNCTIONAL_ASSESSMENT: NONE - DENIES PAIN

## 2023-01-26 NOTE — ED NOTES
Pt to the ED via self from urgent care. Patient presents with complaints of \"the burps\" for the several weeks. Patient states that she is concerned due to her family history of heart attacks. EKG done at urgent care. Patient is alert and oriented x 4. Respirations are regular and unlabored. Patient provided blanket. Call light within reach.      Mortimer Rummer, RN  01/26/23 0084

## 2023-01-26 NOTE — ED NOTES
Pt up in bed with blankets over bottom half. Patient awaiting evaluation after blood drawn. Patient VSS. Respirations are regular and unlabored, patient is alert and oriented x4. Patient bed rails up x2 and call light within reach. Will continue to monitor.        Adrianna Washington RN  01/26/23 0834

## 2023-01-26 NOTE — ED PROVIDER NOTES
[address] [facility] [address] [address] EMERGENCY DEPT    EMERGENCY MEDICINE      Pt Name: Arnold Sosa  MRN: [id number]  [place] 1955  Date of evaluation: 1/26/2023  Treating Resident Physician: Juan Adan MD  Supervising Physician: Nima Dela Cruz, [facility]       Chief Complaint   Patient presents with    Other     Extra burping with some tightness up the middle of my chest with the burping     History obtained from chart review and the patient. HISTORY OF PRESENT ILLNESS    HPI    Arnold Sosa is a 70 y.o. female with PMH of CAD, hypothyroidism, IBS, chest pain, abdominal pain, GERD presents to the emergency department for evaluation of intermittent chest pain. Patient stated that a week and a half ago she went to the gym and worked out on the bicycle. Since then she has been burping more often and when she burps she feels a tightness in her chest.  She stated that she only feels this pain when she burps and when she is not burping there is no pain. She denies any nausea, vomiting, shortness of breath, cough, fever at any time. The patient has no other acute complaints at this time.     PAST MEDICAL AND SURGICAL HISTORY     Past Medical History:   Diagnosis Date    Arthritis     CAD (coronary artery disease) 3/13/2014    Degeneration of lumbosacral intervertebral disc 10/19/2022    GERD (gastroesophageal reflux disease) 3/13/2014    Hyperlipidemia LDL goal <70 3/13/2014    Hypertension     Hypothyroidism     Irritable bowel syndrome     Stress incontinence (female) (male) 8/29/2018    Ulcer        Past Surgical History:   Procedure Laterality Date    CARDIAC CATHETERIZATION  2008    CARPAL TUNNEL RELEASE  2008    CHOLECYSTECTOMY      COLONOSCOPY      HAMMER TOE SURGERY Left     HEEL SPUR SURGERY      HIP SURGERY  08/2022    HYSTERECTOMY (CERVIX STATUS UNKNOWN)      age 27    KNEE SURGERY  2009    OVARY REMOVAL Bilateral     age 27    TOTAL HIP ARTHROPLASTY Right 08/2022    TOTAL THYROIDECTOMY      UPPER GASTROINTESTINAL ENDOSCOPY  2009       CURRENT MEDICATIONS     Discharge Medication List as of 1/26/2023  3:44 PM        CONTINUE these medications which have NOT CHANGED    Details   omeprazole (PRILOSEC) 40 MG delayed release capsule TAKE 1 CAPSULE BY MOUTH DAILY WITH BREAKFAST, Disp-90 capsule, R-3Normal      ondansetron (ZOFRAN-ODT) 4 MG disintegrating tablet DISSOLVE 1 TABLET ON THE TONGUE EVERY 8 HOURS AS NEEDED FOR NAUSEA, Disp-20 tablet, R-0Normal      rOPINIRole (REQUIP) 0.25 MG tablet Take 1 tablet by mouth nightly, Disp-90 tablet, R-3Normal      valACYclovir (VALTREX) 1 g tablet Take 2 tabs BID for 1 day at onset of cold sore, Disp-20 tablet, R-2Normal      liraglutide-weight management (SAXENDA) 18 MG/3ML SOPN Inject 0.6 mg SC daily x 1 week, then increase dose 0.6 mg/day every week to target 3 mg SC daily. , Disp-3 mL, R-0Normal      Insulin Pen Needle 31G X 5 MM MISC Disp-50 each, R-0, NormalUse daily with Saxenda      fluconazole (DIFLUCAN) 150 MG tablet TAKE 1 TABLET BY MOUTH 1 TIME FOR 1 DOSE. MAY REPEAT IN 3-5 DAYS, IF SYMPTOMS PERSIST OR RECUR, Disp-2 tablet, R-0Normal       MG tablet TAKE 1 TABLET EVERY 6 HOURS AS NEEDED FOR PAIN, Disp-360 tablet, R-0Normal      rivaroxaban (XARELTO) 10 MG TABS tablet Take 10 mg by mouth dailyHistorical Med      traMADol (ULTRAM) 50 MG tablet Take 1 tablet by mouth every 6 hours as needed. Historical Med      atorvastatin (LIPITOR) 80 MG tablet TAKE 1 TABLET BY MOUTH DAILY, Disp-90 tablet, R-1Normal      furosemide (LASIX) 20 MG tablet Take 1 tablet by mouth daily, Disp-90 tablet, R-3Normal      metoprolol tartrate (LOPRESSOR) 25 MG tablet TAKE 1/2 TABLET BY MOUTH TWICE DAILY, Disp-90 tablet, R-3Normal      amitriptyline (ELAVIL) 25 MG tablet TAKE 1 TABLET BY MOUTH EVERY NIGHT, Disp-90 tablet, R-3Normal      Ascorbic Acid (VITAMIN C PO) Take 1,000 mg by mouthHistorical Med      lisinopril (PRINIVIL;ZESTRIL) 5 MG tablet TAKE 1 TABLET BY MOUTH EVERY DAY, Disp-90 tablet, R-3Normal      Multiple Vitamins-Minerals (PRESERVISION AREDS PO) Take by mouthHistorical Med      aspirin 81 MG EC tablet Take 81 mg by mouth dailyHistorical Med      Cyanocobalamin (B-12 PO) Take by mouth dailyHistorical Med      POTASSIUM PO Take by mouthHistorical Med      Magnesium 400 MG TABS 100 mg Historical Med      Cholecalciferol (VITAMIN D PO) Take by mouth daily Historical Med             ALLERGIES     Allergies   Allergen Reactions    Meloxicam Nausea And Vomiting       FAMILY HISTORY     Family History   Problem Relation Age of Onset    Arthritis Mother     Other Mother         over weight    Other Father         over weight    Diabetes Brother     Other Brother         over weight    Heart Attack Brother     Other Maternal Grandmother         over weight    Other Maternal Grandfather         over weight    Other Paternal Grandmother         over weight    Other Paternal Grandfather         over weight    Diabetes Sister     Coronary Art Dis Sister        SOCIAL HISTORY     Social History     Tobacco Use    Smoking status: Never    Smokeless tobacco: Never   Vaping Use    Vaping Use: Never used   Substance Use Topics    Alcohol use: Not Currently    Drug use: No       PHYSICAL EXAM     ED Triage Vitals [01/26/23 1304]   BP Temp Temp Source Heart Rate Resp SpO2 Height Weight   (!) 152/72 97.9 °F (36.6 °C) Temporal 60 16 97 % -- 268 lb (121.6 kg)     Body mass index is 47.47 kg/m². Initial vital signs and nursing assessment reviewed and abnormal from hypertension . Physical Exam  Vitals and nursing note reviewed. Constitutional:       General: She is not in acute distress. Appearance: Normal appearance. She is not ill-appearing, toxic-appearing or diaphoretic. HENT:      Head: Normocephalic and atraumatic. Right Ear: External ear normal.      Left Ear: External ear normal.      Nose: Nose normal. No congestion or rhinorrhea.       Mouth/Throat:      Mouth: Mucous membranes are moist.      Pharynx: Oropharynx is clear. No oropharyngeal exudate or posterior oropharyngeal erythema. Eyes:      General: No scleral icterus. Right eye: No discharge. Left eye: No discharge. Extraocular Movements: Extraocular movements intact. Conjunctiva/sclera: Conjunctivae normal.      Pupils: Pupils are equal, round, and reactive to light. Cardiovascular:      Rate and Rhythm: Normal rate and regular rhythm. Pulses: Normal pulses. Heart sounds: Normal heart sounds. No murmur heard. No gallop. Pulmonary:      Effort: Pulmonary effort is normal. No respiratory distress. Breath sounds: Normal breath sounds. No stridor. No wheezing, rhonchi or rales. Abdominal:      General: Bowel sounds are normal. There is no distension. Palpations: Abdomen is soft. Tenderness: There is no abdominal tenderness. There is no right CVA tenderness, left CVA tenderness, guarding or rebound. Musculoskeletal:         General: No swelling, tenderness, deformity or signs of injury. Cervical back: Neck supple. No rigidity or tenderness. Right lower leg: No edema. Left lower leg: No edema. Lymphadenopathy:      Cervical: No cervical adenopathy. Skin:     General: Skin is warm and dry. Capillary Refill: Capillary refill takes less than 2 seconds. Coloration: Skin is not jaundiced or pale. Findings: No bruising, erythema, lesion or rash. Neurological:      General: No focal deficit present. Mental Status: She is alert and oriented to person, place, and time. Mental status is at baseline. Psychiatric:         Mood and Affect: Mood normal.         Behavior: Behavior normal.         Thought Content:  Thought content normal.         Judgment: Judgment normal.        FORMAL DIAGNOSTIC RESULTS     RADIOLOGY: Interpretation per the Radiologist below, if available at the time of this note (none if blank):    XR CHEST (2 VW)   Final Result   Stable radiographic appearance of the chest. No evidence of an acute process. **This report has been created using voice recognition software. It may contain minor errors which are inherent in voice recognition technology. **      Final report electronically signed by Dr. Ayaan Ochoa on 1/26/2023 2:36 PM          LABS: (none if blank)  Labs Reviewed   CBC WITH AUTO DIFFERENTIAL - Abnormal; Notable for the following components:       Result Value    MCHC 32.1 (*)     RDW-CV 14.7 (*)     RDW-SD 47.8 (*)     MPV 9.2 (*)     All other components within normal limits   BASIC METABOLIC PANEL   TROPONIN   ANION GAP   GLOMERULAR FILTRATION RATE, ESTIMATED       (Any cultures that may have been sent were not resulted at the time of this patient visit)    MEDICAL DECISION MAKING     1) Number and Complexity of Problems            Problem List This Visit:         Chief Complaint   Patient presents with    Other     Extra burping with some tightness up the middle of my chest with the burping         Differential Diagnosis includes (but not limited to):  GERD, hiatal hernia, peptic ulcer, MI        Diagnoses Considered with low index of suspicion:   PE             Pertinent Comorbid Conditions:    GERD, CAD, obesity    2)  Data Reviewed (none if blank or additional interpretation can be found in ED Course)          My Independent interpretations:     EKG:      ECG showing sinus bradycardia without ST elevation, with normal intervals, and no ectopy. Imaging: Normal chest x-ray    Labs:      Troponin negative electrolytes normal and normal cell counts present on labs                 Decision Rules/Clinical Scores utilized:              External Documentation Reviewed:   Previous patient encounter documents & history available on EMR was reviewed as available. Patient transferred from urgent care for concern of cardiac chest pain .       3)  Treatment and Disposition         ED Reassessment: Patient had no change during time in ED. Case discussed with consulting clinician:           Shared Decision-Making was performed and disposition discussed with the        patient/caregiver at bedside with all questions answered. Social determinants of health impacting treatment or disposition:           Code Status: Full code      Summary of Patient Presentation:      MDM  Number of Diagnoses or Management Options  Chest pain, atypical: new, needed workup  Dyspepsia: new, needed workup  Diagnosis management comments: 69-year-old female coming to the ED for evaluation of chest pain. Patient describes the pain as tightness in the center of her chest which only feels when she burps. Due to the intermittent nature and presents only with burping likelihood of cardiac is low. To confirm this ECG and troponin were ordered both showing negative for MI. Chest x-ray was negative for pneumonia. Patient's blood count and electrolytes were normal.  Discussed findings with patient and shared decision making was made and patient was discharged with strict return precautions and recommended outpatient follow-up with gastroenterology.        Amount and/or Complexity of Data Reviewed  Clinical lab tests: reviewed and ordered  Tests in the radiology section of CPT®: ordered and reviewed  Tests in the medicine section of CPT®: ordered and reviewed  Review and summarize past medical records: yes  Independent visualization of images, tracings, or specimens: yes    Risk of Complications, Morbidity, and/or Mortality  Presenting problems: moderate  Diagnostic procedures: moderate  Management options: moderate    Patient Progress  Patient progress: stable  /  ED Course as of 01/26/23 1553   Thu Jan 26, 2023   1327 EKG 12 Lead  Reviewed, SB  [HA]      ED Course User Index  [address] [facility]     Vitals Reviewed:    Vitals:    01/26/23 1304 01/26/23 1405 01/26/23 1503   BP: (!) 152/72 (!) 143/74 (!) 146/90   Pulse: 60 68 62 Resp: 16 18 18   Temp: 97.9 °F (36.6 °C)     TempSrc: Temporal     SpO2: 97% 98% 97%   Weight: 268 lb (121.6 kg)         The patient was seen and examined. Appropriate diagnostic testing was performed and results reviewed with the patient and/or caregivers. The results of pertinent diagnostic studies and exam findings were discussed. The patients provisional diagnosis and plan of care were discussed with the patient and present caregivers who expressed understanding. Any medications were reviewed and indications and risks of medications were discussed. Strict verbal and written return precautions, instructions and appropriate follow-up were provided to the patient. ED Medications administered this visit:  (None if blank)  Medications - No data to display    PROCEDURES: (None if blank)  Procedures:     CRITICAL CARE: (None if blank)    DISCHARGE PRESCRIPTIONS: (None if blank)  Discharge Medication List as of 1/26/2023  3:44 PM            FINAL IMPRESSION     Final diagnoses:   Dyspepsia   Chest pain, atypical     1. Chest pain, atypical    2. Dyspepsia        DISPOSITION / PLAN   DISPOSITION Decision To Discharge 01/26/2023 03:41:19 PM      OUTPATIENT FOLLOW UP THE PATIENT:  [facility] EMERGENCY DEPT  [address] [address]  [phone]  Go to   If symptoms worsen    Donald Brown, DO  [address] [place] [address] [place]  [facility].[place] [address] [facility]    Schedule an appointment as soon as possible for a visit   As needed for follow-up    [address]  [phone]  Schedule an appointment as soon as possible for a visit   For follow-up        This transcription was electronically signed. Parts of this transcriptions may have been dictated by use of voice recognition software and electronically transcribed, and parts may have been transcribed with the assistance of an ED scribe. The transcription may contain errors not detected in proofreading.  Please refer to my supervising physician's documentation if my documentation differs.     Electronically Signed: Val Rene MD, 01/26/23, 3:53 PM        Val Rene MD  Resident  01/26/23 5973

## 2023-01-26 NOTE — ED NOTES
Patient in bed and talking with Dr. Percy Price at the bedside. Patient provided education regarding current situation with pt plan of care. Patient expresses need for a large bp cuff at this time. Patient VSS and patient does not appear to be in any current distress at this time. Will continue to monitor. Call light within reach.        All Palma RN  01/26/23 1066

## 2023-01-26 NOTE — DISCHARGE INSTRUCTIONS
If you develop change or concerning your chest pain, develop shortness of breath, nausea or vomiting, sweatiness with this pain please return to the ER for evaluation. Otherwise recommend following up with gastroenterology for assessment and treatment as the cause of your symptoms are likely GI. Also follow-up with your PCP as needed to avoid delays in care.

## 2023-01-26 NOTE — ED TRIAGE NOTES
Pt to room 2 , then transferred to room 5 with c/o burping and chest tightness \"With the burping\" x 1 week that started after she was exercising at a gym on a bike or treadmill. She states she has been burping ever since. She also c/o left arm and shoulder pain at times but reports that she has recently injured her srt shoulder and it only hurts with certain movements. EKG complete and results given to provider.

## 2023-01-26 NOTE — ED PROVIDER NOTES
[address]       Chief Complaint   Patient presents with    Other     Extra burping with some tightness up the middle of my chest with the burping       Nurses Notes reviewed and I agree except as noted in the HPI. HISTORY OF PRESENT ILLNESS   Davie Maldonado is a 70 y.o. female who presents to the urgent care for evaluation. She complains of burping, more then normal, and tightness in her chest, which radiates into left arm at times. Started 1 week ago after working out at [employer]. She is concerned it may be her heart and not her GERD, for which she takes omeprazole daily for, last dose this morning. She has noticed shortness of breath and increased chest pain with activity. Has nausea. Felt her heart was racing the other night. Extensive medical history as below including CAD, HTN, GERD, peptic ulcer disease. The patient/patient representative has no other acute complaints at this time. REVIEW OF SYSTEMS     Review of Systems   Constitutional:  Positive for fatigue (at times). Negative for chills and fever. HENT:  Negative for ear pain. Respiratory:  Positive for shortness of breath (at times). Negative for cough. Cardiovascular:  Positive for chest pain (tightness chest of chest). Gastrointestinal:  Positive for nausea. Negative for abdominal pain, diarrhea and vomiting. GERD   Skin:  Negative for rash. Allergic/Immunologic: Negative for environmental allergies and food allergies. Neurological:  Negative for headaches.      PAST MEDICAL HISTORY         Diagnosis Date    Arthritis     CAD (coronary artery disease) 3/13/2014    Degeneration of lumbosacral intervertebral disc 10/19/2022    GERD (gastroesophageal reflux disease) 3/13/2014    Hyperlipidemia LDL goal <70 3/13/2014    Hypertension     Hypothyroidism     Irritable bowel syndrome     Stress incontinence (female) (male) 8/29/2018    Ulcer        SURGICAL HISTORY     Patient  has a past surgical history that includes Total Thyroidectomy; Heel spur surgery; Colonoscopy; Cholecystectomy; Cardiac catheterization (2008); knee surgery (2009); Carpal tunnel release (2008); Upper gastrointestinal endoscopy (2009); Hammer toe surgery (Left); Hysterectomy; Ovary removal (Bilateral); Total hip arthroplasty (Right, 08/2022); and hip surgery (08/2022). CURRENT MEDICATIONS       Previous Medications    AMITRIPTYLINE (ELAVIL) 25 MG TABLET    TAKE 1 TABLET BY MOUTH EVERY NIGHT    ASCORBIC ACID (VITAMIN C PO)    Take 1,000 mg by mouth    ASPIRIN 81 MG EC TABLET    Take 81 mg by mouth daily    ATORVASTATIN (LIPITOR) 80 MG TABLET    TAKE 1 TABLET BY MOUTH DAILY    CHOLECALCIFEROL (VITAMIN D PO)    Take by mouth daily     CYANOCOBALAMIN (B-12 PO)    Take by mouth daily    FLUCONAZOLE (DIFLUCAN) 150 MG TABLET    TAKE 1 TABLET BY MOUTH 1 TIME FOR 1 DOSE. MAY REPEAT IN 3-5 DAYS, IF SYMPTOMS PERSIST OR RECUR    FUROSEMIDE (LASIX) 20 MG TABLET    Take 1 tablet by mouth daily     MG TABLET    TAKE 1 TABLET EVERY 6 HOURS AS NEEDED FOR PAIN    INSULIN PEN NEEDLE 31G X 5 MM MISC    Use daily with Saxenda    LIRAGLUTIDE-WEIGHT MANAGEMENT (SAXENDA) 18 MG/3ML SOPN    Inject 0.6 mg SC daily x 1 week, then increase dose 0.6 mg/day every week to target 3 mg SC daily.     LISINOPRIL (PRINIVIL;ZESTRIL) 5 MG TABLET    TAKE 1 TABLET BY MOUTH EVERY DAY    MAGNESIUM 400 MG TABS    100 mg     METOPROLOL TARTRATE (LOPRESSOR) 25 MG TABLET    TAKE 1/2 TABLET BY MOUTH TWICE DAILY    MULTIPLE VITAMINS-MINERALS (PRESERVISION AREDS PO)    Take by mouth    OMEPRAZOLE (PRILOSEC) 40 MG DELAYED RELEASE CAPSULE    TAKE 1 CAPSULE BY MOUTH DAILY WITH BREAKFAST    ONDANSETRON (ZOFRAN-ODT) 4 MG DISINTEGRATING TABLET    DISSOLVE 1 TABLET ON THE TONGUE EVERY 8 HOURS AS NEEDED FOR NAUSEA    POTASSIUM PO    Take by mouth    RIVAROXABAN (XARELTO) 10 MG TABS TABLET    Take 10 mg by mouth daily    ROPINIROLE (REQUIP) 0.25 MG TABLET    Take 1 tablet by mouth nightly    TRAMADOL (ULTRAM) 50 MG TABLET    Take 1 tablet by mouth every 6 hours as needed. VALACYCLOVIR (VALTREX) 1 G TABLET    Take 2 tabs BID for 1 day at onset of cold sore       ALLERGIES     Patient is is allergic to meloxicam.    FAMILY HISTORY     Patient'sfamily history includes Arthritis in her mother; Coronary Art Dis in her sister; Diabetes in her brother and sister; Heart Attack in her brother; Other in her brother, father, maternal grandfather, maternal grandmother, mother, paternal grandfather, and paternal grandmother. SOCIAL HISTORY     Patient  reports that she has never smoked. She has never used smokeless tobacco. She reports that she does not currently use alcohol. She reports that she does not use drugs. PHYSICAL EXAM     ED TRIAGE VITALS  BP: (!) 152/72, Temp: 97.9 °F (36.6 °C), Heart Rate: 60, Resp: 16, SpO2: 97 %  Physical Exam  Vitals and nursing note reviewed. Constitutional:       General: She is not in acute distress. Appearance: Normal appearance. She is well-developed and well-groomed. She is obese. HENT:      Head: Normocephalic and atraumatic. Right Ear: External ear normal.      Left Ear: External ear normal.      Mouth/Throat:      Lips: Pink. Mouth: Mucous membranes are moist.   Eyes:      Conjunctiva/sclera: Conjunctivae normal.      Right eye: Right conjunctiva is not injected. Left eye: Left conjunctiva is not injected. Pupils: Pupils are equal.   Cardiovascular:      Rate and Rhythm: Normal rate. Heart sounds: Normal heart sounds. Pulmonary:      Effort: Pulmonary effort is normal. No respiratory distress. Breath sounds: Normal breath sounds and air entry. Abdominal:      General: Abdomen is flat. Bowel sounds are normal.      Palpations: Abdomen is soft. Tenderness: There is no abdominal tenderness. Musculoskeletal:      Cervical back: Normal range of motion.    Skin: General: Skin is warm and dry.      Findings: No rash (on exposed surfaces).   Neurological:      Mental Status: She is alert and oriented to person, place, and time.      Gait: Gait is intact.   Psychiatric:         Mood and Affect: Mood normal.         Speech: Speech normal.         Behavior: Behavior is cooperative.       DIAGNOSTIC RESULTS   Labs:  Abnormal Labs Reviewed - No data to display     IMAGING:  No orders to display     URGENT CARE COURSE:     Vitals:    01/26/23 1304   BP: (!) 152/72   Pulse: 60   Resp: 16   Temp: 97.9 °F (36.6 °C)   TempSrc: Temporal   SpO2: 97%   Weight: 268 lb (121.6 kg)       Medications - No data to display  PROCEDURES:  FINALIMPRESSION      1. Chest pain, atypical    2. Dyspepsia        DISPOSITION/PLAN   DISPOSITION Decision To Transfer 01/26/2023 01:18:48 PM  ED Course as of 01/26/23 1328   Thu Jan 26, 2023   1327 EKG 12 Lead  Reviewed, SB  [HA]      ED Course User Index  [GARCIA] Sophie Rincon, APRN - CNP          After reviewing the patients History of Present illness, Vital Signs,Clinical Findings,Comorbities, and Clinical Data obtained I discussed with the patient or patient representative that there is a very real potential for serious injury / illness and the patient will need to be transfer to a level of higher care for further evaluation and continued care. It was explained that this would provide the best care for the patient.   The patient/patient representative are agreeable to the treatment plan and are agreeable to be transferred therefore, the patient will be transferred to [facility] ED for reevaluation and further management .          Problem List Items Addressed This Visit       Chest pain, atypical - Primary     Other Visit Diagnoses       Dyspepsia                  PATIENT REFERRED TO:  [facility], EMERGENCY DEPT  [address] [facility] [address]  [phone]      GO DIRECTLY TO THE EMERGENCY DEPARTMENT, for further evalation, do not eat or drink prior to  arrival    KELIN Garcia CNP    Please note that some or all of this chart was generated using [employer] voice recognition software. Although every effort was made to ensure the accuracy of this automated transcription, some errors in transcription may have occurred.         KELIN Garcia CNP  01/26/23 [facility] KELIN Ron CNP  01/26/23 [id number]

## 2023-01-26 NOTE — ED NOTES
Jovanna Puentes CNP calls report to [address] regarding transfer     Li Pickard RN  01/26/23 4161

## 2023-01-27 ENCOUNTER — TELEPHONE (OUTPATIENT)
Dept: FAMILY MEDICINE CLINIC | Age: 68
End: 2023-01-27

## 2023-01-27 LAB
EKG ATRIAL RATE: 58 BPM
EKG P AXIS: 43 DEGREES
EKG P-R INTERVAL: 184 MS
EKG Q-T INTERVAL: 420 MS
EKG QRS DURATION: 96 MS
EKG QTC CALCULATION (BAZETT): 412 MS
EKG R AXIS: 2 DEGREES
EKG T AXIS: 52 DEGREES
EKG VENTRICULAR RATE: 58 BPM

## 2023-02-02 ENCOUNTER — OFFICE VISIT (OUTPATIENT)
Dept: FAMILY MEDICINE CLINIC | Age: 68
End: 2023-02-02
Payer: MEDICARE

## 2023-02-02 VITALS
RESPIRATION RATE: 20 BRPM | DIASTOLIC BLOOD PRESSURE: 86 MMHG | HEART RATE: 92 BPM | BODY MASS INDEX: 47.99 KG/M2 | WEIGHT: 270.9 LBS | SYSTOLIC BLOOD PRESSURE: 132 MMHG

## 2023-02-02 DIAGNOSIS — I25.10 CORONARY ARTERY DISEASE INVOLVING NATIVE CORONARY ARTERY OF NATIVE HEART WITHOUT ANGINA PECTORIS: ICD-10-CM

## 2023-02-02 DIAGNOSIS — M25.512 CHRONIC LEFT SHOULDER PAIN: ICD-10-CM

## 2023-02-02 DIAGNOSIS — R07.89 ATYPICAL CHEST PAIN: Primary | ICD-10-CM

## 2023-02-02 DIAGNOSIS — G89.29 CHRONIC LEFT SHOULDER PAIN: ICD-10-CM

## 2023-02-02 DIAGNOSIS — K21.9 GASTROESOPHAGEAL REFLUX DISEASE, UNSPECIFIED WHETHER ESOPHAGITIS PRESENT: ICD-10-CM

## 2023-02-02 PROCEDURE — G8417 CALC BMI ABV UP PARAM F/U: HCPCS | Performed by: FAMILY MEDICINE

## 2023-02-02 PROCEDURE — G8427 DOCREV CUR MEDS BY ELIG CLIN: HCPCS | Performed by: FAMILY MEDICINE

## 2023-02-02 PROCEDURE — 99214 OFFICE O/P EST MOD 30 MIN: CPT | Performed by: FAMILY MEDICINE

## 2023-02-02 PROCEDURE — 1123F ACP DISCUSS/DSCN MKR DOCD: CPT | Performed by: FAMILY MEDICINE

## 2023-02-02 PROCEDURE — 3017F COLORECTAL CA SCREEN DOC REV: CPT | Performed by: FAMILY MEDICINE

## 2023-02-02 PROCEDURE — 1036F TOBACCO NON-USER: CPT | Performed by: FAMILY MEDICINE

## 2023-02-02 PROCEDURE — 1090F PRES/ABSN URINE INCON ASSESS: CPT | Performed by: FAMILY MEDICINE

## 2023-02-02 PROCEDURE — G8399 PT W/DXA RESULTS DOCUMENT: HCPCS | Performed by: FAMILY MEDICINE

## 2023-02-02 PROCEDURE — G8484 FLU IMMUNIZE NO ADMIN: HCPCS | Performed by: FAMILY MEDICINE

## 2023-02-02 RX ORDER — PANTOPRAZOLE SODIUM 40 MG/1
40 TABLET, DELAYED RELEASE ORAL
Qty: 30 TABLET | Refills: 0 | Status: SHIPPED | OUTPATIENT
Start: 2023-02-02 | End: 2023-02-03

## 2023-02-02 SDOH — ECONOMIC STABILITY: FOOD INSECURITY: WITHIN THE PAST 12 MONTHS, YOU WORRIED THAT YOUR FOOD WOULD RUN OUT BEFORE YOU GOT MONEY TO BUY MORE.: NEVER TRUE

## 2023-02-02 SDOH — ECONOMIC STABILITY: INCOME INSECURITY: HOW HARD IS IT FOR YOU TO PAY FOR THE VERY BASICS LIKE FOOD, HOUSING, MEDICAL CARE, AND HEATING?: NOT HARD AT ALL

## 2023-02-02 SDOH — ECONOMIC STABILITY: HOUSING INSECURITY
IN THE LAST 12 MONTHS, WAS THERE A TIME WHEN YOU DID NOT HAVE A STEADY PLACE TO SLEEP OR SLEPT IN A SHELTER (INCLUDING NOW)?: NO

## 2023-02-02 SDOH — ECONOMIC STABILITY: FOOD INSECURITY: WITHIN THE PAST 12 MONTHS, THE FOOD YOU BOUGHT JUST DIDN'T LAST AND YOU DIDN'T HAVE MONEY TO GET MORE.: NEVER TRUE

## 2023-02-02 ASSESSMENT — ENCOUNTER SYMPTOMS
GASTROINTESTINAL NEGATIVE: 1
RESPIRATORY NEGATIVE: 1

## 2023-02-02 ASSESSMENT — PATIENT HEALTH QUESTIONNAIRE - PHQ9
SUM OF ALL RESPONSES TO PHQ QUESTIONS 1-9: 0
1. LITTLE INTEREST OR PLEASURE IN DOING THINGS: 0
SUM OF ALL RESPONSES TO PHQ9 QUESTIONS 1 & 2: 0
2. FEELING DOWN, DEPRESSED OR HOPELESS: 0
SUM OF ALL RESPONSES TO PHQ QUESTIONS 1-9: 0

## 2023-02-02 NOTE — PROGRESS NOTES
Varun Freitas (:  1955) is a 70 y.o. female,Established patient, here for evaluation of the following chief complaint(s):  Follow-up [facility] ED 23)        Subjective   SUBJECTIVE/OBJECTIVE:  HPI:    Chief Complaint   Patient presents with    Follow-up     [facility] ED 23     ED follow up. CHIEF COMPLAINT             Chief Complaint   Patient presents with    Other       Extra burping with some tightness up the middle of my chest with the burping      History obtained from chart review and the patient. HISTORY OF PRESENT ILLNESS    HPI     Varun Freitas is a 70 y.o. female with PMH of CAD, hypothyroidism, IBS, chest pain, abdominal pain, GERD presents to the emergency department for evaluation of intermittent chest pain. Patient stated that a week and a half ago she went to the gym and worked out on the bicycle. Since then she has been burping more often and when she burps she feels a tightness in her chest.  She stated that she only feels this pain when she burps and when she is not burping there is no pain. She denies any nausea, vomiting, shortness of breath, cough, fever at any time. MDM  Number of Diagnoses or Management Options  Chest pain, atypical: new, needed workup  Dyspepsia: new, needed workup  Diagnosis management comments: 65-year-old female coming to the ED for evaluation of chest pain. Patient describes the pain as tightness in the center of her chest which only feels when she burps. Due to the intermittent nature and presents only with burping likelihood of cardiac is low. To confirm this ECG and troponin were ordered both showing negative for MI. Chest x-ray was negative for pneumonia. Patient's blood count and electrolytes were normal.  Discussed findings with patient and shared decision making was made and patient was discharged with strict return precautions and recommended outpatient follow-up with gastroenterology. CP resolved.     GERD not controlled on omeprazole. Has GI appt next week. Due for EGD, CRS. Left shoulder pain chronic in nature, requesting xray. Patient Active Problem List   Diagnosis    CAD (coronary artery disease) 7/2014- mid lad 50 % patent LM< LCX and RCA    Hyperlipidemia LDL goal <70    Hypothyroid    History of total thyroidectomy    GERD (gastroesophageal reflux disease)    PUD (peptic ulcer disease)    IBS (irritable bowel syndrome)    IFG (impaired fasting glucose)    Vitamin D deficiency    SOB (shortness of breath) on exertion    Chest pain    Heart palpitations- intermittent    Abnormal nuclear stress test- anteroior and anteroseptal ischemia    Chest pain, atypical    Abdominal pain    Stress incontinence (female) (male)    Abnormal cardiovascular stress test    GOLDEN (dyspnea on exertion)    Arthritis of left hip    Degeneration of lumbosacral intervertebral disc    Pain, unspecified    Trochanteric bursitis of right hip    Unspecified infectious disease    Unilateral primary osteoarthritis, right hip     Past Surgical History:   Procedure Laterality Date    CARDIAC CATHETERIZATION  2008    CARPAL TUNNEL RELEASE  2008    CHOLECYSTECTOMY      COLONOSCOPY      HAMMER TOE SURGERY Left     HEEL SPUR SURGERY      HIP SURGERY  08/2022    HYSTERECTOMY (CERVIX STATUS UNKNOWN)      age 27    KNEE SURGERY  2009    OVARY REMOVAL Bilateral     age 27    TOTAL HIP ARTHROPLASTY Right 08/2022    TOTAL THYROIDECTOMY      UPPER GASTROINTESTINAL ENDOSCOPY  2009     Social History     Tobacco Use    Smoking status: Never    Smokeless tobacco: Never   Vaping Use    Vaping Use: Never used   Substance Use Topics    Alcohol use: Not Currently    Drug use: No         Review of Systems   Constitutional: Negative. HENT: Negative. Respiratory: Negative. Cardiovascular: Negative. Gastrointestinal: Negative. GERD   Musculoskeletal:  Positive for arthralgias (left shoulder pain). All other systems reviewed and are negative. Objective   Physical Exam  Vitals and nursing note reviewed. Constitutional:       General: She is not in acute distress. Appearance: Normal appearance. She is well-developed. HENT:      Head: Normocephalic and atraumatic. Right Ear: Tympanic membrane normal.      Left Ear: Tympanic membrane normal.   Eyes:      Conjunctiva/sclera: Conjunctivae normal.   Cardiovascular:      Rate and Rhythm: Normal rate and regular rhythm. Heart sounds: Normal heart sounds. No murmur heard. Pulmonary:      Effort: Pulmonary effort is normal.      Breath sounds: Normal breath sounds. No wheezing, rhonchi or rales. Abdominal:      General: There is no distension. Musculoskeletal:      Cervical back: Neck supple. Skin:     General: Skin is warm and dry. Findings: No rash (on exposed surfaces). Neurological:      General: No focal deficit present. Mental Status: She is alert. Psychiatric:         Attention and Perception: Attention normal.         Mood and Affect: Mood normal.         Speech: Speech normal.         Behavior: Behavior normal. Behavior is cooperative. Thought Content: Thought content normal.         Judgment: Judgment normal.             ASSESSMENT/PLAN:  1. Atypical chest pain  2. Coronary artery disease involving native coronary artery of native heart without angina pectoris  3. Gastroesophageal reflux disease, unspecified whether esophagitis present  -     pantoprazole (PROTONIX) 40 MG tablet; Take 1 tablet by mouth daily (with breakfast), Disp-30 tablet, R-0Normal  4. Chronic left shoulder pain  -     XR SHOULDER LEFT (MIN 2 VIEWS); Future    -  ED reports reviewed  -  Chronic medical problems stable  -  Continue current medications with the exception of omeprazole, trial Protonix  -  Follow up with specialists as scheduled, sees GI next week and Cardio in a few weeks  -  Check xray shoulder, will call    Return for as needed.              An electronic signature was used to authenticate this note.     --Levi Camejo, DO

## 2023-02-03 ENCOUNTER — TELEPHONE (OUTPATIENT)
Dept: FAMILY MEDICINE CLINIC | Age: 68
End: 2023-02-03

## 2023-02-03 ENCOUNTER — HOSPITAL ENCOUNTER (OUTPATIENT)
Age: 68
Discharge: HOME | End: 2023-02-03
Payer: MEDICARE

## 2023-02-03 ENCOUNTER — HOSPITAL ENCOUNTER (OUTPATIENT)
Dept: GENERAL RADIOLOGY | Age: 68
Discharge: HOME | End: 2023-02-03
Payer: MEDICARE

## 2023-02-03 DIAGNOSIS — M25.512 CHRONIC LEFT SHOULDER PAIN: ICD-10-CM

## 2023-02-03 DIAGNOSIS — G89.29 CHRONIC LEFT SHOULDER PAIN: ICD-10-CM

## 2023-02-03 PROCEDURE — 73030 X-RAY EXAM OF SHOULDER: CPT

## 2023-02-03 RX ORDER — PANTOPRAZOLE SODIUM 40 MG/1
TABLET, DELAYED RELEASE ORAL
Qty: 90 TABLET | Refills: 3 | Status: SHIPPED | OUTPATIENT
Start: 2023-02-03

## 2023-02-03 RX ORDER — LISINOPRIL 5 MG/1
TABLET ORAL
Qty: 90 TABLET | Refills: 3 | Status: SHIPPED | OUTPATIENT
Start: 2023-02-03

## 2023-02-03 NOTE — TELEPHONE ENCOUNTER
Advised pt that Lisinopril 5 mg was sent to [facility] today. Pt voiced understanding.

## 2023-02-03 NOTE — TELEPHONE ENCOUNTER
----- Message from Jett Finley sent at 2/3/2023 10:57 AM EST -----  Subject: Refill Request    QUESTIONS  Name of Medication? lisinopril (PRINIVIL;ZESTRIL) 5 MG tablet  Patient-reported dosage and instructions? once a day  How many days do you have left? 0  Preferred Pharmacy? [facility] #[id number]  Pharmacy phone number (if available)? [phone]  Additional Information for Provider? Patient was in yesterday and forgot   to refill this med needs asap is out of it.   ---------------------------------------------------------------------------  --------------  [address]  What is the best way for the office to contact you? OK to leave message on   voicemail  Preferred Call Back Phone Number? [phone]  ---------------------------------------------------------------------------  --------------  SCRIPT ANSWERS  Relationship to Patient?  Self

## 2023-02-06 ENCOUNTER — TELEPHONE (OUTPATIENT)
Dept: FAMILY MEDICINE CLINIC | Age: 68
End: 2023-02-06

## 2023-02-06 NOTE — TELEPHONE ENCOUNTER
Called and updated the patient, she does not want to see anyone at [facility]. She states that she goes to [place] for a follow up on her hip on Friday and will take to them about her shoulder. She is asking if she can have an injection or a oral steroid to help with the pain. She states that she did no sleep last night due to the pain. Please advise.

## 2023-02-06 NOTE — TELEPHONE ENCOUNTER
Several areas of arthritis in the shoulder. Recommend [facility] referral if wishes to pursue treatment options.

## 2023-02-08 ENCOUNTER — PROCEDURE VISIT (OUTPATIENT)
Dept: FAMILY MEDICINE CLINIC | Age: 68
End: 2023-02-08

## 2023-02-08 ENCOUNTER — OFFICE VISIT (OUTPATIENT)
Dept: CARDIOLOGY CLINIC | Age: 68
End: 2023-02-08
Payer: MEDICARE

## 2023-02-08 VITALS
OXYGEN SATURATION: 95 % | HEART RATE: 57 BPM | SYSTOLIC BLOOD PRESSURE: 128 MMHG | BODY MASS INDEX: 48.16 KG/M2 | WEIGHT: 271.8 LBS | DIASTOLIC BLOOD PRESSURE: 68 MMHG | HEIGHT: 63 IN

## 2023-02-08 VITALS
WEIGHT: 269.3 LBS | SYSTOLIC BLOOD PRESSURE: 132 MMHG | HEART RATE: 59 BPM | RESPIRATION RATE: 16 BRPM | BODY MASS INDEX: 47.7 KG/M2 | DIASTOLIC BLOOD PRESSURE: 80 MMHG

## 2023-02-08 DIAGNOSIS — I25.10 CORONARY ARTERY DISEASE INVOLVING NATIVE CORONARY ARTERY OF NATIVE HEART WITHOUT ANGINA PECTORIS: Primary | ICD-10-CM

## 2023-02-08 DIAGNOSIS — M19.012 PRIMARY OSTEOARTHRITIS OF LEFT SHOULDER: ICD-10-CM

## 2023-02-08 DIAGNOSIS — E78.5 HYPERLIPIDEMIA LDL GOAL <70: ICD-10-CM

## 2023-02-08 DIAGNOSIS — G89.29 CHRONIC LEFT SHOULDER PAIN: Primary | ICD-10-CM

## 2023-02-08 DIAGNOSIS — M25.512 CHRONIC LEFT SHOULDER PAIN: Primary | ICD-10-CM

## 2023-02-08 PROCEDURE — 1123F ACP DISCUSS/DSCN MKR DOCD: CPT | Performed by: STUDENT IN AN ORGANIZED HEALTH CARE EDUCATION/TRAINING PROGRAM

## 2023-02-08 PROCEDURE — G8484 FLU IMMUNIZE NO ADMIN: HCPCS | Performed by: STUDENT IN AN ORGANIZED HEALTH CARE EDUCATION/TRAINING PROGRAM

## 2023-02-08 PROCEDURE — G8427 DOCREV CUR MEDS BY ELIG CLIN: HCPCS | Performed by: STUDENT IN AN ORGANIZED HEALTH CARE EDUCATION/TRAINING PROGRAM

## 2023-02-08 PROCEDURE — G8417 CALC BMI ABV UP PARAM F/U: HCPCS | Performed by: STUDENT IN AN ORGANIZED HEALTH CARE EDUCATION/TRAINING PROGRAM

## 2023-02-08 PROCEDURE — 1036F TOBACCO NON-USER: CPT | Performed by: STUDENT IN AN ORGANIZED HEALTH CARE EDUCATION/TRAINING PROGRAM

## 2023-02-08 PROCEDURE — G8399 PT W/DXA RESULTS DOCUMENT: HCPCS | Performed by: STUDENT IN AN ORGANIZED HEALTH CARE EDUCATION/TRAINING PROGRAM

## 2023-02-08 PROCEDURE — 1090F PRES/ABSN URINE INCON ASSESS: CPT | Performed by: STUDENT IN AN ORGANIZED HEALTH CARE EDUCATION/TRAINING PROGRAM

## 2023-02-08 PROCEDURE — 99214 OFFICE O/P EST MOD 30 MIN: CPT | Performed by: STUDENT IN AN ORGANIZED HEALTH CARE EDUCATION/TRAINING PROGRAM

## 2023-02-08 PROCEDURE — 3017F COLORECTAL CA SCREEN DOC REV: CPT | Performed by: STUDENT IN AN ORGANIZED HEALTH CARE EDUCATION/TRAINING PROGRAM

## 2023-02-08 RX ORDER — TRIAMCINOLONE ACETONIDE 40 MG/ML
40 INJECTION, SUSPENSION INTRA-ARTICULAR; INTRAMUSCULAR ONCE
Status: SHIPPED | OUTPATIENT
Start: 2023-02-08

## 2023-02-08 ASSESSMENT — ENCOUNTER SYMPTOMS
GASTROINTESTINAL NEGATIVE: 1
RESPIRATORY NEGATIVE: 1

## 2023-02-08 NOTE — PROGRESS NOTES
Sampson Bosworth (:  1955) is a 70 y.o. female,Established patient, here for evaluation of the following chief complaint(s):  Injections (Left shoulder )        Subjective   SUBJECTIVE/OBJECTIVE:  HPI:   Chief Complaint   Patient presents with    Injections     Left shoulder      Pt presents for follow up on her left shoulder. Still bothering her. Xray showed arthritic changes. Denies weakness.       Patient Active Problem List   Diagnosis    CAD (coronary artery disease) 2014- mid lad 50 % patent LM< LCX and RCA    Hyperlipidemia LDL goal <70    Hypothyroid    History of total thyroidectomy    GERD (gastroesophageal reflux disease)    PUD (peptic ulcer disease)    IBS (irritable bowel syndrome)    IFG (impaired fasting glucose)    Vitamin D deficiency    SOB (shortness of breath) on exertion    Chest pain    Heart palpitations- intermittent    Abnormal nuclear stress test- anteroior and anteroseptal ischemia    Chest pain, atypical    Abdominal pain    Stress incontinence (female) (male)    Abnormal cardiovascular stress test    GOLDEN (dyspnea on exertion)    Arthritis of left hip    Degeneration of lumbosacral intervertebral disc    Pain, unspecified    Trochanteric bursitis of right hip    Unspecified infectious disease    Unilateral primary osteoarthritis, right hip     Past Surgical History:   Procedure Laterality Date    CARDIAC CATHETERIZATION      CARPAL TUNNEL RELEASE      CHOLECYSTECTOMY      COLONOSCOPY      HAMMER TOE SURGERY Left     HEEL SPUR SURGERY      HIP SURGERY  2022    HYSTERECTOMY (CERVIX STATUS UNKNOWN)      age 27    KNEE SURGERY  2009    OVARY REMOVAL Bilateral     age 27    TOTAL HIP ARTHROPLASTY Right 2022    TOTAL THYROIDECTOMY      UPPER GASTROINTESTINAL ENDOSCOPY  2009     Social History     Tobacco Use    Smoking status: Never    Smokeless tobacco: Never   Vaping Use    Vaping Use: Never used   Substance Use Topics    Alcohol use: Not Currently    Drug use: No         Review of Systems   Constitutional: Negative. HENT: Negative. Respiratory: Negative. Cardiovascular: Negative. Gastrointestinal: Negative. Musculoskeletal:  Positive for arthralgias (left shoulder pain). All other systems reviewed and are negative. Objective   Physical Exam  Vitals and nursing note reviewed. Constitutional:       General: She is not in acute distress. Appearance: Normal appearance. She is well-developed. HENT:      Head: Normocephalic and atraumatic. Right Ear: Tympanic membrane normal.      Left Ear: Tympanic membrane normal.   Eyes:      Conjunctiva/sclera: Conjunctivae normal.   Cardiovascular:      Rate and Rhythm: Normal rate and regular rhythm. Heart sounds: Normal heart sounds. No murmur heard. Pulmonary:      Effort: Pulmonary effort is normal.      Breath sounds: Normal breath sounds. No wheezing, rhonchi or rales. Abdominal:      General: There is no distension. Musculoskeletal:      Cervical back: Neck supple. Skin:     General: Skin is warm and dry. Findings: No rash (on exposed surfaces). Neurological:      General: No focal deficit present. Mental Status: She is alert. Psychiatric:         Attention and Perception: Attention normal.         Mood and Affect: Mood normal.         Speech: Speech normal.         Behavior: Behavior normal. Behavior is cooperative. Thought Content: Thought content normal.         Judgment: Judgment normal.             ASSESSMENT/PLAN:  1. Chronic left shoulder pain  2. Primary osteoarthritis of left shoulder  -     triamcinolone acetonide (KENALOG-40) injection 40 mg; 40 mg, Intra-artICUlar, ONCE, 1 dose, On Wed 2/8/23 at 6579 - [id number] - NE DRAIN/INJECT LARGE JOINT/BURSA    -  After patient consent obtained, the left shoulder cleansed and the shoulder injected using a posterior approach with Kenalog 40mg mixed with 2cc of lidocaine 1%.       Return if symptoms worsen or fail to improve. Plan referral to Dr. Henry Tapia if no relief at her request.             An electronic signature was used to authenticate this note.     --Nicol Hobbs, DO

## 2023-02-08 NOTE — PROGRESS NOTES
[facility] PROFESSIONAL SERVICES  [facility] [address]   [address] [address]   Dept: [phone]   Dept Fax: [phone]   Loc: [phone]      Chief Complaint   Patient presents with    Follow-up       Cardiologist:  Dr. Yane Ribera  71 yo female presents for f/u. Hx of non obs CAD, mild CMP, 45-50%, HTN, HLD, COVID 2022. No chest pain, breathing has been good. No dizziness. No further arm pain. Nauseous at times. Does have arm pain at times but has diffuse arthritis in many places as well. Is able to tolerate exercise up to 1 hour without issue until she started having her dyspepsia and associated chest discomfort with it. Protonix has improved this and not had any chest pain other than with these episodes.      General:   No fever, no chills, no weight loss, no fatigue  Pulmonary:    No dyspnea, no wheezing  Cardiac:    Denies recent chest pain   GI:     + nausea or vomiting, no abdominal pain  Neuro:     No dizziness or light headedness  Musculoskeletal:  No recent active issues  Extremities:   No edema      Past Medical History:   Diagnosis Date    Arthritis     CAD (coronary artery disease) 3/13/2014    Degeneration of lumbosacral intervertebral disc 10/19/2022    GERD (gastroesophageal reflux disease) 3/13/2014    Hyperlipidemia LDL goal <70 3/13/2014    Hypertension     Hypothyroidism     Irritable bowel syndrome     Stress incontinence (female) (male) 8/29/2018    Ulcer        Allergies   Allergen Reactions    Meloxicam Nausea And Vomiting       Current Outpatient Medications   Medication Sig Dispense Refill    pantoprazole (PROTONIX) 40 MG tablet TAKE 1 TABLET BY MOUTH DAILY WITH BREAKFAST 90 tablet 3    lisinopril (PRINIVIL;ZESTRIL) 5 MG tablet TAKE 1 TABLET BY MOUTH EVERY DAY 90 tablet 3    ondansetron (ZOFRAN-ODT) 4 MG disintegrating tablet DISSOLVE 1 TABLET ON THE TONGUE EVERY 8 HOURS AS NEEDED FOR NAUSEA 20 tablet 0    rOPINIRole (REQUIP) 0.25 MG tablet Take 1 tablet by mouth nightly 90 tablet 3    valACYclovir (VALTREX) 1 g tablet Take 2 tabs BID for 1 day at onset of cold sore 20 tablet 2    fluconazole (DIFLUCAN) 150 MG tablet TAKE 1 TABLET BY MOUTH 1 TIME FOR 1 DOSE. MAY REPEAT IN 3-5 DAYS, IF SYMPTOMS PERSIST OR RECUR 2 tablet 0     MG tablet TAKE 1 TABLET EVERY 6 HOURS AS NEEDED FOR PAIN 360 tablet 0    rivaroxaban (XARELTO) 10 MG TABS tablet Take 10 mg by mouth daily      atorvastatin (LIPITOR) 80 MG tablet TAKE 1 TABLET BY MOUTH DAILY 90 tablet 1    furosemide (LASIX) 20 MG tablet Take 1 tablet by mouth daily 90 tablet 3    metoprolol tartrate (LOPRESSOR) 25 MG tablet TAKE 1/2 TABLET BY MOUTH TWICE DAILY 90 tablet 3    Ascorbic Acid (VITAMIN C PO) Take 1,000 mg by mouth      Multiple Vitamins-Minerals (PRESERVISION AREDS PO) Take by mouth      aspirin 81 MG EC tablet Take 81 mg by mouth daily      Cyanocobalamin (B-12 PO) Take by mouth daily      POTASSIUM PO Take by mouth      Magnesium 400 MG TABS 100 mg       Cholecalciferol (VITAMIN D PO) Take by mouth daily        Current Facility-Administered Medications   Medication Dose Route Frequency Provider Last Rate Last Admin    triamcinolone acetonide (KENALOG-40) injection 40 mg  40 mg Intra-artICUlar Once Qing Kelley, DO           Social History     Socioeconomic History    Marital status:      Spouse name: None    Number of children: 4    Years of education: 16    Highest education level:  Bachelor's degree (e.g., BA, AB, BS)   Tobacco Use    Smoking status: Never    Smokeless tobacco: Never   Vaping Use    Vaping Use: Never used   Substance and Sexual Activity    Alcohol use: Not Currently    Drug use: No    Sexual activity: Never     Social Determinants of Health     Financial Resource Strain: Low Risk     Difficulty of Paying Living Expenses: Not hard at all   Food Insecurity: No Food Insecurity    Worried About Running Out of Food in the Last Year: Never true    Ran Out of Food in the Last Year: Never true   Transportation Needs: Unknown    Lack of Transportation (Non-Medical): No   Housing Stability: Unknown    Unstable Housing in the Last Year: No       Family History   Problem Relation Age of Onset    Arthritis Mother     Other Mother         over weight    Other Father         over weight    Diabetes Brother     Other Brother         over [employer]    Heart Attack Brother     Other Maternal Grandmother         over weight    Other Maternal Grandfather         over weight    Other Paternal Grandmother         over weight    Other Paternal Grandfather         over weight    Diabetes Sister     Coronary Art Dis Sister        Blood pressure 128/68, pulse 57, height 5' 3\" (1.6 m), weight 271 lb 12.8 oz (123.3 kg), SpO2 95 %, not currently breastfeeding. General:   Well developed, well nourished  Lungs:    Clear to auscultation, no rales  Heart:    RRR, Normal S1 S2, No murmur, rubs, or gallops  Abdomen:   Soft, non tender, no organomegalies, positive bowel sounds  Extremities:   No edema, no cyanosis, good peripheral pulses  Neurological:   Awake, alert, oriented. No obvious focal deficits  Musculoskeletal:  No obvious deformities    EKG:          Diagnosis Orders   1. Coronary artery disease involving native coronary artery of native heart without angina pectoris        2. Hyperlipidemia LDL goal <70            No orders of the defined types were placed in this encounter. Assessment/Plan:   Hx of CAD, mild/mod-- her symptoms seem less likely to be cardiac, more likely GI with burping fits and associated discomfort. She did have mild-mod CAD less than 2 years ago. Stress was abnormal but non obs on cath. Could consider stress and/or cath again but in absence of symptoms would defer for now. Stress and/or echo if GI/etc needs clearance  HLD-continue lipitor.    Disposition:     Follow up with Dr Felix Jones as scheduled or sooner if needed

## 2023-02-08 NOTE — PROGRESS NOTES
After obtaining consent,ABN Dr. Barahona  gave, injection of 2 ML of 1% Lidocaine ND: 7304-0333-36, Lot: [id number], Ex date: 5/31/23 mixed with 1 ML of Kenalog 40 mg given in Left shoulder. Patient instructed to report any adverse reaction to me immediately. Patient tolerated injection well.

## 2023-03-14 RX ORDER — FLUCONAZOLE 150 MG/1
TABLET ORAL
Qty: 2 TABLET | Refills: 0 | Status: SHIPPED | OUTPATIENT
Start: 2023-03-14

## 2023-03-23 RX ORDER — IBUPROFEN 600 MG/1
TABLET ORAL
Qty: 360 TABLET | Refills: 0 | Status: SHIPPED | OUTPATIENT
Start: 2023-03-23

## 2023-03-28 ENCOUNTER — OFFICE VISIT (OUTPATIENT)
Dept: FAMILY MEDICINE CLINIC | Age: 68
End: 2023-03-28
Payer: MEDICARE

## 2023-03-28 VITALS
WEIGHT: 265.4 LBS | DIASTOLIC BLOOD PRESSURE: 84 MMHG | RESPIRATION RATE: 16 BRPM | BODY MASS INDEX: 47.01 KG/M2 | HEART RATE: 76 BPM | SYSTOLIC BLOOD PRESSURE: 126 MMHG

## 2023-03-28 DIAGNOSIS — E03.9 ACQUIRED HYPOTHYROIDISM: ICD-10-CM

## 2023-03-28 DIAGNOSIS — H65.93 MEE (MIDDLE EAR EFFUSION), BILATERAL: ICD-10-CM

## 2023-03-28 DIAGNOSIS — J31.0 RHINOSINUSITIS: Primary | ICD-10-CM

## 2023-03-28 DIAGNOSIS — J32.9 RHINOSINUSITIS: Primary | ICD-10-CM

## 2023-03-28 DIAGNOSIS — R11.0 NAUSEA: ICD-10-CM

## 2023-03-28 PROCEDURE — 1090F PRES/ABSN URINE INCON ASSESS: CPT | Performed by: NURSE PRACTITIONER

## 2023-03-28 PROCEDURE — G8427 DOCREV CUR MEDS BY ELIG CLIN: HCPCS | Performed by: NURSE PRACTITIONER

## 2023-03-28 PROCEDURE — G8399 PT W/DXA RESULTS DOCUMENT: HCPCS | Performed by: NURSE PRACTITIONER

## 2023-03-28 PROCEDURE — 1123F ACP DISCUSS/DSCN MKR DOCD: CPT | Performed by: NURSE PRACTITIONER

## 2023-03-28 PROCEDURE — G8484 FLU IMMUNIZE NO ADMIN: HCPCS | Performed by: NURSE PRACTITIONER

## 2023-03-28 PROCEDURE — 1036F TOBACCO NON-USER: CPT | Performed by: NURSE PRACTITIONER

## 2023-03-28 PROCEDURE — G8417 CALC BMI ABV UP PARAM F/U: HCPCS | Performed by: NURSE PRACTITIONER

## 2023-03-28 PROCEDURE — 3017F COLORECTAL CA SCREEN DOC REV: CPT | Performed by: NURSE PRACTITIONER

## 2023-03-28 PROCEDURE — 99213 OFFICE O/P EST LOW 20 MIN: CPT | Performed by: NURSE PRACTITIONER

## 2023-03-28 RX ORDER — ONDANSETRON 4 MG/1
TABLET, ORALLY DISINTEGRATING ORAL
Qty: 30 TABLET | Refills: 0 | Status: SHIPPED | OUTPATIENT
Start: 2023-03-28

## 2023-03-28 RX ORDER — AZITHROMYCIN 250 MG/1
TABLET, FILM COATED ORAL
Qty: 6 TABLET | Refills: 0 | Status: SHIPPED | OUTPATIENT
Start: 2023-03-28 | End: 2023-04-07

## 2023-03-28 ASSESSMENT — ENCOUNTER SYMPTOMS
SORE THROAT: 1
SHORTNESS OF BREATH: 0
ABDOMINAL PAIN: 0
RHINORRHEA: 1
COUGH: 1
NAUSEA: 0

## 2023-03-28 NOTE — PROGRESS NOTES
Negative. Physical Exam  Constitutional:       General: She is not in acute distress. HENT:      Right Ear: A middle ear effusion is present. Left Ear: A middle ear effusion is present. Nose: Mucosal edema, congestion and rhinorrhea present. Mouth/Throat:      Pharynx: Posterior oropharyngeal erythema present. Eyes:      Pupils: Pupils are equal, round, and reactive to light. Cardiovascular:      Rate and Rhythm: Normal rate and regular rhythm. Heart sounds: No murmur heard. Pulmonary:      Effort: Pulmonary effort is normal.      Breath sounds: Normal breath sounds. No wheezing. Abdominal:      General: Bowel sounds are normal. There is no distension. Palpations: Abdomen is soft. Tenderness: There is no abdominal tenderness. Musculoskeletal:         General: No tenderness. Normal range of motion. Cervical back: Normal range of motion and neck supple. Skin:     General: Skin is warm and dry. Findings: No rash. ASSESSMENT/PLAN:   Diagnosis Orders   1. Rhinosinusitis        2. RADHA (middle ear effusion), bilateral        3. Nausea  ondansetron (ZOFRAN-ODT) 4 MG disintegrating tablet      4. Acquired hypothyroidism  TSH    T4, Free            MDM:  Orders as above   Fluids and rest  Recheck Thyroid Levels  Encourage her on accomplishment of 10# lost!!!   - continue exercise and diet  RTO if symptoms worsen or stay the same        An electronic signature was used to authenticate this note.     --KELIN Campo - CNP

## 2023-03-29 LAB
T4 FREE: 1.17 NG/DL (ref 0.8–1.9)
TSH SERPL DL<=0.05 MIU/L-ACNC: 3.75 UIU/ML (ref 0.4–4.1)

## 2023-05-02 ENCOUNTER — TELEPHONE (OUTPATIENT)
Dept: FAMILY MEDICINE CLINIC | Age: 68
End: 2023-05-02

## 2023-05-02 NOTE — TELEPHONE ENCOUNTER
Pt called office wanting to clarify what heartburn medication she is on, because it had changed. Advised pt on her active med list, Pantoprazole is listed. She voiced understanding.

## 2023-05-19 ENCOUNTER — TELEPHONE (OUTPATIENT)
Dept: FAMILY MEDICINE CLINIC | Age: 68
End: 2023-05-19

## 2023-05-19 DIAGNOSIS — E78.00 PURE HYPERCHOLESTEROLEMIA: ICD-10-CM

## 2023-05-19 DIAGNOSIS — R73.01 IFG (IMPAIRED FASTING GLUCOSE): Primary | ICD-10-CM

## 2023-05-19 NOTE — TELEPHONE ENCOUNTER
Maria D Dynes is only FDA approved for DM2 and not for chronic weight management. Due for yearly labs including A1C, will have to check labs prior to considering the Ozempic.

## 2023-05-19 NOTE — TELEPHONE ENCOUNTER
Pt called office stating you had prescribed her Saxenda and it was too costly. She spoke with her insurance and they will cover the [address]. Pt would like this sent to [facility]. If no call back she will check with the pharmacy after 1pm. Please advise.

## 2023-05-19 NOTE — TELEPHONE ENCOUNTER
Called patient and informed. She verbalized understanding. She like to use pathology lab on [address]   She would like to go today if possible she hasn't ate yet today. Informed patient I will call her once the labs are faxed over.

## 2023-05-21 LAB
CHOLESTEROL/HDL RATIO: 3 RATIO
CHOLESTEROL: 198 MG/DL
HDLC SERPL-MCNC: 66 MG/DL
LDL CHOLESTEROL CALCULATED: 114 MG/DL
LDL/HDL RATIO: 1.7 RATIO
TRIGL SERPL-MCNC: 90 MG/DL
VLDLC SERPL CALC-MCNC: 18 MG/DL

## 2023-05-22 LAB
AVERAGE GLUCOSE: 114 MG/DL
HBA1C MFR BLD: 5.6 % (ref 4.2–5.6)

## 2023-05-23 ENCOUNTER — TELEPHONE (OUTPATIENT)
Dept: FAMILY MEDICINE CLINIC | Age: 68
End: 2023-05-23

## 2023-05-23 NOTE — TELEPHONE ENCOUNTER
The patient called and is asking for her labs results and to also inquire about starting on Ozempic. Please advise.

## 2023-05-30 ENCOUNTER — TELEPHONE (OUTPATIENT)
Dept: FAMILY MEDICINE CLINIC | Age: 68
End: 2023-05-30

## 2023-05-30 ENCOUNTER — APPOINTMENT (OUTPATIENT)
Dept: GENERAL RADIOLOGY | Age: 68
End: 2023-05-30
Payer: MEDICARE

## 2023-05-30 ENCOUNTER — APPOINTMENT (OUTPATIENT)
Dept: CT IMAGING | Age: 68
End: 2023-05-30
Payer: MEDICARE

## 2023-05-30 ENCOUNTER — HOSPITAL ENCOUNTER (EMERGENCY)
Age: 68
Discharge: HOME | End: 2023-05-30
Attending: EMERGENCY MEDICINE
Payer: MEDICARE

## 2023-05-30 VITALS
HEIGHT: 65 IN | WEIGHT: 270 LBS | OXYGEN SATURATION: 97 % | RESPIRATION RATE: 13 BRPM | SYSTOLIC BLOOD PRESSURE: 156 MMHG | DIASTOLIC BLOOD PRESSURE: 94 MMHG | TEMPERATURE: 97.6 F | HEART RATE: 61 BPM | BODY MASS INDEX: 44.98 KG/M2

## 2023-05-30 DIAGNOSIS — H81.10 BENIGN PAROXYSMAL POSITIONAL VERTIGO, UNSPECIFIED LATERALITY: Primary | ICD-10-CM

## 2023-05-30 DIAGNOSIS — E66.01 MORBID OBESITY (HCC): ICD-10-CM

## 2023-05-30 LAB
ALBUMIN SERPL BCG-MCNC: 4.1 G/DL (ref 3.5–5.1)
ALP SERPL-CCNC: 108 U/L (ref 38–126)
ALT SERPL W/O P-5'-P-CCNC: 15 U/L (ref 11–66)
ANION GAP SERPL CALC-SCNC: 15 MEQ/L (ref 8–16)
AST SERPL-CCNC: 19 U/L (ref 5–40)
BASOPHILS ABSOLUTE: 0 THOU/MM3 (ref 0–0.1)
BASOPHILS NFR BLD AUTO: 0.5 %
BILIRUB SERPL-MCNC: 0.3 MG/DL (ref 0.3–1.2)
BUN SERPL-MCNC: 22 MG/DL (ref 7–22)
CALCIUM SERPL-MCNC: 9.2 MG/DL (ref 8.5–10.5)
CHLORIDE SERPL-SCNC: 101 MEQ/L (ref 98–111)
CO2 SERPL-SCNC: 24 MEQ/L (ref 23–33)
CREAT SERPL-MCNC: 0.8 MG/DL (ref 0.4–1.2)
DEPRECATED RDW RBC AUTO: 44.9 FL (ref 35–45)
EOSINOPHIL NFR BLD AUTO: 1.1 %
EOSINOPHILS ABSOLUTE: 0.1 THOU/MM3 (ref 0–0.4)
ERYTHROCYTE [DISTWIDTH] IN BLOOD BY AUTOMATED COUNT: 13.2 % (ref 11.5–14.5)
GFR SERPL CREATININE-BSD FRML MDRD: > 60 ML/MIN/1.73M2
GLUCOSE SERPL-MCNC: 92 MG/DL (ref 70–108)
HCT VFR BLD AUTO: 39.8 % (ref 37–47)
HGB BLD-MCNC: 12.6 GM/DL (ref 12–16)
IMM GRANULOCYTES # BLD AUTO: 0.04 THOU/MM3 (ref 0–0.07)
IMM GRANULOCYTES NFR BLD AUTO: 0.5 %
LYMPHOCYTES ABSOLUTE: 1.4 THOU/MM3 (ref 1–4.8)
LYMPHOCYTES NFR BLD AUTO: 17.2 %
MCH RBC QN AUTO: 29.4 PG (ref 26–33)
MCHC RBC AUTO-ENTMCNC: 31.7 GM/DL (ref 32.2–35.5)
MCV RBC AUTO: 92.8 FL (ref 81–99)
MONOCYTES ABSOLUTE: 0.6 THOU/MM3 (ref 0.4–1.3)
MONOCYTES NFR BLD AUTO: 6.9 %
NEUTROPHILS NFR BLD AUTO: 73.8 %
NRBC BLD AUTO-RTO: 0 /100 WBC
NT-PROBNP SERPL IA-MCNC: 207.8 PG/ML (ref 0–124)
OSMOLALITY SERPL CALC.SUM OF ELEC: 282.4 MOSMOL/KG (ref 275–300)
PLATELET # BLD AUTO: 296 THOU/MM3 (ref 130–400)
PMV BLD AUTO: 9.3 FL (ref 9.4–12.4)
POTASSIUM SERPL-SCNC: 3.6 MEQ/L (ref 3.5–5.2)
PROT SERPL-MCNC: 6.9 G/DL (ref 6.1–8)
RBC # BLD AUTO: 4.29 MILL/MM3 (ref 4.2–5.4)
SEGMENTED NEUTROPHILS ABSOLUTE COUNT: 6.2 THOU/MM3 (ref 1.8–7.7)
SODIUM SERPL-SCNC: 140 MEQ/L (ref 135–145)
TROPONIN T: < 0.01 NG/ML
WBC # BLD AUTO: 8.4 THOU/MM3 (ref 4.8–10.8)

## 2023-05-30 PROCEDURE — 80053 COMPREHEN METABOLIC PANEL: CPT

## 2023-05-30 PROCEDURE — 6360000002 HC RX W HCPCS: Performed by: EMERGENCY MEDICINE

## 2023-05-30 PROCEDURE — 6370000000 HC RX 637 (ALT 250 FOR IP): Performed by: EMERGENCY MEDICINE

## 2023-05-30 PROCEDURE — 85025 COMPLETE CBC W/AUTO DIFF WBC: CPT

## 2023-05-30 PROCEDURE — 83880 ASSAY OF NATRIURETIC PEPTIDE: CPT

## 2023-05-30 PROCEDURE — 71045 X-RAY EXAM CHEST 1 VIEW: CPT

## 2023-05-30 PROCEDURE — 99285 EMERGENCY DEPT VISIT HI MDM: CPT

## 2023-05-30 PROCEDURE — 2580000003 HC RX 258: Performed by: EMERGENCY MEDICINE

## 2023-05-30 PROCEDURE — 84484 ASSAY OF TROPONIN QUANT: CPT

## 2023-05-30 PROCEDURE — 93005 ELECTROCARDIOGRAM TRACING: CPT | Performed by: EMERGENCY MEDICINE

## 2023-05-30 PROCEDURE — 70450 CT HEAD/BRAIN W/O DYE: CPT

## 2023-05-30 PROCEDURE — 96374 THER/PROPH/DIAG INJ IV PUSH: CPT

## 2023-05-30 PROCEDURE — 36415 COLL VENOUS BLD VENIPUNCTURE: CPT

## 2023-05-30 RX ORDER — 0.9 % SODIUM CHLORIDE 0.9 %
1000 INTRAVENOUS SOLUTION INTRAVENOUS ONCE
Status: COMPLETED | OUTPATIENT
Start: 2023-05-30 | End: 2023-05-30

## 2023-05-30 RX ORDER — ONDANSETRON 2 MG/ML
4 INJECTION INTRAMUSCULAR; INTRAVENOUS ONCE
Status: COMPLETED | OUTPATIENT
Start: 2023-05-30 | End: 2023-05-30

## 2023-05-30 RX ORDER — LIRAGLUTIDE 6 MG/ML
INJECTION, SOLUTION SUBCUTANEOUS
Qty: 15 ML | Refills: 0 | Status: SHIPPED | OUTPATIENT
Start: 2023-05-30

## 2023-05-30 RX ORDER — MECLIZINE HYDROCHLORIDE 25 MG/1
25 TABLET ORAL 3 TIMES DAILY PRN
Qty: 15 TABLET | Refills: 0 | Status: SHIPPED | OUTPATIENT
Start: 2023-05-30 | End: 2023-06-09

## 2023-05-30 RX ORDER — MECLIZINE HCL 25MG 25 MG/1
25 TABLET, CHEWABLE ORAL ONCE
Status: COMPLETED | OUTPATIENT
Start: 2023-05-30 | End: 2023-05-30

## 2023-05-30 RX ORDER — DIAZEPAM 5 MG/1
5 TABLET ORAL ONCE
Status: COMPLETED | OUTPATIENT
Start: 2023-05-30 | End: 2023-05-30

## 2023-05-30 RX ADMIN — DIAZEPAM 5 MG: 5 TABLET ORAL at 21:04

## 2023-05-30 RX ADMIN — ONDANSETRON 4 MG: 2 INJECTION INTRAMUSCULAR; INTRAVENOUS at 21:04

## 2023-05-30 RX ADMIN — MECLIZINE HYDROCHLORIDE 25 MG: 25 TABLET, CHEWABLE ORAL at 21:04

## 2023-05-30 RX ADMIN — SODIUM CHLORIDE 1000 ML: 9 INJECTION, SOLUTION INTRAVENOUS at 21:13

## 2023-05-30 ASSESSMENT — PAIN - FUNCTIONAL ASSESSMENT: PAIN_FUNCTIONAL_ASSESSMENT: NONE - DENIES PAIN

## 2023-05-30 NOTE — TELEPHONE ENCOUNTER
Nicole with [facility] called office stating pt is trying to fill the Saxenda we prescribed 12/6/2022. Pt never picked the rx up at that time. Pt is not wanting just one pen that only lasts 17days, she is requesting a box of 5 pens. If pt is to start this med, please send new rx with requested quantity if appropriate. Please advise.

## 2023-05-30 NOTE — ED NOTES
Report received from [facility]. Patient resting in bed. Respirations easy and unlabored. No distress noted. Call light within reach. Awaiting further orders at this time.      Severiano Varela RN  05/30/23 [id number]

## 2023-05-30 NOTE — ED NOTES
Pt presents to the ED via EMS from work after patient became pale, weak and nauseous. Pt states she just started intermittent fasting and the only thing she had today was ice cream. On arrival patient appeared pale and diaphoretic. Pt dry heaved on arrival. Pt respirations are even and unlabored.       Melvin Scanlon RN  05/30/23 [id number]

## 2023-05-31 ENCOUNTER — TELEPHONE (OUTPATIENT)
Dept: FAMILY MEDICINE CLINIC | Age: 68
End: 2023-05-31

## 2023-05-31 DIAGNOSIS — K21.9 GASTROESOPHAGEAL REFLUX DISEASE, UNSPECIFIED WHETHER ESOPHAGITIS PRESENT: ICD-10-CM

## 2023-05-31 LAB
EKG ATRIAL RATE: 61 BPM
EKG P AXIS: 48 DEGREES
EKG P-R INTERVAL: 184 MS
EKG Q-T INTERVAL: 426 MS
EKG QRS DURATION: 100 MS
EKG QTC CALCULATION (BAZETT): 428 MS
EKG R AXIS: -3 DEGREES
EKG T AXIS: 64 DEGREES
EKG VENTRICULAR RATE: 61 BPM

## 2023-05-31 PROCEDURE — 93010 ELECTROCARDIOGRAM REPORT: CPT | Performed by: NUCLEAR MEDICINE

## 2023-05-31 NOTE — ED NOTES
Patient resting in bed. Respirations easy and unlabored. No distress noted. Call light within reach.        Brayden Mathews RN  05/30/23 8393

## 2023-05-31 NOTE — ED PROVIDER NOTES
PM      OUTPATIENT FOLLOW UP THE PATIENT:  DO Loida Carbone, [address]  [phone]    In 3 days  ED discharge follow up    DISCHARGE MEDICATIONS:(None if blank)  New Prescriptions    MECLIZINE (ANTIVERT) 25 MG TABLET    Take 1 tablet by mouth 3 times daily as needed for Dizziness         MD Julieta Rooney MD  05/30/23 5404

## 2023-06-01 RX ORDER — PANTOPRAZOLE SODIUM 40 MG/1
TABLET, DELAYED RELEASE ORAL
Qty: 90 TABLET | Refills: 3 | Status: SHIPPED | OUTPATIENT
Start: 2023-06-01

## 2023-06-08 ENCOUNTER — OFFICE VISIT (OUTPATIENT)
Dept: FAMILY MEDICINE CLINIC | Age: 68
End: 2023-06-08
Payer: MEDICARE

## 2023-06-08 VITALS
BODY MASS INDEX: 44.02 KG/M2 | DIASTOLIC BLOOD PRESSURE: 72 MMHG | SYSTOLIC BLOOD PRESSURE: 114 MMHG | WEIGHT: 264.5 LBS | HEART RATE: 80 BPM | RESPIRATION RATE: 16 BRPM

## 2023-06-08 DIAGNOSIS — H81.13 BPPV (BENIGN PAROXYSMAL POSITIONAL VERTIGO), BILATERAL: Primary | ICD-10-CM

## 2023-06-08 PROCEDURE — G8417 CALC BMI ABV UP PARAM F/U: HCPCS | Performed by: NURSE PRACTITIONER

## 2023-06-08 PROCEDURE — G8399 PT W/DXA RESULTS DOCUMENT: HCPCS | Performed by: NURSE PRACTITIONER

## 2023-06-08 PROCEDURE — 3017F COLORECTAL CA SCREEN DOC REV: CPT | Performed by: NURSE PRACTITIONER

## 2023-06-08 PROCEDURE — 1036F TOBACCO NON-USER: CPT | Performed by: NURSE PRACTITIONER

## 2023-06-08 PROCEDURE — 1090F PRES/ABSN URINE INCON ASSESS: CPT | Performed by: NURSE PRACTITIONER

## 2023-06-08 PROCEDURE — 1123F ACP DISCUSS/DSCN MKR DOCD: CPT | Performed by: NURSE PRACTITIONER

## 2023-06-08 PROCEDURE — G8427 DOCREV CUR MEDS BY ELIG CLIN: HCPCS | Performed by: NURSE PRACTITIONER

## 2023-06-08 PROCEDURE — 99213 OFFICE O/P EST LOW 20 MIN: CPT | Performed by: NURSE PRACTITIONER

## 2023-06-08 NOTE — PROGRESS NOTES
Kenroy Christianson (1955) 70 y.o. female here for evaluation of the following chief complaint(s):      HPI:  Chief Complaint   Patient presents with    Follow-up     [address] [place] [address] ED 5/30/2023     Seen in ED 5/30/23:    HISTORY OF PRESENT ILLNESS   Kenroy Christianson is a 70 y.o. female who presents to Emergency Department with Nausea and Fatigue     Patient works at [employer]. Around 6 PM, she suddenly felt dizzy, diaphoretic, and room was spinning  Patient states she started intermittent fasting recently and that she only had ice cream today. No headache. No neck pain. No chest pain. No SOB. She has been having dry heaves over last 2 hours. No abdominal pain. No slurred speech, no blurred vision. No extremity weakness. No gait difficulty. Similar episode 3 years ago, diagnosed BPPV. Vitals:    06/08/23 1357   BP: 114/72   Pulse: 80   Resp: 16       CT Head and CXR were NEG. NO issues since that time. Has episodes off and on. Sitting, laying or moving. Head movements.      Patient Active Problem List   Diagnosis    CAD (coronary artery disease) 7/2014- mid lad 50 % patent LM< LCX and RCA    Hyperlipidemia LDL goal <70    Hypothyroid    History of total thyroidectomy    GERD (gastroesophageal reflux disease)    PUD (peptic ulcer disease)    IBS (irritable bowel syndrome)    IFG (impaired fasting glucose)    Vitamin D deficiency    SOB (shortness of breath) on exertion    Chest pain    Heart palpitations- intermittent    Abnormal nuclear stress test- anteroior and anteroseptal ischemia    Chest pain, atypical    Abdominal pain    Stress incontinence (female) (male)    Abnormal cardiovascular stress test    GOLDEN (dyspnea on exertion)    Arthritis of left hip    Degeneration of lumbosacral intervertebral disc    Pain, unspecified    Trochanteric bursitis of right hip    Unspecified infectious disease    Unilateral primary osteoarthritis, right hip       SUBJECTIVE/OBJECTIVE:  Review of Systems   Constitutional:  Negative

## 2023-06-10 ASSESSMENT — ENCOUNTER SYMPTOMS
COUGH: 0
ABDOMINAL PAIN: 0
SHORTNESS OF BREATH: 0
NAUSEA: 0

## 2023-06-29 DIAGNOSIS — I25.10 CORONARY ARTERY DISEASE INVOLVING NATIVE CORONARY ARTERY OF NATIVE HEART WITHOUT ANGINA PECTORIS: ICD-10-CM

## 2023-06-29 DIAGNOSIS — K21.9 GASTROESOPHAGEAL REFLUX DISEASE, UNSPECIFIED WHETHER ESOPHAGITIS PRESENT: ICD-10-CM

## 2023-06-29 RX ORDER — ATORVASTATIN CALCIUM 80 MG/1
80 TABLET, FILM COATED ORAL DAILY
Qty: 90 TABLET | Refills: 1 | Status: SHIPPED | OUTPATIENT
Start: 2023-06-29

## 2023-06-29 RX ORDER — PANTOPRAZOLE SODIUM 40 MG/1
40 TABLET, DELAYED RELEASE ORAL DAILY
Qty: 90 TABLET | Refills: 3 | Status: SHIPPED | OUTPATIENT
Start: 2023-06-29

## 2023-06-29 RX ORDER — LISINOPRIL 5 MG/1
5 TABLET ORAL DAILY
Qty: 90 TABLET | Refills: 3 | Status: SHIPPED | OUTPATIENT
Start: 2023-06-29

## 2023-07-06 DIAGNOSIS — I25.10 CORONARY ARTERY DISEASE INVOLVING NATIVE CORONARY ARTERY OF NATIVE HEART WITHOUT ANGINA PECTORIS: ICD-10-CM

## 2023-07-07 ENCOUNTER — HOSPITAL ENCOUNTER (EMERGENCY)
Age: 68
Discharge: HOME | End: 2023-07-07
Payer: MEDICARE

## 2023-07-07 VITALS
OXYGEN SATURATION: 96 % | SYSTOLIC BLOOD PRESSURE: 168 MMHG | DIASTOLIC BLOOD PRESSURE: 87 MMHG | HEART RATE: 80 BPM | RESPIRATION RATE: 16 BRPM | TEMPERATURE: 97 F

## 2023-07-07 DIAGNOSIS — H65.01 NON-RECURRENT ACUTE SEROUS OTITIS MEDIA OF RIGHT EAR: Primary | ICD-10-CM

## 2023-07-07 DIAGNOSIS — J02.9 ACUTE VIRAL PHARYNGITIS: ICD-10-CM

## 2023-07-07 LAB — S PYO AG THROAT QL: NEGATIVE

## 2023-07-07 PROCEDURE — 87651 STREP A DNA AMP PROBE: CPT

## 2023-07-07 PROCEDURE — 99213 OFFICE O/P EST LOW 20 MIN: CPT | Performed by: NURSE PRACTITIONER

## 2023-07-07 PROCEDURE — 99213 OFFICE O/P EST LOW 20 MIN: CPT

## 2023-07-07 RX ORDER — AMOXICILLIN AND CLAVULANATE POTASSIUM 875; 125 MG/1; MG/1
1 TABLET, FILM COATED ORAL 2 TIMES DAILY
Qty: 20 TABLET | Refills: 0 | Status: SHIPPED | OUTPATIENT
Start: 2023-07-07 | End: 2023-07-17

## 2023-07-07 ASSESSMENT — ENCOUNTER SYMPTOMS
SORE THROAT: 1
NAUSEA: 0
TROUBLE SWALLOWING: 0
VOMITING: 0
DIARRHEA: 0
SHORTNESS OF BREATH: 0
RHINORRHEA: 0
EYE DISCHARGE: 0
COUGH: 0
EYE REDNESS: 0

## 2023-07-07 ASSESSMENT — PAIN - FUNCTIONAL ASSESSMENT: PAIN_FUNCTIONAL_ASSESSMENT: NONE - DENIES PAIN

## 2023-07-07 NOTE — ED PROVIDER NOTES
TM intact. No otitis externa. Medication as prescribed. Over-the-counter treatment as needed. If any distress go to ER. PATIENT REFERRED TO:  Jennifer Cortes DO  [address] [place] [address] Drive, [address] [place] [address]  [phone]      Follow up as needed. Medication as prescribed. OTC medication as needed. If worse return or go to ER.     DISCHARGE MEDICATIONS:  Discharge Medication List as of 7/7/2023 12:55 PM        START taking these medications    Details   amoxicillin-clavulanate (AUGMENTIN) 875-125 MG per tablet Take 1 tablet by mouth 2 times daily for 10 days, Disp-20 tablet, R-0Normal           Discharge Medication List as of 7/7/2023 12:55 PM          [facility] KELIN Carias CNP  07/07/23 4066

## 2023-07-10 ENCOUNTER — TELEPHONE (OUTPATIENT)
Dept: FAMILY MEDICINE CLINIC | Age: 68
End: 2023-07-10

## 2023-07-11 ENCOUNTER — TELEPHONE (OUTPATIENT)
Dept: PHARMACY | Facility: CLINIC | Age: 68
End: 2023-07-11

## 2023-07-11 NOTE — TELEPHONE ENCOUNTER
POPULATION HEALTH CLINICAL PHARMACY: STATIN THERAPY REVIEW  Identified statin use in persons with cardiovascular disease care gap per [facility]. Records dated: 6/16/23. Last Visit: 6/8/23    Future Appointments   Date Time Provider [address]   10/31/2023 10:00 AM Breezy Parra MD N [facility]     Patient found in [facility] and is not currently eligible for CMR or TIP    ASSESSMENT of Statin in Persons with Cardiovascular Disease Care Krish Pickard  has been identified as having a diagnosis for clinical ASCVD or event (e.g., inpatient hospitalization for MI, CABG, PCI or other revascularization procedures) in the measurement year and not currently filling a moderate or high intensity statin. Patients included in this care gap are males age 18-72 and females age 37-78. Currently Prescribed a statin: yes - atorvastatin 80 mg tablets  Per Reconcile Dispense History:    Dispensed Days Supply Quantity Provider Pharmacy   atorvastatin 80 mg tablet 06/29/2023 90 90 tablet Lg Goode DO [facility] Mail D... ATORVASTATIN 80MG TABLETS 12/06/2022 90 90 each Breezy Parra MD [address] [place] [address] #. .. ATORVASTATIN 80MG TABLETS 08/30/2022 90 90 each Breezy Parra MD [address] [place] [address] #. ..        Allergies   Allergen Reactions    Meloxicam Nausea And Vomiting       Lab Results   Component Value Date    CHOL 198 05/20/2023    TRIG 90 05/20/2023    HDL 66 05/20/2023    LDLCALC 114 (H) 05/20/2023    LDLDIRECT 87 04/29/2016     ALT   Date Value Ref Range Status   05/30/2023 15 11 - 66 U/L Final     Comment:     Performed at [address], [address]     AST   Date Value Ref Range Status   05/30/2023 19 5 - 40 U/L Final       The 10-year ASCVD risk score (Ricardo POZO, et al., 2019) is: 13.5%    Values used to calculate the score:      Age: 70 years      Sex: Female      Is Non- : No      Diabetic: No      Tobacco smoker: No

## 2023-08-16 RX ORDER — IBUPROFEN 600 MG/1
TABLET ORAL
Qty: 360 TABLET | Refills: 0 | Status: SHIPPED | OUTPATIENT
Start: 2023-08-16

## 2023-09-02 ENCOUNTER — HOSPITAL ENCOUNTER (EMERGENCY)
Age: 68
Discharge: HOME | End: 2023-09-02
Payer: MEDICARE

## 2023-09-02 VITALS
OXYGEN SATURATION: 100 % | TEMPERATURE: 98 F | DIASTOLIC BLOOD PRESSURE: 52 MMHG | HEART RATE: 75 BPM | SYSTOLIC BLOOD PRESSURE: 116 MMHG | RESPIRATION RATE: 20 BRPM

## 2023-09-02 DIAGNOSIS — H66.91 RIGHT OTITIS MEDIA, UNSPECIFIED OTITIS MEDIA TYPE: Primary | ICD-10-CM

## 2023-09-02 LAB — S PYO AG THROAT QL: NEGATIVE

## 2023-09-02 PROCEDURE — 87651 STREP A DNA AMP PROBE: CPT

## 2023-09-02 PROCEDURE — 99213 OFFICE O/P EST LOW 20 MIN: CPT | Performed by: NURSE PRACTITIONER

## 2023-09-02 PROCEDURE — 99213 OFFICE O/P EST LOW 20 MIN: CPT

## 2023-09-02 RX ORDER — AMOXICILLIN 500 MG/1
500 CAPSULE ORAL 2 TIMES DAILY
Qty: 14 CAPSULE | Refills: 0 | Status: SHIPPED | OUTPATIENT
Start: 2023-09-02 | End: 2023-09-09

## 2023-09-02 ASSESSMENT — ENCOUNTER SYMPTOMS
SORE THROAT: 1
STRIDOR: 0
APNEA: 0
COUGH: 1
CHOKING: 0
CHEST TIGHTNESS: 0
RHINORRHEA: 1
WHEEZING: 0
SHORTNESS OF BREATH: 0
SWOLLEN GLANDS: 0
SINUS PAIN: 0

## 2023-09-02 NOTE — ED PROVIDER NOTES
[address]  Urgent Care Encounter      CHIEF COMPLAINT       Chief Complaint   Patient presents with    Pharyngitis       Nurses Notes reviewed and I agree except as noted in the HPI. HISTORY OFPRESENT ILLNESS   Elba Orellana is a 70 y.o. The history is provided by the patient. No  was used. URI  Presenting symptoms: congestion, cough, ear pain, fatigue, rhinorrhea and sore throat    Presenting symptoms: no facial pain and no fever    Severity:  Severe  Onset quality:  Gradual  Duration:  1 week  Timing:  Constant  Progression:  Worsening  Chronicity:  New  Relieved by:  Nothing  Worsened by:  Certain positions  Ineffective treatments:  OTC medications  Associated symptoms: headaches    Associated symptoms: no arthralgias, no myalgias, no neck pain, no sinus pain, no sneezing, no swollen glands and no wheezing    Risk factors: not elderly, no chronic cardiac disease, no chronic kidney disease, no chronic respiratory disease, no diabetes mellitus, no immunosuppression, no recent illness, no recent travel and no sick contacts      REVIEW OF SYSTEMS     Review of Systems   Constitutional:  Positive for fatigue. Negative for activity change, appetite change, chills, diaphoresis and fever. HENT:  Positive for congestion, ear pain, postnasal drip, rhinorrhea and sore throat. Negative for sinus pain and sneezing. Respiratory:  Positive for cough. Negative for apnea, choking, chest tightness, shortness of breath, wheezing and stridor. Cardiovascular:  Negative for chest pain, palpitations and leg swelling. Musculoskeletal:  Negative for arthralgias, myalgias and neck pain. Neurological:  Positive for headaches.      PAST MEDICAL HISTORY         Diagnosis Date    Arthritis     CAD (coronary artery disease) 3/13/2014    Degeneration of lumbosacral intervertebral disc 10/19/2022    GERD (gastroesophageal reflux disease) 3/13/2014    Hyperlipidemia LDL goal <70 3/13/2014

## 2023-09-04 ENCOUNTER — PATIENT MESSAGE (OUTPATIENT)
Dept: FAMILY MEDICINE CLINIC | Age: 68
End: 2023-09-04

## 2023-09-06 ENCOUNTER — OFFICE VISIT (OUTPATIENT)
Dept: FAMILY MEDICINE CLINIC | Age: 68
End: 2023-09-06
Payer: MEDICARE

## 2023-09-06 VITALS
BODY MASS INDEX: 44.05 KG/M2 | DIASTOLIC BLOOD PRESSURE: 88 MMHG | RESPIRATION RATE: 16 BRPM | WEIGHT: 264.7 LBS | SYSTOLIC BLOOD PRESSURE: 136 MMHG | HEART RATE: 72 BPM

## 2023-09-06 DIAGNOSIS — M25.512 CHRONIC LEFT SHOULDER PAIN: Primary | ICD-10-CM

## 2023-09-06 DIAGNOSIS — G89.29 CHRONIC LEFT SHOULDER PAIN: Primary | ICD-10-CM

## 2023-09-06 PROCEDURE — G8417 CALC BMI ABV UP PARAM F/U: HCPCS | Performed by: FAMILY MEDICINE

## 2023-09-06 PROCEDURE — 1090F PRES/ABSN URINE INCON ASSESS: CPT | Performed by: FAMILY MEDICINE

## 2023-09-06 PROCEDURE — G8399 PT W/DXA RESULTS DOCUMENT: HCPCS | Performed by: FAMILY MEDICINE

## 2023-09-06 PROCEDURE — G8427 DOCREV CUR MEDS BY ELIG CLIN: HCPCS | Performed by: FAMILY MEDICINE

## 2023-09-06 PROCEDURE — 1036F TOBACCO NON-USER: CPT | Performed by: FAMILY MEDICINE

## 2023-09-06 PROCEDURE — 1123F ACP DISCUSS/DSCN MKR DOCD: CPT | Performed by: FAMILY MEDICINE

## 2023-09-06 PROCEDURE — 3017F COLORECTAL CA SCREEN DOC REV: CPT | Performed by: FAMILY MEDICINE

## 2023-09-06 PROCEDURE — 20610 DRAIN/INJ JOINT/BURSA W/O US: CPT | Performed by: FAMILY MEDICINE

## 2023-09-06 PROCEDURE — 99213 OFFICE O/P EST LOW 20 MIN: CPT | Performed by: FAMILY MEDICINE

## 2023-09-06 RX ORDER — METHYLPREDNISOLONE ACETATE 80 MG/ML
80 INJECTION, SUSPENSION INTRA-ARTICULAR; INTRALESIONAL; INTRAMUSCULAR; SOFT TISSUE ONCE
Status: COMPLETED | OUTPATIENT
Start: 2023-09-06 | End: 2023-09-06

## 2023-09-06 RX ADMIN — METHYLPREDNISOLONE ACETATE 80 MG: 80 INJECTION, SUSPENSION INTRA-ARTICULAR; INTRALESIONAL; INTRAMUSCULAR; SOFT TISSUE at 14:33

## 2023-09-06 ASSESSMENT — ENCOUNTER SYMPTOMS
GASTROINTESTINAL NEGATIVE: 1
RESPIRATORY NEGATIVE: 1

## 2023-09-06 NOTE — PROGRESS NOTES
Administrations This Visit       methylPREDNISolone acetate (DEPO-MEDROL) injection 80 mg       Admin Date  09/06/2023  14:33 Action  Given Dose  80 mg Route  Intra-artICUlar Site  Shoulder Left Administered By  Manuel Díaz, [address] ([address] [place] [address])    Ordering Provider: Anup Ruby DO    NDC: [address]-1    Lot#: [id number]    : Squee    Patient Supplied?: No    Comments: Mixed w/Lidocaine 1% 2ml lot#[id number] Exp 2/1/2025    injection drawn up and given by Dr. Ida Flores

## 2023-09-06 NOTE — PROGRESS NOTES
Gokul Copeland (:  1955) is a 70 y.o. female,Established patient, here for evaluation of the following chief complaint(s):  Shoulder Pain (Left shoulder pain, no injury noted)          Subjective   SUBJECTIVE/OBJECTIVE:  HPI  Chief Complaint   Patient presents with    Shoulder Pain     Left shoulder pain, no injury noted     Pt presents for left shoulder pain that is chronic in nature. Last injection in Feb, has done well until last week.     Patient Active Problem List   Diagnosis    CAD (coronary artery disease) 2014- mid lad 50 % patent LM< LCX and RCA    Hyperlipidemia LDL goal <70    Hypothyroid    History of total thyroidectomy    GERD (gastroesophageal reflux disease)    PUD (peptic ulcer disease)    IBS (irritable bowel syndrome)    IFG (impaired fasting glucose)    Vitamin D deficiency    SOB (shortness of breath) on exertion    Chest pain    Heart palpitations- intermittent    Abnormal nuclear stress test- anteroior and anteroseptal ischemia    Chest pain, atypical    Abdominal pain    Stress incontinence (female) (male)    Abnormal cardiovascular stress test    GOLDEN (dyspnea on exertion)    Arthritis of left hip    Degeneration of lumbosacral intervertebral disc    Pain, unspecified    Trochanteric bursitis of right hip    Unspecified infectious disease    Unilateral primary osteoarthritis, right hip       Current Outpatient Medications   Medication Sig Dispense Refill    amoxicillin (AMOXIL) 500 MG capsule Take 1 capsule by mouth 2 times daily for 7 days 14 capsule 0     MG tablet TAKE 1 TABLET EVERY 6 HOURS AS NEEDED FOR PAIN 360 tablet 0    metoprolol tartrate (LOPRESSOR) 25 MG tablet TAKE 1/2 TABLET TWICE DAILY 90 tablet 3    atorvastatin (LIPITOR) 80 MG tablet Take 1 tablet by mouth daily 90 tablet 1    lisinopril (PRINIVIL;ZESTRIL) 5 MG tablet Take 1 tablet by mouth daily 90 tablet 3    pantoprazole (PROTONIX) 40 MG tablet Take 1 tablet by mouth daily 90 tablet 3    ondansetron

## 2023-09-11 RX ORDER — FUROSEMIDE 20 MG/1
20 TABLET ORAL DAILY
Qty: 90 TABLET | Refills: 0 | Status: SHIPPED | OUTPATIENT
Start: 2023-09-11

## 2023-10-03 ENCOUNTER — COMMUNITY OUTREACH (OUTPATIENT)
Dept: FAMILY MEDICINE CLINIC | Age: 68
End: 2023-10-03

## 2023-10-19 ENCOUNTER — OFFICE VISIT (OUTPATIENT)
Dept: FAMILY MEDICINE CLINIC | Age: 68
End: 2023-10-19
Payer: MEDICARE

## 2023-10-19 VITALS
BODY MASS INDEX: 44.86 KG/M2 | SYSTOLIC BLOOD PRESSURE: 126 MMHG | DIASTOLIC BLOOD PRESSURE: 80 MMHG | WEIGHT: 269.6 LBS | RESPIRATION RATE: 16 BRPM | HEART RATE: 72 BPM

## 2023-10-19 DIAGNOSIS — I10 HYPERTENSION, UNSPECIFIED TYPE: ICD-10-CM

## 2023-10-19 DIAGNOSIS — R05.3 PERSISTENT COUGH: Primary | ICD-10-CM

## 2023-10-19 DIAGNOSIS — M53.3 SACROILIAC PAIN: ICD-10-CM

## 2023-10-19 PROCEDURE — 1036F TOBACCO NON-USER: CPT | Performed by: FAMILY MEDICINE

## 2023-10-19 PROCEDURE — G8417 CALC BMI ABV UP PARAM F/U: HCPCS | Performed by: FAMILY MEDICINE

## 2023-10-19 PROCEDURE — 1123F ACP DISCUSS/DSCN MKR DOCD: CPT | Performed by: FAMILY MEDICINE

## 2023-10-19 PROCEDURE — 99213 OFFICE O/P EST LOW 20 MIN: CPT | Performed by: FAMILY MEDICINE

## 2023-10-19 PROCEDURE — G8427 DOCREV CUR MEDS BY ELIG CLIN: HCPCS | Performed by: FAMILY MEDICINE

## 2023-10-19 PROCEDURE — 3017F COLORECTAL CA SCREEN DOC REV: CPT | Performed by: FAMILY MEDICINE

## 2023-10-19 PROCEDURE — G8484 FLU IMMUNIZE NO ADMIN: HCPCS | Performed by: FAMILY MEDICINE

## 2023-10-19 PROCEDURE — G8399 PT W/DXA RESULTS DOCUMENT: HCPCS | Performed by: FAMILY MEDICINE

## 2023-10-19 PROCEDURE — 3074F SYST BP LT 130 MM HG: CPT | Performed by: FAMILY MEDICINE

## 2023-10-19 PROCEDURE — 1090F PRES/ABSN URINE INCON ASSESS: CPT | Performed by: FAMILY MEDICINE

## 2023-10-19 PROCEDURE — 96372 THER/PROPH/DIAG INJ SC/IM: CPT | Performed by: FAMILY MEDICINE

## 2023-10-19 PROCEDURE — 3079F DIAST BP 80-89 MM HG: CPT | Performed by: FAMILY MEDICINE

## 2023-10-19 RX ORDER — OLMESARTAN MEDOXOMIL 20 MG/1
20 TABLET ORAL DAILY
Qty: 90 TABLET | Refills: 3 | Status: SHIPPED | OUTPATIENT
Start: 2023-10-19

## 2023-10-19 RX ORDER — METHYLPREDNISOLONE ACETATE 40 MG/ML
40 INJECTION, SUSPENSION INTRA-ARTICULAR; INTRALESIONAL; INTRAMUSCULAR; SOFT TISSUE ONCE
Status: COMPLETED | OUTPATIENT
Start: 2023-10-19 | End: 2023-10-19

## 2023-10-19 RX ORDER — MELOXICAM 15 MG/1
15 TABLET ORAL DAILY
COMMUNITY

## 2023-10-19 RX ORDER — METHYLPREDNISOLONE ACETATE 80 MG/ML
80 INJECTION, SUSPENSION INTRA-ARTICULAR; INTRALESIONAL; INTRAMUSCULAR; SOFT TISSUE ONCE
Status: COMPLETED | OUTPATIENT
Start: 2023-10-19 | End: 2023-10-19

## 2023-10-19 RX ADMIN — METHYLPREDNISOLONE ACETATE 40 MG: 40 INJECTION, SUSPENSION INTRA-ARTICULAR; INTRALESIONAL; INTRAMUSCULAR; SOFT TISSUE at 13:02

## 2023-10-19 RX ADMIN — METHYLPREDNISOLONE ACETATE 80 MG: 80 INJECTION, SUSPENSION INTRA-ARTICULAR; INTRALESIONAL; INTRAMUSCULAR; SOFT TISSUE at 13:03

## 2023-10-19 ASSESSMENT — PATIENT HEALTH QUESTIONNAIRE - PHQ9
SUM OF ALL RESPONSES TO PHQ QUESTIONS 1-9: 0
SUM OF ALL RESPONSES TO PHQ9 QUESTIONS 1 & 2: 0
1. LITTLE INTEREST OR PLEASURE IN DOING THINGS: 0
SUM OF ALL RESPONSES TO PHQ QUESTIONS 1-9: 0
SUM OF ALL RESPONSES TO PHQ QUESTIONS 1-9: 0
2. FEELING DOWN, DEPRESSED OR HOPELESS: 0
SUM OF ALL RESPONSES TO PHQ QUESTIONS 1-9: 0

## 2023-10-19 ASSESSMENT — ENCOUNTER SYMPTOMS
WHEEZING: 0
SHORTNESS OF BREATH: 0
VOMITING: 1
BACK PAIN: 1
CHEST TIGHTNESS: 0
COUGH: 1

## 2023-10-19 NOTE — PROGRESS NOTES
Administrations This Visit       methylPREDNISolone acetate (DEPO-MEDROL) injection 40 mg       Admin Date  10/19/2023 Action  Given Dose  40 mg Route  IntraMUSCular Administered By  Kennedi Vasquez LPN              methylPREDNISolone acetate (DEPO-MEDROL) injection 80 mg       Admin Date  10/19/2023 Action  Given Dose  80 mg Route  IntraMUSCular Administered By  Kennedi Vasquez LPN                    Patient was given Depo Medrol 120mg 2ml IM in right dorosogluteal per v.o. Dr Chencho Johnson. Patient tolerated well and without incident.
UACS vs LPR vs ACEI vs othere  -  Change lisinopril to Benicar  -  Continue allergy medication  -  Discussed CXR, PFT, CT sinus, etc.  Wishes to hold off for now  -  Depo 120 mg IM given for her back    Return if symptoms worsen or fail to improve. An electronic signature was used to authenticate this note.     --Gaviota Biggs, DO

## 2023-10-23 ENCOUNTER — TELEPHONE (OUTPATIENT)
Dept: FAMILY MEDICINE CLINIC | Age: 68
End: 2023-10-23

## 2023-10-23 RX ORDER — TIZANIDINE 4 MG/1
4 TABLET ORAL EVERY 8 HOURS PRN
Qty: 15 TABLET | Refills: 0 | Status: SHIPPED | OUTPATIENT
Start: 2023-10-23

## 2023-10-23 NOTE — TELEPHONE ENCOUNTER
----- Message from Mario Farrell sent at 10/23/2023  9:16 AM EDT -----  Subject: Message to Provider    QUESTIONS  Information for Provider? pt was just seen for back pain w/ pcp and was   given a shot last week for it, but the shot is not helping. She is asking   for muscle relaxers to be sent over to her pharmacy.   ---------------------------------------------------------------------------  --------------  [address]  [phone]; Do not leave any message, patient will call back for answer,OK   to respond with electronic message via [employer] portal (only for patients   who have registered [employer] account)  ---------------------------------------------------------------------------  --------------  SCRIPT ANSWERS  Relationship to Patient?  Self

## 2023-10-26 RX ORDER — TIZANIDINE 4 MG/1
4 TABLET ORAL EVERY 8 HOURS PRN
Qty: 15 TABLET | Refills: 0 | OUTPATIENT
Start: 2023-10-26

## 2023-11-24 RX ORDER — ATORVASTATIN CALCIUM 80 MG/1
80 TABLET, FILM COATED ORAL DAILY
Qty: 90 TABLET | Refills: 3 | Status: SHIPPED | OUTPATIENT
Start: 2023-11-24

## 2023-11-30 RX ORDER — TIZANIDINE 4 MG/1
4 TABLET ORAL EVERY 8 HOURS PRN
Qty: 15 TABLET | Refills: 0 | Status: SHIPPED | OUTPATIENT
Start: 2023-11-30

## 2023-11-30 NOTE — TELEPHONE ENCOUNTER
----- Message from Wilma Deleon sent at 11/30/2023  1:38 PM EST -----  Subject: Refill Request    QUESTIONS  Name of Medication? tiZANidine (ZANAFLEX) 4 MG tablet  Patient-reported dosage and instructions? Take 1 tablet by mouth every 8   hours as needed (muscle spasms)  How many days do you have left? 1  [facility]? [id number]  Pharmacy phone number (if available)? [phone]  ---------------------------------------------------------------------------  --------------  CALL BACK INFO  What is the best way for the office to contact you? Do not leave any   message, patient will call back for answer  Preferred Call Back Phone Number? [phone]  ---------------------------------------------------------------------------  --------------  SCRIPT ANSWERS  Relationship to Patient?  Self

## 2023-12-04 RX ORDER — TIZANIDINE 4 MG/1
4 TABLET ORAL EVERY 8 HOURS PRN
Qty: 15 TABLET | Refills: 0 | Status: SHIPPED | OUTPATIENT
Start: 2023-12-04 | End: 2023-12-06

## 2023-12-06 RX ORDER — TIZANIDINE 4 MG/1
4 TABLET ORAL EVERY 8 HOURS PRN
Qty: 15 TABLET | Refills: 0 | Status: SHIPPED | OUTPATIENT
Start: 2023-12-06

## 2023-12-07 DIAGNOSIS — B00.1 COLD SORE: ICD-10-CM

## 2023-12-07 DIAGNOSIS — R11.0 NAUSEA: ICD-10-CM

## 2023-12-07 RX ORDER — ONDANSETRON 4 MG/1
TABLET, ORALLY DISINTEGRATING ORAL
Qty: 30 TABLET | Refills: 0 | Status: SHIPPED | OUTPATIENT
Start: 2023-12-07

## 2023-12-07 RX ORDER — VALACYCLOVIR HYDROCHLORIDE 1 G/1
TABLET, FILM COATED ORAL
Qty: 20 TABLET | Refills: 2 | Status: SHIPPED | OUTPATIENT
Start: 2023-12-07

## 2024-01-09 RX ORDER — TIZANIDINE 4 MG/1
4 TABLET ORAL EVERY 8 HOURS PRN
Qty: 15 TABLET | Refills: 0 | Status: SHIPPED | OUTPATIENT
Start: 2024-01-09

## 2024-01-09 NOTE — TELEPHONE ENCOUNTER
Patient called and would like a refill on the tizanidine.   She will check with her pharmacy around 3:00 today

## 2024-01-22 RX ORDER — TIZANIDINE 4 MG/1
4 TABLET ORAL EVERY 8 HOURS PRN
Qty: 15 TABLET | Refills: 0 | OUTPATIENT
Start: 2024-01-22

## 2024-01-22 RX ORDER — TIZANIDINE 4 MG/1
TABLET ORAL
Qty: 15 TABLET | Refills: 0 | Status: SHIPPED | OUTPATIENT
Start: 2024-01-22

## 2024-01-23 ENCOUNTER — TELEPHONE (OUTPATIENT)
Dept: FAMILY MEDICINE CLINIC | Age: 69
End: 2024-01-23

## 2024-01-23 DIAGNOSIS — R32 URINARY INCONTINENCE IN FEMALE: Primary | ICD-10-CM

## 2024-01-23 NOTE — TELEPHONE ENCOUNTER
Patient called and stated that she would like a referral to [facility] urolog. She stated that she was seen there in 2018 but they are needing a new referral. She stated that she is having trouble with incontinence     Patient understands [facility] urology will reach out to her for scheduling. She verbalized understanding.

## 2024-01-25 DIAGNOSIS — R11.0 NAUSEA: ICD-10-CM

## 2024-01-25 RX ORDER — ONDANSETRON 4 MG/1
TABLET, ORALLY DISINTEGRATING ORAL
Qty: 30 TABLET | Refills: 0 | Status: SHIPPED | OUTPATIENT
Start: 2024-01-25

## 2024-01-29 ENCOUNTER — TELEPHONE (OUTPATIENT)
Dept: FAMILY MEDICINE CLINIC | Age: 69
End: 2024-01-29

## 2024-01-29 NOTE — TELEPHONE ENCOUNTER
Patient calling due to had colon polyp removed with Dr. Rubio on Saturday.  She was told to follow-up with her PCP to see when to restart her ASA.  Please advise

## 2024-01-31 ENCOUNTER — TELEMEDICINE (OUTPATIENT)
Dept: FAMILY MEDICINE CLINIC | Age: 69
End: 2024-01-31
Payer: MEDICARE

## 2024-01-31 ENCOUNTER — TELEPHONE (OUTPATIENT)
Dept: FAMILY MEDICINE CLINIC | Age: 69
End: 2024-01-31

## 2024-01-31 DIAGNOSIS — J02.9 SORE THROAT: Primary | ICD-10-CM

## 2024-01-31 DIAGNOSIS — R09.82 PND (POST-NASAL DRIP): ICD-10-CM

## 2024-01-31 PROCEDURE — 99213 OFFICE O/P EST LOW 20 MIN: CPT | Performed by: FAMILY MEDICINE

## 2024-01-31 PROCEDURE — G8428 CUR MEDS NOT DOCUMENT: HCPCS | Performed by: FAMILY MEDICINE

## 2024-01-31 PROCEDURE — 3017F COLORECTAL CA SCREEN DOC REV: CPT | Performed by: FAMILY MEDICINE

## 2024-01-31 PROCEDURE — G8399 PT W/DXA RESULTS DOCUMENT: HCPCS | Performed by: FAMILY MEDICINE

## 2024-01-31 PROCEDURE — 1123F ACP DISCUSS/DSCN MKR DOCD: CPT | Performed by: FAMILY MEDICINE

## 2024-01-31 PROCEDURE — 1090F PRES/ABSN URINE INCON ASSESS: CPT | Performed by: FAMILY MEDICINE

## 2024-01-31 ASSESSMENT — ENCOUNTER SYMPTOMS
TROUBLE SWALLOWING: 0
RESPIRATORY NEGATIVE: 1
VOICE CHANGE: 0
RHINORRHEA: 1
GASTROINTESTINAL NEGATIVE: 1
SORE THROAT: 1

## 2024-01-31 NOTE — PROGRESS NOTES
Chica Samuel (:  1955) is a Established patient, here for evaluation of the following:    Subjective   HPI:    Chief Complaint   Patient presents with    Pharyngitis     Pt presents with c/o ST off and on for the last 2 weeks.  Denies fevers.      A lot of PND.    Denies sinus congestion, pain or pressure.    Denies GERD.    Patient Active Problem List   Diagnosis    CAD (coronary artery disease) 2014- mid lad 50 % patent LM< LCX and RCA    Hyperlipidemia LDL goal <70    Hypothyroid    History of total thyroidectomy    GERD (gastroesophageal reflux disease)    PUD (peptic ulcer disease)    IBS (irritable bowel syndrome)    IFG (impaired fasting glucose)    Vitamin D deficiency    SOB (shortness of breath) on exertion    Chest pain    Heart palpitations- intermittent    Abnormal nuclear stress test- anteroior and anteroseptal ischemia    Chest pain, atypical    Abdominal pain    Stress incontinence (female) (male)    Abnormal cardiovascular stress test    GOLDEN (dyspnea on exertion)    Arthritis of left hip    Degeneration of lumbosacral intervertebral disc    Pain, unspecified    Trochanteric bursitis of right hip    Unspecified infectious disease    Unilateral primary osteoarthritis, right hip     Past Surgical History:   Procedure Laterality Date    CARDIAC CATHETERIZATION      CARPAL TUNNEL RELEASE      CHOLECYSTECTOMY      COLONOSCOPY      HAMMER TOE SURGERY Left     HEEL SPUR SURGERY      HIP SURGERY  2022    HYSTERECTOMY (CERVIX STATUS UNKNOWN)      age 31    KNEE SURGERY  2009    OVARY REMOVAL Bilateral     age 31    TOTAL HIP ARTHROPLASTY Right 2022    TOTAL THYROIDECTOMY      UPPER GASTROINTESTINAL ENDOSCOPY  2009     Social History     Tobacco Use    Smoking status: Never    Smokeless tobacco: Never   Vaping Use    Vaping Use: Never used   Substance Use Topics    Alcohol use: Not Currently    Drug use: No       Review of Systems   Constitutional: Negative.  Negative for fever.   HENT:

## 2024-01-31 NOTE — TELEPHONE ENCOUNTER
Pt called office c/o a sore throat x2wks and bilateral ear pain off/on. Pt also has nasal congestion. She has tried OTC allergy med with no relief. No fever and no body aches. Please advise.

## 2024-02-01 RX ORDER — TIZANIDINE 4 MG/1
TABLET ORAL
Qty: 15 TABLET | Refills: 0 | Status: SHIPPED | OUTPATIENT
Start: 2024-02-01

## 2024-02-02 RX ORDER — TIZANIDINE 4 MG/1
TABLET ORAL
Qty: 15 TABLET | Refills: 0 | OUTPATIENT
Start: 2024-02-02

## 2024-02-07 RX ORDER — FUROSEMIDE 20 MG/1
20 TABLET ORAL DAILY
Qty: 90 TABLET | Refills: 0 | Status: SHIPPED | OUTPATIENT
Start: 2024-02-07

## 2024-02-08 ENCOUNTER — OFFICE VISIT (OUTPATIENT)
Dept: FAMILY MEDICINE CLINIC | Age: 69
End: 2024-02-08
Payer: MEDICARE

## 2024-02-08 VITALS
BODY MASS INDEX: 46.59 KG/M2 | SYSTOLIC BLOOD PRESSURE: 120 MMHG | RESPIRATION RATE: 16 BRPM | WEIGHT: 280 LBS | HEART RATE: 68 BPM | DIASTOLIC BLOOD PRESSURE: 70 MMHG

## 2024-02-08 DIAGNOSIS — E03.9 ACQUIRED HYPOTHYROIDISM: ICD-10-CM

## 2024-02-08 DIAGNOSIS — K21.9 GASTROESOPHAGEAL REFLUX DISEASE, UNSPECIFIED WHETHER ESOPHAGITIS PRESENT: ICD-10-CM

## 2024-02-08 DIAGNOSIS — J02.9 SORE THROAT: Primary | ICD-10-CM

## 2024-02-08 DIAGNOSIS — E78.00 PURE HYPERCHOLESTEROLEMIA: ICD-10-CM

## 2024-02-08 DIAGNOSIS — M19.012 PRIMARY OSTEOARTHRITIS OF LEFT SHOULDER: ICD-10-CM

## 2024-02-08 DIAGNOSIS — I25.10 CORONARY ARTERY DISEASE INVOLVING NATIVE CORONARY ARTERY OF NATIVE HEART WITHOUT ANGINA PECTORIS: ICD-10-CM

## 2024-02-08 DIAGNOSIS — R73.01 IFG (IMPAIRED FASTING GLUCOSE): ICD-10-CM

## 2024-02-08 DIAGNOSIS — R09.82 PND (POST-NASAL DRIP): ICD-10-CM

## 2024-02-08 LAB
ABSOLUTE BASO #: 0.05 K/UL (ref 0–0.2)
ABSOLUTE EOS #: 0.3 K/UL (ref 0–0.5)
ABSOLUTE LYMPH #: 1.97 K/UL (ref 1–4)
ABSOLUTE MONO #: 0.51 K/UL (ref 0.2–1)
ABSOLUTE NEUT #: 3.7 K/UL (ref 1.5–7.5)
ALBUMIN SERPL-MCNC: 4.2 G/DL (ref 3.5–5.2)
ALK PHOSPHATASE: 99 U/L (ref 40–142)
ALT SERPL-CCNC: 17 U/L (ref 5–40)
ANION GAP SERPL CALCULATED.3IONS-SCNC: 7 MEQ/L (ref 7–16)
AST SERPL-CCNC: 22 U/L (ref 9–40)
BASOPHILS RELATIVE PERCENT: 0.8 %
BILIRUB SERPL-MCNC: 0.3 MG/DL
BUN BLDV-MCNC: 17 MG/DL (ref 8–23)
CALCIUM SERPL-MCNC: 9.3 MG/DL (ref 8.5–10.5)
CHLORIDE BLD-SCNC: 106 MEQ/L (ref 95–107)
CHOLESTEROL/HDL RATIO: 3 RATIO
CHOLESTEROL: 188 MG/DL
CO2: 28 MEQ/L (ref 19–31)
CREAT SERPL-MCNC: 0.74 MG/DL (ref 0.6–1.3)
EGFR IF NONAFRICAN AMERICAN: 87 ML/MIN/1.73
EOSINOPHILS RELATIVE PERCENT: 4.6 %
GLUCOSE: 95 MG/DL (ref 70–99)
HCT VFR BLD CALC: 39.6 % (ref 34–45)
HDLC SERPL-MCNC: 62 MG/DL
HEMOGLOBIN: 13.3 G/DL (ref 11.5–15.5)
LDL CHOLESTEROL CALCULATED: 108 MG/DL
LDL/HDL RATIO: 1.7 RATIO
LYMPHOCYTE %: 30.1 %
MAGNESIUM: 2 MG/DL (ref 1.6–2.6)
MCH RBC QN AUTO: 29.8 PG (ref 25–33)
MCHC RBC AUTO-ENTMCNC: 33.6 G/DL (ref 31–36)
MCV RBC AUTO: 88.8 FL (ref 80–99)
MONOCYTES # BLD: 7.8 %
NEUTROPHILS RELATIVE PERCENT: 56.4 %
PDW BLD-RTO: 12.4 % (ref 11.5–15)
PLATELETS: 350 K/UL (ref 130–400)
PMV BLD AUTO: 9.4 FL (ref 9.3–13)
POTASSIUM SERPL-SCNC: 4.3 MEQ/L (ref 3.5–5.4)
RBC: 4.46 M/UL (ref 3.8–5.4)
SODIUM BLD-SCNC: 141 MEQ/L (ref 133–146)
T4 FREE: 1.22 NG/DL (ref 0.8–1.9)
TOTAL PROTEIN: 6.5 G/DL (ref 6.1–8.3)
TRIGL SERPL-MCNC: 90 MG/DL
TSH SERPL DL<=0.05 MIU/L-ACNC: 2.64 UIU/ML (ref 0.4–4.1)
VLDLC SERPL CALC-MCNC: 18 MG/DL
WBC: 6.6 K/UL (ref 3.5–11)

## 2024-02-08 PROCEDURE — G8427 DOCREV CUR MEDS BY ELIG CLIN: HCPCS | Performed by: NURSE PRACTITIONER

## 2024-02-08 PROCEDURE — G8417 CALC BMI ABV UP PARAM F/U: HCPCS | Performed by: NURSE PRACTITIONER

## 2024-02-08 PROCEDURE — 1090F PRES/ABSN URINE INCON ASSESS: CPT | Performed by: NURSE PRACTITIONER

## 2024-02-08 PROCEDURE — G8399 PT W/DXA RESULTS DOCUMENT: HCPCS | Performed by: NURSE PRACTITIONER

## 2024-02-08 PROCEDURE — 20610 DRAIN/INJ JOINT/BURSA W/O US: CPT | Performed by: NURSE PRACTITIONER

## 2024-02-08 PROCEDURE — 1123F ACP DISCUSS/DSCN MKR DOCD: CPT | Performed by: NURSE PRACTITIONER

## 2024-02-08 PROCEDURE — G8484 FLU IMMUNIZE NO ADMIN: HCPCS | Performed by: NURSE PRACTITIONER

## 2024-02-08 PROCEDURE — 99214 OFFICE O/P EST MOD 30 MIN: CPT | Performed by: NURSE PRACTITIONER

## 2024-02-08 PROCEDURE — 1036F TOBACCO NON-USER: CPT | Performed by: NURSE PRACTITIONER

## 2024-02-08 PROCEDURE — 3017F COLORECTAL CA SCREEN DOC REV: CPT | Performed by: NURSE PRACTITIONER

## 2024-02-08 RX ORDER — AZITHROMYCIN 250 MG/1
TABLET, FILM COATED ORAL
Qty: 6 TABLET | Refills: 0 | Status: SHIPPED | OUTPATIENT
Start: 2024-02-08 | End: 2024-02-18

## 2024-02-08 RX ORDER — METHYLPREDNISOLONE ACETATE 80 MG/ML
80 INJECTION, SUSPENSION INTRA-ARTICULAR; INTRALESIONAL; INTRAMUSCULAR; SOFT TISSUE ONCE
Status: COMPLETED | OUTPATIENT
Start: 2024-02-08 | End: 2024-02-08

## 2024-02-08 RX ADMIN — METHYLPREDNISOLONE ACETATE 80 MG: 80 INJECTION, SUSPENSION INTRA-ARTICULAR; INTRALESIONAL; INTRAMUSCULAR; SOFT TISSUE at 13:33

## 2024-02-08 SDOH — ECONOMIC STABILITY: FOOD INSECURITY: WITHIN THE PAST 12 MONTHS, THE FOOD YOU BOUGHT JUST DIDN'T LAST AND YOU DIDN'T HAVE MONEY TO GET MORE.: NEVER TRUE

## 2024-02-08 SDOH — ECONOMIC STABILITY: INCOME INSECURITY: HOW HARD IS IT FOR YOU TO PAY FOR THE VERY BASICS LIKE FOOD, HOUSING, MEDICAL CARE, AND HEATING?: NOT HARD AT ALL

## 2024-02-08 SDOH — ECONOMIC STABILITY: FOOD INSECURITY: WITHIN THE PAST 12 MONTHS, YOU WORRIED THAT YOUR FOOD WOULD RUN OUT BEFORE YOU GOT MONEY TO BUY MORE.: NEVER TRUE

## 2024-02-08 ASSESSMENT — PATIENT HEALTH QUESTIONNAIRE - PHQ9
SUM OF ALL RESPONSES TO PHQ QUESTIONS 1-9: 0
SUM OF ALL RESPONSES TO PHQ9 QUESTIONS 1 & 2: 0
1. LITTLE INTEREST OR PLEASURE IN DOING THINGS: 0
SUM OF ALL RESPONSES TO PHQ QUESTIONS 1-9: 0
2. FEELING DOWN, DEPRESSED OR HOPELESS: 0

## 2024-02-08 ASSESSMENT — ENCOUNTER SYMPTOMS
RHINORRHEA: 1
SORE THROAT: 1
ABDOMINAL PAIN: 0
NAUSEA: 0
SHORTNESS OF BREATH: 0
COUGH: 0

## 2024-02-08 NOTE — PROGRESS NOTES
JOINT/BURSA      5. Coronary artery disease involving native coronary artery of native heart without angina pectoris  Magnesium      6. IFG (impaired fasting glucose)  Hemoglobin A1C      7. Pure hypercholesterolemia  CBC with Auto Differential    Comprehensive Metabolic Panel    Lipid Panel      8. Acquired hypothyroidism  TSH    T4, Free            MDM:  XR reviewed  JOINT INJ Shoulder  ZPAk for ST  [facility]  Chronic conditions stable  RTO PRN    After written consent obtained, the left shoulder(s) bursa was prepped in sterile fashion, anesthetized with ethyl chloride and injected with 1cc of DepoMedrol 80 mg/cc and 3cc of lidocaine without difficulty.  Ice was then applied for 10 minutes and the patient released with follow up instructions.    An electronic signature was used to authenticate this note.    --Junior Rangel, APRN - CNP

## 2024-02-09 LAB
ESTIMATED AVERAGE GLUCOSE: 105 MG/DL
HBA1C MFR BLD: 5.3 % (ref 4.2–5.6)

## 2024-02-12 RX ORDER — TIZANIDINE 4 MG/1
TABLET ORAL
Qty: 15 TABLET | Refills: 0 | Status: SHIPPED | OUTPATIENT
Start: 2024-02-12

## 2024-02-14 RX ORDER — TIZANIDINE 4 MG/1
TABLET ORAL
Qty: 15 TABLET | Refills: 0 | OUTPATIENT
Start: 2024-02-14

## 2024-02-28 ENCOUNTER — OFFICE VISIT (OUTPATIENT)
Dept: CARDIOLOGY CLINIC | Age: 69
End: 2024-02-28
Payer: MEDICARE

## 2024-02-28 VITALS
BODY MASS INDEX: 46.2 KG/M2 | SYSTOLIC BLOOD PRESSURE: 156 MMHG | WEIGHT: 277.6 LBS | DIASTOLIC BLOOD PRESSURE: 82 MMHG | HEART RATE: 72 BPM

## 2024-02-28 DIAGNOSIS — I20.89 ANGINA OF EFFORT: Primary | ICD-10-CM

## 2024-02-28 DIAGNOSIS — R06.09 DOE (DYSPNEA ON EXERTION): ICD-10-CM

## 2024-02-28 DIAGNOSIS — I25.10 CAD IN NATIVE ARTERY: ICD-10-CM

## 2024-02-28 PROCEDURE — G8399 PT W/DXA RESULTS DOCUMENT: HCPCS | Performed by: INTERNAL MEDICINE

## 2024-02-28 PROCEDURE — G8484 FLU IMMUNIZE NO ADMIN: HCPCS | Performed by: INTERNAL MEDICINE

## 2024-02-28 PROCEDURE — 1036F TOBACCO NON-USER: CPT | Performed by: INTERNAL MEDICINE

## 2024-02-28 PROCEDURE — G8427 DOCREV CUR MEDS BY ELIG CLIN: HCPCS | Performed by: INTERNAL MEDICINE

## 2024-02-28 PROCEDURE — 1123F ACP DISCUSS/DSCN MKR DOCD: CPT | Performed by: INTERNAL MEDICINE

## 2024-02-28 PROCEDURE — G8417 CALC BMI ABV UP PARAM F/U: HCPCS | Performed by: INTERNAL MEDICINE

## 2024-02-28 PROCEDURE — 99214 OFFICE O/P EST MOD 30 MIN: CPT | Performed by: INTERNAL MEDICINE

## 2024-02-28 PROCEDURE — 1090F PRES/ABSN URINE INCON ASSESS: CPT | Performed by: INTERNAL MEDICINE

## 2024-02-28 PROCEDURE — 3017F COLORECTAL CA SCREEN DOC REV: CPT | Performed by: INTERNAL MEDICINE

## 2024-02-28 RX ORDER — METOPROLOL TARTRATE 50 MG/1
50 TABLET, FILM COATED ORAL 2 TIMES DAILY
Qty: 60 TABLET | Refills: 4 | Status: SHIPPED | OUTPATIENT
Start: 2024-02-28 | End: 2024-02-29

## 2024-02-28 RX ORDER — FUROSEMIDE 20 MG/1
20 TABLET ORAL DAILY
Qty: 90 TABLET | Refills: 3 | Status: SHIPPED | OUTPATIENT
Start: 2024-02-28

## 2024-02-28 RX ORDER — FAMOTIDINE 20 MG/1
20 TABLET, FILM COATED ORAL DAILY
COMMUNITY

## 2024-02-28 NOTE — PROGRESS NOTES
1 year follow up.   Denies chest pain or palpitations.  Reports more fatigue and SOB.   EKG last completed 05/2023  Med list up to date and pharmacy verified.   
presentation, stress test is indicated to investigate for possible underlying ischemic heart disease. Patient  agrees with that work up and its risks and benefits and potential need for additional testing if stress test is abnormal such as cardiac catheterization   /82  Increase metoprolol   Benicar   The patient was instructed to check the blood pressure at home, and record the readings. Patient will call office with blood pressure readings, will adjust patient's antihypertensive medications as needed accordingly   ASA  Lipitor   Hyperlipidemia: on statins, followed periodically. Patient need periodic lipid and liver profile  The patient is advised to begin progressive daily aerobic exercise program and attempt to lose weight.  On lasix  Limit Na intale  No signs of volume overload     Above findings and plan of care were discussed with patient in details, patient's questions were answered.     Disposition:  RTC in 12 months             Electronically signed by Tyler Whalen MD, FACC, [facility]    2/27/2024 at 5:09 PM EST

## 2024-02-29 RX ORDER — METOPROLOL TARTRATE 50 MG/1
50 TABLET, FILM COATED ORAL 2 TIMES DAILY
Qty: 180 TABLET | Refills: 3 | Status: SHIPPED | OUTPATIENT
Start: 2024-02-29

## 2024-03-07 ENCOUNTER — HOSPITAL ENCOUNTER (OUTPATIENT)
Dept: INTERVENTIONAL RADIOLOGY/VASCULAR | Age: 69
Discharge: HOME | End: 2024-03-09

## 2024-03-07 DIAGNOSIS — Z13.6 ENCOUNTER FOR SCREENING FOR VASCULAR DISEASE: ICD-10-CM

## 2024-03-07 PROCEDURE — 9900000021 US VASCULAR SCREENING

## 2024-03-12 ENCOUNTER — HOSPITAL ENCOUNTER (OUTPATIENT)
Dept: NUCLEAR MEDICINE | Age: 69
Discharge: HOME | End: 2024-03-12
Attending: INTERNAL MEDICINE
Payer: MEDICARE

## 2024-03-12 ENCOUNTER — HOSPITAL ENCOUNTER (OUTPATIENT)
Age: 69
Discharge: HOME | End: 2024-03-14
Attending: INTERNAL MEDICINE
Payer: MEDICARE

## 2024-03-12 ENCOUNTER — TELEPHONE (OUTPATIENT)
Dept: CARDIOLOGY CLINIC | Age: 69
End: 2024-03-12

## 2024-03-12 VITALS
BODY MASS INDEX: 46.15 KG/M2 | WEIGHT: 277 LBS | DIASTOLIC BLOOD PRESSURE: 82 MMHG | SYSTOLIC BLOOD PRESSURE: 156 MMHG | HEIGHT: 65 IN

## 2024-03-12 DIAGNOSIS — I20.89 ANGINA OF EFFORT: ICD-10-CM

## 2024-03-12 DIAGNOSIS — R06.09 DOE (DYSPNEA ON EXERTION): ICD-10-CM

## 2024-03-12 DIAGNOSIS — R94.39 ABNORMAL STRESS ECG: Primary | ICD-10-CM

## 2024-03-12 DIAGNOSIS — I25.10 CAD IN NATIVE ARTERY: ICD-10-CM

## 2024-03-12 LAB
ECHO AO ASC DIAM: 4 CM
ECHO AO ASCENDING AORTA INDEX: 1.76 CM/M2
ECHO AV CUSP MM: 2.2 CM
ECHO AV PEAK GRADIENT: 5 MMHG
ECHO AV PEAK VELOCITY: 1.1 M/S
ECHO AV VELOCITY RATIO: 0.82
ECHO BSA: 2.4 M2
ECHO BSA: 2.4 M2
ECHO IVC PROX: 1.4 CM
ECHO LA AREA 2C: 20.2 CM2
ECHO LA AREA 4C: 19.2 CM2
ECHO LA DIAMETER INDEX: 1.5 CM/M2
ECHO LA DIAMETER: 3.4 CM
ECHO LA MAJOR AXIS: 5.2 CM
ECHO LA MINOR AXIS: 5.2 CM
ECHO LA VOL BP: 60 ML (ref 22–52)
ECHO LA VOL MOD A2C: 65 ML (ref 22–52)
ECHO LA VOL MOD A4C: 57 ML (ref 22–52)
ECHO LA VOL/BSA BIPLANE: 26 ML/M2 (ref 16–34)
ECHO LA VOLUME INDEX MOD A2C: 29 ML/M2 (ref 16–34)
ECHO LA VOLUME INDEX MOD A4C: 25 ML/M2 (ref 16–34)
ECHO LV E' LATERAL VELOCITY: 8 CM/S
ECHO LV E' SEPTAL VELOCITY: 8 CM/S
ECHO LV FRACTIONAL SHORTENING: 27 % (ref 28–44)
ECHO LV INTERNAL DIMENSION DIASTOLE INDEX: 1.98 CM/M2
ECHO LV INTERNAL DIMENSION DIASTOLIC: 4.5 CM (ref 3.9–5.3)
ECHO LV INTERNAL DIMENSION SYSTOLIC INDEX: 1.45 CM/M2
ECHO LV INTERNAL DIMENSION SYSTOLIC: 3.3 CM
ECHO LV ISOVOLUMETRIC RELAXATION TIME (IVRT): 81 MS
ECHO LV IVSD: 1.3 CM (ref 0.6–0.9)
ECHO LV MASS 2D: 210.2 G (ref 67–162)
ECHO LV MASS INDEX 2D: 92.6 G/M2 (ref 43–95)
ECHO LV POSTERIOR WALL DIASTOLIC: 1.2 CM (ref 0.6–0.9)
ECHO LV RELATIVE WALL THICKNESS RATIO: 0.53
ECHO LVOT PEAK GRADIENT: 3 MMHG
ECHO LVOT PEAK VELOCITY: 0.9 M/S
ECHO MV A VELOCITY: 0.78 M/S
ECHO MV E DECELERATION TIME (DT): 246 MS
ECHO MV E VELOCITY: 0.71 M/S
ECHO MV E/A RATIO: 0.91
ECHO MV E/E' LATERAL: 8.88
ECHO MV E/E' RATIO (AVERAGED): 8.88
ECHO MV REGURGITANT PEAK GRADIENT: 67 MMHG
ECHO MV REGURGITANT PEAK VELOCITY: 4.1 M/S
ECHO PV MAX VELOCITY: 0.5 M/S
ECHO PV PEAK GRADIENT: 1 MMHG
ECHO RV INTERNAL DIMENSION: 3.3 CM
ECHO RV TAPSE: 2.1 CM (ref 1.7–?)
ECHO TV E WAVE: 0.7 M/S
ECHO TV REGURGITANT MAX VELOCITY: 1.94 M/S
ECHO TV REGURGITANT PEAK GRADIENT: 15 MMHG
NUC STRESS EJECTION FRACTION: 62 %
STRESS BASELINE DIAS BP: 73 MMHG
STRESS BASELINE HR: 57 BPM
STRESS BASELINE SYS BP: 127 MMHG
STRESS STAGE 1 DURATION: 1 MIN:SEC
STRESS STAGE 1 HR: 72 BPM
STRESS STAGE 2 BP: NORMAL MMHG
STRESS STAGE 2 DURATION: 1 MIN:SEC
STRESS STAGE 2 HR: 82 BPM
STRESS STAGE 3 BP: NORMAL MMHG
STRESS STAGE 3 DURATION: 1 MIN:SEC
STRESS STAGE 3 HR: 79 BPM
STRESS STAGE RECOVERY 1 BP: NORMAL MMHG
STRESS STAGE RECOVERY 1 DURATION: 1 MIN:SEC
STRESS STAGE RECOVERY 1 HR: 75 BPM
STRESS STAGE RECOVERY 2 DURATION: 1 MIN:SEC
STRESS STAGE RECOVERY 2 HR: 73 BPM
STRESS STAGE RECOVERY 3 BP: NORMAL MMHG
STRESS STAGE RECOVERY 3 DURATION: 1 MIN:SEC
STRESS STAGE RECOVERY 3 HR: 74 BPM
STRESS STAGE RECOVERY 4 BP: NORMAL MMHG
STRESS STAGE RECOVERY 4 DURATION: 1 MIN:SEC
STRESS STAGE RECOVERY 4 HR: 74 BPM
STRESS TARGET HR: 150 BPM
TID: 1.21

## 2024-03-12 PROCEDURE — 3430000000 HC RX DIAGNOSTIC RADIOPHARMACEUTICAL: Performed by: INTERNAL MEDICINE

## 2024-03-12 PROCEDURE — 93017 CV STRESS TEST TRACING ONLY: CPT

## 2024-03-12 PROCEDURE — 78452 HT MUSCLE IMAGE SPECT MULT: CPT

## 2024-03-12 PROCEDURE — 6360000002 HC RX W HCPCS: Performed by: INTERNAL MEDICINE

## 2024-03-12 PROCEDURE — 93016 CV STRESS TEST SUPVJ ONLY: CPT | Performed by: INTERNAL MEDICINE

## 2024-03-12 PROCEDURE — 93018 CV STRESS TEST I&R ONLY: CPT | Performed by: INTERNAL MEDICINE

## 2024-03-12 PROCEDURE — 78452 HT MUSCLE IMAGE SPECT MULT: CPT | Performed by: INTERNAL MEDICINE

## 2024-03-12 PROCEDURE — A9500 TC99M SESTAMIBI: HCPCS | Performed by: INTERNAL MEDICINE

## 2024-03-12 PROCEDURE — 93306 TTE W/DOPPLER COMPLETE: CPT

## 2024-03-12 RX ORDER — SODIUM CHLORIDE 0.9 % (FLUSH) 0.9 %
5-40 SYRINGE (ML) INJECTION PRN
Status: CANCELLED | OUTPATIENT
Start: 2024-03-12

## 2024-03-12 RX ORDER — TETRAKIS(2-METHOXYISOBUTYLISOCYANIDE)COPPER(I) TETRAFLUOROBORATE 1 MG/ML
9.7 INJECTION, POWDER, LYOPHILIZED, FOR SOLUTION INTRAVENOUS
Status: COMPLETED | OUTPATIENT
Start: 2024-03-12 | End: 2024-03-12

## 2024-03-12 RX ORDER — SODIUM CHLORIDE 9 MG/ML
INJECTION, SOLUTION INTRAVENOUS PRN
Status: CANCELLED | OUTPATIENT
Start: 2024-03-12

## 2024-03-12 RX ORDER — REGADENOSON 0.08 MG/ML
0.4 INJECTION, SOLUTION INTRAVENOUS
Status: COMPLETED | OUTPATIENT
Start: 2024-03-12 | End: 2024-03-12

## 2024-03-12 RX ORDER — TETRAKIS(2-METHOXYISOBUTYLISOCYANIDE)COPPER(I) TETRAFLUOROBORATE 1 MG/ML
33.6 INJECTION, POWDER, LYOPHILIZED, FOR SOLUTION INTRAVENOUS
Status: COMPLETED | OUTPATIENT
Start: 2024-03-12 | End: 2024-03-12

## 2024-03-12 RX ORDER — SODIUM CHLORIDE 0.9 % (FLUSH) 0.9 %
5-40 SYRINGE (ML) INJECTION EVERY 12 HOURS SCHEDULED
Status: CANCELLED | OUTPATIENT
Start: 2024-03-12

## 2024-03-12 RX ORDER — ACETAMINOPHEN 325 MG/1
650 TABLET ORAL EVERY 4 HOURS PRN
Status: CANCELLED | OUTPATIENT
Start: 2024-03-12

## 2024-03-12 RX ADMIN — REGADENOSON 0.4 MG: 0.08 INJECTION, SOLUTION INTRAVENOUS at 13:03

## 2024-03-12 RX ADMIN — Medication 33.6 MILLICURIE: at 12:45

## 2024-03-12 RX ADMIN — Medication 9.7 MILLICURIE: at 11:40

## 2024-03-12 NOTE — TELEPHONE ENCOUNTER
----- Message from Tyler Whalen MD sent at 3/12/2024  4:28 PM EDT -----  Changes noted on stress test are probably due to an artifact. However, ischemia \"blockages\" can not be ruled out. Inform patient. A LHC can be considered, only if patient reports continued/new cardiac complaints (ex. Chest pain, jaw pains, dyspnea on exertion).

## 2024-03-15 NOTE — TELEPHONE ENCOUNTER
Pt notified and she is having some issues, she is sob, and is so fatigued, would like to proceed with cath. Order is printed and given to scheduling.

## 2024-03-18 ENCOUNTER — TELEPHONE (OUTPATIENT)
Dept: CARDIOLOGY CLINIC | Age: 69
End: 2024-03-18

## 2024-03-18 ENCOUNTER — TELEPHONE (OUTPATIENT)
Dept: FAMILY MEDICINE CLINIC | Age: 69
End: 2024-03-18

## 2024-03-18 PROBLEM — R94.39 ABNORMAL STRESS TEST: Status: ACTIVE | Noted: 2024-03-12

## 2024-03-18 NOTE — TELEPHONE ENCOUNTER
Patient called and stated that she was scheduling testing and they told her she needed to stop the rivaroxaban before the testing she stated that she wasn't on the medication.     She stated that she wasn't even sure what it was. Informed patient it looks like it was prescribed back in 9/2022 after surgery. Informed I will double check to make sure she wasn't to continue it.

## 2024-03-18 NOTE — TELEPHONE ENCOUNTER
PROCEDURE: cardiac cath     DATE OF SERVICE: 04/01/2024    SERVICE LOCATION: [facility]    CPT CODE: 00692    PHYSICIAN: DR MCCLELLAN     DATE PRIOR AUTH SUBMITTED: 03/18/2024    STATUS: APPROVED.     CASE NUMBER: [id number]    AUTH NUMBER: [id number]     VALID:  04/01/2024-05/01/2024

## 2024-03-18 NOTE — TELEPHONE ENCOUNTER
Correct, appears given for DVT prophylaxis post-surgery.  She does not need to take this medication.

## 2024-03-29 DIAGNOSIS — R11.0 NAUSEA: ICD-10-CM

## 2024-03-29 RX ORDER — ONDANSETRON 4 MG/1
TABLET, ORALLY DISINTEGRATING ORAL
Qty: 30 TABLET | Refills: 0 | Status: SHIPPED | OUTPATIENT
Start: 2024-03-29

## 2024-03-29 RX ORDER — MECLIZINE HYDROCHLORIDE 25 MG/1
25 TABLET ORAL 3 TIMES DAILY PRN
Qty: 15 TABLET | Refills: 0 | Status: SHIPPED | OUTPATIENT
Start: 2024-03-29 | End: 2024-04-08

## 2024-03-29 NOTE — TELEPHONE ENCOUNTER
Patient requesting refill of Zofran ODT and Antivert to [facility].  Will check with pharmacy after noon.  Please refill if appropriate

## 2024-04-18 NOTE — PRE-PROCEDURE INSTRUCTIONS
Notified of Heart Cath procedure arrival time 0800 on Monday  [place] [facility] 2nd [facility]  NPO after midnight  Bring drivers license and insurance information  Wear comfortable clean clothes  Shower morning of and night before with liquid antibacterial soap  Remove jewelry   May have to stay overnight if have PTCA/stent - bring small overnight bag  Bring medications in original bottles - may take am meds with small amount of water.    Do not take any diabetic meds day of procedure.  Made aware of visitors limit to 2 at a time  Follow all instructions given by your physician  Please notify doctor office if you need to cancel or reschedule your procedure   needed at discharge  If you use CPap or BiPap for sleep apnea, please bring machine with you  Leave valuables at home

## 2024-04-19 ENCOUNTER — PREP FOR PROCEDURE (OUTPATIENT)
Dept: CARDIOLOGY | Age: 69
End: 2024-04-19

## 2024-04-19 RX ORDER — NITROGLYCERIN 0.4 MG/1
0.4 TABLET SUBLINGUAL EVERY 5 MIN PRN
Status: CANCELLED | OUTPATIENT
Start: 2024-04-19

## 2024-04-19 RX ORDER — DIPHENHYDRAMINE HYDROCHLORIDE 50 MG/ML
50 INJECTION INTRAMUSCULAR; INTRAVENOUS ONCE
Status: CANCELLED | OUTPATIENT
Start: 2024-04-19 | End: 2024-04-19

## 2024-04-19 RX ORDER — SODIUM CHLORIDE 0.9 % (FLUSH) 0.9 %
5-40 SYRINGE (ML) INJECTION EVERY 12 HOURS SCHEDULED
Status: CANCELLED | OUTPATIENT
Start: 2024-04-19

## 2024-04-19 RX ORDER — ASPIRIN 325 MG
325 TABLET ORAL ONCE
Status: CANCELLED | OUTPATIENT
Start: 2024-04-19 | End: 2024-04-19

## 2024-04-19 RX ORDER — SODIUM CHLORIDE 0.9 % (FLUSH) 0.9 %
5-40 SYRINGE (ML) INJECTION PRN
Status: CANCELLED | OUTPATIENT
Start: 2024-04-19

## 2024-04-19 RX ORDER — SODIUM CHLORIDE 9 MG/ML
INJECTION, SOLUTION INTRAVENOUS PRN
Status: CANCELLED | OUTPATIENT
Start: 2024-04-19

## 2024-04-20 DIAGNOSIS — K21.9 GASTROESOPHAGEAL REFLUX DISEASE, UNSPECIFIED WHETHER ESOPHAGITIS PRESENT: ICD-10-CM

## 2024-04-22 ENCOUNTER — HOSPITAL ENCOUNTER (OUTPATIENT)
Age: 69
Setting detail: OUTPATIENT SURGERY
Discharge: HOME | End: 2024-04-22
Attending: INTERNAL MEDICINE | Admitting: INTERNAL MEDICINE
Payer: MEDICARE

## 2024-04-22 VITALS
RESPIRATION RATE: 19 BRPM | DIASTOLIC BLOOD PRESSURE: 87 MMHG | BODY MASS INDEX: 47.2 KG/M2 | TEMPERATURE: 98.1 F | HEIGHT: 65 IN | WEIGHT: 283.29 LBS | SYSTOLIC BLOOD PRESSURE: 122 MMHG | HEART RATE: 65 BPM | OXYGEN SATURATION: 96 %

## 2024-04-22 DIAGNOSIS — R94.39 ABNORMAL STRESS TEST: ICD-10-CM

## 2024-04-22 LAB
ABO: NORMAL
ACTIVATED CLOTTING TIME: 271 SECONDS (ref 1–150)
ANION GAP SERPL CALC-SCNC: 7 MEQ/L (ref 8–16)
ANTIBODY SCREEN: NORMAL
APTT PPP: 33.5 SECONDS (ref 22–38)
BUN SERPL-MCNC: 32 MG/DL (ref 7–22)
CALCIUM SERPL-MCNC: 8.6 MG/DL (ref 8.5–10.5)
CHLORIDE SERPL-SCNC: 107 MEQ/L (ref 98–111)
CO2 SERPL-SCNC: 27 MEQ/L (ref 23–33)
CREAT SERPL-MCNC: 1.1 MG/DL (ref 0.4–1.2)
DEPRECATED RDW RBC AUTO: 47.1 FL (ref 35–45)
ECHO BSA: 2.43 M2
ERYTHROCYTE [DISTWIDTH] IN BLOOD BY AUTOMATED COUNT: 13.3 % (ref 11.5–14.5)
GFR SERPL CREATININE-BSD FRML MDRD: 54 ML/MIN/1.73M2
GLUCOSE SERPL-MCNC: 87 MG/DL (ref 70–108)
HCT VFR BLD AUTO: 41.1 % (ref 37–47)
HGB BLD-MCNC: 13 GM/DL (ref 12–16)
INR PPP: 0.87 (ref 0.85–1.13)
MCH RBC QN AUTO: 30 PG (ref 26–33)
MCHC RBC AUTO-ENTMCNC: 31.6 GM/DL (ref 32.2–35.5)
MCV RBC AUTO: 94.9 FL (ref 81–99)
PLATELET # BLD AUTO: 312 THOU/MM3 (ref 130–400)
PMV BLD AUTO: 9 FL (ref 9.4–12.4)
POTASSIUM SERPL-SCNC: 3.7 MEQ/L (ref 3.5–5.2)
RBC # BLD AUTO: 4.33 MILL/MM3 (ref 4.2–5.4)
RH FACTOR: NORMAL
SODIUM SERPL-SCNC: 141 MEQ/L (ref 135–145)
WBC # BLD AUTO: 7.6 THOU/MM3 (ref 4.8–10.8)

## 2024-04-22 PROCEDURE — 80048 BASIC METABOLIC PNL TOTAL CA: CPT

## 2024-04-22 PROCEDURE — 2500000003 HC RX 250 WO HCPCS: Performed by: INTERNAL MEDICINE

## 2024-04-22 PROCEDURE — 93005 ELECTROCARDIOGRAM TRACING: CPT | Performed by: STUDENT IN AN ORGANIZED HEALTH CARE EDUCATION/TRAINING PROGRAM

## 2024-04-22 PROCEDURE — 85610 PROTHROMBIN TIME: CPT

## 2024-04-22 PROCEDURE — 7100000010 HC PHASE II RECOVERY - FIRST 15 MIN: Performed by: INTERNAL MEDICINE

## 2024-04-22 PROCEDURE — 6370000000 HC RX 637 (ALT 250 FOR IP): Performed by: STUDENT IN AN ORGANIZED HEALTH CARE EDUCATION/TRAINING PROGRAM

## 2024-04-22 PROCEDURE — 86901 BLOOD TYPING SEROLOGIC RH(D): CPT

## 2024-04-22 PROCEDURE — 7100000011 HC PHASE II RECOVERY - ADDTL 15 MIN: Performed by: INTERNAL MEDICINE

## 2024-04-22 PROCEDURE — 93571 IV DOP VEL&/PRESS C FLO 1ST: CPT | Performed by: INTERNAL MEDICINE

## 2024-04-22 PROCEDURE — C1887 CATHETER, GUIDING: HCPCS | Performed by: INTERNAL MEDICINE

## 2024-04-22 PROCEDURE — 99152 MOD SED SAME PHYS/QHP 5/>YRS: CPT | Performed by: INTERNAL MEDICINE

## 2024-04-22 PROCEDURE — C1769 GUIDE WIRE: HCPCS | Performed by: INTERNAL MEDICINE

## 2024-04-22 PROCEDURE — C1894 INTRO/SHEATH, NON-LASER: HCPCS | Performed by: INTERNAL MEDICINE

## 2024-04-22 PROCEDURE — 6360000004 HC RX CONTRAST MEDICATION: Performed by: INTERNAL MEDICINE

## 2024-04-22 PROCEDURE — 93010 ELECTROCARDIOGRAM REPORT: CPT | Performed by: NUCLEAR MEDICINE

## 2024-04-22 PROCEDURE — 85347 COAGULATION TIME ACTIVATED: CPT

## 2024-04-22 PROCEDURE — 99153 MOD SED SAME PHYS/QHP EA: CPT | Performed by: INTERNAL MEDICINE

## 2024-04-22 PROCEDURE — 2580000003 HC RX 258: Performed by: STUDENT IN AN ORGANIZED HEALTH CARE EDUCATION/TRAINING PROGRAM

## 2024-04-22 PROCEDURE — 2709999900 HC NON-CHARGEABLE SUPPLY: Performed by: INTERNAL MEDICINE

## 2024-04-22 PROCEDURE — 93458 L HRT ARTERY/VENTRICLE ANGIO: CPT | Performed by: INTERNAL MEDICINE

## 2024-04-22 PROCEDURE — 86900 BLOOD TYPING SEROLOGIC ABO: CPT

## 2024-04-22 PROCEDURE — 85730 THROMBOPLASTIN TIME PARTIAL: CPT

## 2024-04-22 PROCEDURE — 86850 RBC ANTIBODY SCREEN: CPT

## 2024-04-22 PROCEDURE — 85027 COMPLETE CBC AUTOMATED: CPT

## 2024-04-22 PROCEDURE — 6360000002 HC RX W HCPCS: Performed by: INTERNAL MEDICINE

## 2024-04-22 PROCEDURE — 36415 COLL VENOUS BLD VENIPUNCTURE: CPT

## 2024-04-22 RX ORDER — ASPIRIN 325 MG
325 TABLET ORAL ONCE
Status: COMPLETED | OUTPATIENT
Start: 2024-04-22 | End: 2024-04-22

## 2024-04-22 RX ORDER — HEPARIN SODIUM 1000 [USP'U]/ML
INJECTION, SOLUTION INTRAVENOUS; SUBCUTANEOUS PRN
Status: DISCONTINUED | OUTPATIENT
Start: 2024-04-22 | End: 2024-04-22 | Stop reason: HOSPADM

## 2024-04-22 RX ORDER — SODIUM CHLORIDE 0.9 % (FLUSH) 0.9 %
5-40 SYRINGE (ML) INJECTION EVERY 12 HOURS SCHEDULED
Status: DISCONTINUED | OUTPATIENT
Start: 2024-04-22 | End: 2024-04-22 | Stop reason: HOSPADM

## 2024-04-22 RX ORDER — FENTANYL CITRATE 50 UG/ML
INJECTION, SOLUTION INTRAMUSCULAR; INTRAVENOUS PRN
Status: DISCONTINUED | OUTPATIENT
Start: 2024-04-22 | End: 2024-04-22 | Stop reason: HOSPADM

## 2024-04-22 RX ORDER — SODIUM CHLORIDE 9 MG/ML
INJECTION, SOLUTION INTRAVENOUS CONTINUOUS
Status: DISCONTINUED | OUTPATIENT
Start: 2024-04-22 | End: 2024-04-22 | Stop reason: HOSPADM

## 2024-04-22 RX ORDER — SODIUM CHLORIDE 9 MG/ML
INJECTION, SOLUTION INTRAVENOUS PRN
Status: DISCONTINUED | OUTPATIENT
Start: 2024-04-22 | End: 2024-04-22 | Stop reason: HOSPADM

## 2024-04-22 RX ORDER — SODIUM CHLORIDE 0.9 % (FLUSH) 0.9 %
5-40 SYRINGE (ML) INJECTION PRN
Status: DISCONTINUED | OUTPATIENT
Start: 2024-04-22 | End: 2024-04-22 | Stop reason: HOSPADM

## 2024-04-22 RX ORDER — DIPHENHYDRAMINE HYDROCHLORIDE 50 MG/ML
50 INJECTION INTRAMUSCULAR; INTRAVENOUS ONCE
Status: DISCONTINUED | OUTPATIENT
Start: 2024-04-22 | End: 2024-04-22 | Stop reason: HOSPADM

## 2024-04-22 RX ORDER — PANTOPRAZOLE SODIUM 40 MG/1
40 TABLET, DELAYED RELEASE ORAL DAILY
Qty: 90 TABLET | Refills: 3 | Status: SHIPPED | OUTPATIENT
Start: 2024-04-22

## 2024-04-22 RX ORDER — ADENOSINE 3 MG/ML
INJECTION, SOLUTION INTRAVENOUS CONTINUOUS PRN
Status: DISCONTINUED | OUTPATIENT
Start: 2024-04-22 | End: 2024-04-22 | Stop reason: HOSPADM

## 2024-04-22 RX ORDER — ACETAMINOPHEN 325 MG/1
650 TABLET ORAL EVERY 4 HOURS PRN
Status: DISCONTINUED | OUTPATIENT
Start: 2024-04-22 | End: 2024-04-22 | Stop reason: HOSPADM

## 2024-04-22 RX ORDER — MIDAZOLAM HYDROCHLORIDE 1 MG/ML
INJECTION INTRAMUSCULAR; INTRAVENOUS PRN
Status: DISCONTINUED | OUTPATIENT
Start: 2024-04-22 | End: 2024-04-22 | Stop reason: HOSPADM

## 2024-04-22 RX ORDER — SIMETHICONE 80 MG
80 TABLET,CHEWABLE ORAL 3 TIMES DAILY PRN
COMMUNITY

## 2024-04-22 RX ORDER — NITROGLYCERIN 0.4 MG/1
0.4 TABLET SUBLINGUAL EVERY 5 MIN PRN
Status: DISCONTINUED | OUTPATIENT
Start: 2024-04-22 | End: 2024-04-22 | Stop reason: HOSPADM

## 2024-04-22 RX ORDER — FUROSEMIDE 20 MG/1
20 TABLET ORAL DAILY
Qty: 90 TABLET | Refills: 3 | Status: SHIPPED | OUTPATIENT
Start: 2024-04-22

## 2024-04-22 RX ADMIN — SODIUM CHLORIDE: 9 INJECTION, SOLUTION INTRAVENOUS at 08:42

## 2024-04-22 RX ADMIN — ASPIRIN 325 MG: 325 TABLET ORAL at 08:43

## 2024-04-22 ASSESSMENT — PAIN SCALES - GENERAL: PAINLEVEL_OUTOF10: 0

## 2024-04-22 NOTE — H&P
[facility]  Sedation/Analgesia History & Physical    Pt Name: Chica Samuel  Account number: [id number]  MRN: [id number]  YOB: 1955  Provider Performing Procedure: Tyler Whalen MD MD  Referring Provider: Tyler Whalen MD   Primary Care Physician: Saad Villagran DO  Date: 4/22/2024    PRE-PROCEDURE    Code Status: FULL CODE  Brief History/Pre-Procedure Diagnosis:   Angina, abnormal stress test, CAD, preoperative cardiac risk assessment    Consent: : I have discussed with the patient risks, benefits, and alternatives (and relevant risks, benefits, and side effects related to alternatives or not receiving care), and likelihood of the success.   The patient and/or representative appear to understand and agree to proceed.  The discussion encompasses risks, benefits, and side effects related to the alternatives and the risks related to not receiving the proposed care, treatment, and services.     The indication, risks and benefits of the procedure and possible therapeutic consequences and alternatives were discussed with the patient. The patient was given the opportunity to ask questions and to have them answered to his/her satisfaction. Risks of the procedure include but are not limited to the following: Bleeding, hematoma including retroperitoneal hemmorhage, infection, pain and discomfort, injury to the aorta and other blood vessels, rhythm disturbance, low blood pressure, myocardial infarction, stroke, kidney damage/failure, myocardial perforation, allergic reactions to sedatives/contrast material, loss of pulse/vascular compromise requiring surgery, aneurysm/pseudoaneurysm formation, possible loss of a limb/hand/leg, needing blood transfusion, requiring emergent open heart surgery or emergent coronary intervention, the need for intubation/respiratory support, the requirement for defibrillation/cardioversion, and death. Alternatives to and omission of the suggested procedure

## 2024-04-22 NOTE — PROGRESS NOTES
0754 Patient admitted to 2E17  ambulatory with a cane for Heart Cath.  Patient NPO. Patient arrived by herself.  Vital signs obtained.   Assessment and data collection intiated.   Oriented to room.  Policies and procedures for 2E explained.   All questions answered with no further questions at this time.   Fall prevention and safety precautions discussed with patient.

## 2024-04-22 NOTE — DISCHARGE INSTRUCTIONS
Discharge Instructions for Radial Heart Catherization  1.  Take it easy for 3-4 days.  2.  No driving for 2 days.  3.  No lifting of 5 lbs or more for 5 days with the affected arm.  4.  May shower after 24 hours.  5.  Remove arm board after 24 hours.  6.  Apply a band aid to the insertion site daily for 5 days.  Wash site daily with soap and water.  7.  No creams, ointments, or powders near the insertion site.   8.  No tub baths, swimming, hot tubs, or hand washing dishes for 1 week.  9.  Watch for signs of infection (redness, warmth, swelling, or pus drainage) or coolness of extremity and call physician if this occurs  10.  If bleeding occurs from insertion site, apply pressure and call 911.   11. Watch for numbness/tingling- if this occurs go to the Emergency Room immediately.  12. Apply Ice for 15 minutes with an hour break in between and alternate with heat compress for 15 minutes to reduce swelling for 3-5 days.

## 2024-04-22 NOTE — FLOWSHEET NOTE
04/22/24 1442   AVS Reviewed   AVS & discharge instructions reviewed with patient and/or representative? Yes   Reviewed instructions with Patient;Other (name and relationship in comment)   Level of Understanding Questions answered;Teach back completed;Verbalized understanding;Return demonstration       Pt has no further questions at this time.  Belongings gathered.  Pt leaving for home with daughter.

## 2024-04-22 NOTE — PROGRESS NOTES
1045 Care taken over from cath lab. Radial site stable, no bleeding seen, site soft.  Armboard continued with vascband. Patient instructed not to bend wrist, not to put pressure on wrist and not to lift  or twist with wrist. Patient voices understanding.

## 2024-04-22 NOTE — PLAN OF CARE
Problem: Cardiovascular - Adult  Goal: Maintains optimal cardiac output and hemodynamic stability  Outcome: Progressing  Flowsheets (Taken 4/22/2024 0810)  Maintains optimal cardiac output and hemodynamic stability:   Monitor blood pressure and heart rate   Monitor urine output and notify Licensed Independent Practitioner for values outside of normal range     Problem: Discharge Planning  Goal: Discharge to home or other facility with appropriate resources  Outcome: Progressing  Flowsheets (Taken 4/22/2024 0810)  Discharge to home or other facility with appropriate resources:   Identify barriers to discharge with patient and caregiver   Arrange for needed discharge resources and transportation as appropriate     Problem: Pain  Goal: Verbalizes/displays adequate comfort level or baseline comfort level  Outcome: Progressing  Flowsheets  Taken 4/22/2024 0810  Verbalizes/displays adequate comfort level or baseline comfort level:   Encourage patient to monitor pain and request assistance   Assess pain using appropriate pain scale  Taken 4/22/2024 0800  Verbalizes/displays adequate comfort level or baseline comfort level: Encourage patient to monitor pain and request assistance     Problem: Safety - Adult  Goal: Free from fall injury  Outcome: Progressing   Care plan reviewed with patient.  Patient verbalizes understanding of the plan of care and contributes to goal setting.

## 2024-04-24 LAB
EKG ATRIAL RATE: 67 BPM
EKG P AXIS: 56 DEGREES
EKG P-R INTERVAL: 222 MS
EKG Q-T INTERVAL: 398 MS
EKG QRS DURATION: 96 MS
EKG QTC CALCULATION (BAZETT): 420 MS
EKG R AXIS: 3 DEGREES
EKG T AXIS: 71 DEGREES
EKG VENTRICULAR RATE: 67 BPM

## 2024-06-05 DIAGNOSIS — I10 HYPERTENSION, UNSPECIFIED TYPE: ICD-10-CM

## 2024-06-05 RX ORDER — OLMESARTAN MEDOXOMIL 20 MG/1
20 TABLET ORAL DAILY
Qty: 90 TABLET | Refills: 3 | Status: SHIPPED | OUTPATIENT
Start: 2024-06-05

## 2024-06-05 RX ORDER — METOPROLOL TARTRATE 50 MG/1
50 TABLET, FILM COATED ORAL 2 TIMES DAILY
Qty: 180 TABLET | Refills: 3 | Status: SHIPPED | OUTPATIENT
Start: 2024-06-05

## 2024-06-06 ENCOUNTER — TELEPHONE (OUTPATIENT)
Dept: FAMILY MEDICINE CLINIC | Age: 69
End: 2024-06-06

## 2024-06-06 RX ORDER — IBUPROFEN 600 MG/1
TABLET ORAL
Qty: 360 TABLET | Refills: 3 | Status: SHIPPED | OUTPATIENT
Start: 2024-06-06

## 2024-06-06 RX ORDER — SEMAGLUTIDE 0.25 MG/.5ML
0.25 INJECTION, SOLUTION SUBCUTANEOUS
Qty: 2 ML | Refills: 0 | Status: SHIPPED | OUTPATIENT
Start: 2024-06-06

## 2024-06-06 NOTE — TELEPHONE ENCOUNTER
Patient called stating we would be receiving an approval from insurance for Wegovy.  She spoke with her insurance and told them she needed this medication for weight loss.  Aware approval received today.  Patient questioning if needs appt or can just send Rx to pharmacy.  Please advise

## 2024-07-05 RX ORDER — SEMAGLUTIDE 0.5 MG/.5ML
0.5 INJECTION, SOLUTION SUBCUTANEOUS
Qty: 2 ML | Refills: 0 | Status: SHIPPED | OUTPATIENT
Start: 2024-07-05

## 2024-07-08 ENCOUNTER — TELEPHONE (OUTPATIENT)
Dept: FAMILY MEDICINE CLINIC | Age: 69
End: 2024-07-08

## 2024-07-08 NOTE — TELEPHONE ENCOUNTER
The patient called requesting a refill on her Wegovy.  She states that she has had no side effects and is asking if her dose should be increased.  She uses [facility].      If no call back the patient will check with her pharmacy after 4pm.

## 2024-07-19 ENCOUNTER — TELEPHONE (OUTPATIENT)
Dept: CARDIOLOGY CLINIC | Age: 69
End: 2024-07-19

## 2024-07-19 NOTE — TELEPHONE ENCOUNTER
[facility] faxed for records last office note and recent heart cath report .Last  office note and cath faxed

## 2024-07-21 DIAGNOSIS — R11.0 NAUSEA: ICD-10-CM

## 2024-07-22 RX ORDER — ONDANSETRON 4 MG/1
TABLET, ORALLY DISINTEGRATING ORAL
Qty: 30 TABLET | Refills: 0 | Status: SHIPPED | OUTPATIENT
Start: 2024-07-22

## 2024-07-23 ENCOUNTER — OFFICE VISIT (OUTPATIENT)
Dept: FAMILY MEDICINE CLINIC | Age: 69
End: 2024-07-23
Payer: MEDICARE

## 2024-07-23 VITALS
BODY MASS INDEX: 45.45 KG/M2 | SYSTOLIC BLOOD PRESSURE: 118 MMHG | HEIGHT: 65 IN | HEART RATE: 76 BPM | WEIGHT: 272.8 LBS | RESPIRATION RATE: 16 BRPM | DIASTOLIC BLOOD PRESSURE: 72 MMHG

## 2024-07-23 DIAGNOSIS — M25.512 TRIGGER POINT OF LEFT SHOULDER REGION: Primary | ICD-10-CM

## 2024-07-23 PROCEDURE — 1036F TOBACCO NON-USER: CPT | Performed by: NURSE PRACTITIONER

## 2024-07-23 PROCEDURE — G8427 DOCREV CUR MEDS BY ELIG CLIN: HCPCS | Performed by: NURSE PRACTITIONER

## 2024-07-23 PROCEDURE — G8417 CALC BMI ABV UP PARAM F/U: HCPCS | Performed by: NURSE PRACTITIONER

## 2024-07-23 PROCEDURE — 3017F COLORECTAL CA SCREEN DOC REV: CPT | Performed by: NURSE PRACTITIONER

## 2024-07-23 PROCEDURE — G8399 PT W/DXA RESULTS DOCUMENT: HCPCS | Performed by: NURSE PRACTITIONER

## 2024-07-23 PROCEDURE — 1124F ACP DISCUSS-NO DSCNMKR DOCD: CPT | Performed by: NURSE PRACTITIONER

## 2024-07-23 PROCEDURE — 1090F PRES/ABSN URINE INCON ASSESS: CPT | Performed by: NURSE PRACTITIONER

## 2024-07-23 PROCEDURE — 99213 OFFICE O/P EST LOW 20 MIN: CPT | Performed by: NURSE PRACTITIONER

## 2024-07-23 PROCEDURE — G2211 COMPLEX E/M VISIT ADD ON: HCPCS | Performed by: NURSE PRACTITIONER

## 2024-07-23 RX ORDER — BACLOFEN 10 MG/1
10-20 TABLET ORAL 3 TIMES DAILY PRN
Qty: 21 TABLET | Refills: 0 | Status: SHIPPED | OUTPATIENT
Start: 2024-07-23

## 2024-07-23 ASSESSMENT — ENCOUNTER SYMPTOMS
COUGH: 0
ABDOMINAL PAIN: 0
NAUSEA: 0
SHORTNESS OF BREATH: 0

## 2024-07-23 NOTE — PROGRESS NOTES
Chica WILSON [facility] (1955) 69 y.o. female here for evaluation of the following chief complaint(s):      HPI:  Chief Complaint   Patient presents with    Back Pain     X 3-4 days ago     Shoulder Pain       Onset of 3-4 days with upper back  and shoulder pain.  Pain in left shoulder blade.  Hurts 20 minutes into work.  Seen [employer] x 1 with rib adjustment.     Pt having left shoulder pain that is chronic in nature.     Having TKR next Monday 7/29/24.   Taken off NSAID    Vitals:    07/23/24 1244   BP: 118/72   Pulse: 76   Resp: 16       Patient Active Problem List   Diagnosis    CAD (coronary artery disease) 7/2014- mid lad 50 % patent LM< LCX and RCA    Hyperlipidemia LDL goal <70    Hypothyroid    History of total thyroidectomy    GERD (gastroesophageal reflux disease)    PUD (peptic ulcer disease)    IBS (irritable bowel syndrome)    IFG (impaired fasting glucose)    Vitamin D deficiency    SOB (shortness of breath) on exertion    Chest pain    Heart palpitations- intermittent    Abnormal nuclear stress test- anteroior and anteroseptal ischemia    Chest pain, atypical    Abdominal pain    Stress incontinence (female) (male)    Abnormal cardiovascular stress test    GOLDEN (dyspnea on exertion)    Arthritis of left hip    Degeneration of lumbosacral intervertebral disc    Pain, unspecified    Trochanteric bursitis of right hip    Unspecified infectious disease    Unilateral primary osteoarthritis, right hip    Abnormal stress test       SUBJECTIVE/OBJECTIVE:  Review of Systems   Constitutional:  Negative for chills and fever.   HENT: Negative.     Respiratory:  Negative for cough and shortness of breath.    Cardiovascular:  Negative for chest pain.   Gastrointestinal:  Negative for abdominal pain and nausea.   Musculoskeletal:  Positive for arthralgias and myalgias.   Skin:  Negative for rash.   Neurological:  Negative for dizziness, light-headedness and headaches.   Psychiatric/Behavioral: Negative.

## 2024-07-31 ENCOUNTER — TELEPHONE (OUTPATIENT)
Dept: FAMILY MEDICINE CLINIC | Age: 69
End: 2024-07-31

## 2024-07-31 DIAGNOSIS — M25.512 TRIGGER POINT OF LEFT SHOULDER REGION: Primary | ICD-10-CM

## 2024-07-31 RX ORDER — TRAMADOL HYDROCHLORIDE 50 MG/1
50 TABLET ORAL EVERY 8 HOURS PRN
Qty: 15 TABLET | Refills: 0 | Status: SHIPPED | OUTPATIENT
Start: 2024-07-31 | End: 2024-08-05

## 2024-07-31 NOTE — TELEPHONE ENCOUNTER
Patient called and stated that she was seen about a week ago for back pain. She stated that she was taking ibuprofen for the pain and she had to stop because she is scheduled for surgery on Monday 7/12/2024 for total knee replacement in [place].     She would like a prescription for medication to help with pain that will not interfere with surgery.     She will check with [facility] after 12:00 today

## 2024-08-16 RX ORDER — SEMAGLUTIDE 1 MG/.5ML
1 INJECTION, SOLUTION SUBCUTANEOUS
Qty: 2 ML | Refills: 0 | Status: SHIPPED | OUTPATIENT
Start: 2024-08-16

## 2024-08-22 PROBLEM — R56.9 NEW ONSET SEIZURE (HCC): Status: ACTIVE | Noted: 2024-08-05

## 2024-08-22 PROBLEM — M54.16 LUMBAR RADICULOPATHY: Status: ACTIVE | Noted: 2024-08-22

## 2024-08-29 DIAGNOSIS — R11.0 NAUSEA: ICD-10-CM

## 2024-08-30 RX ORDER — ONDANSETRON 4 MG/1
TABLET, ORALLY DISINTEGRATING ORAL
Qty: 30 TABLET | Refills: 0 | Status: SHIPPED | OUTPATIENT
Start: 2024-08-30

## 2024-09-03 ENCOUNTER — OFFICE VISIT (OUTPATIENT)
Dept: FAMILY MEDICINE CLINIC | Age: 69
End: 2024-09-03
Payer: MEDICARE

## 2024-09-03 VITALS
HEART RATE: 72 BPM | WEIGHT: 257.5 LBS | BODY MASS INDEX: 42.85 KG/M2 | RESPIRATION RATE: 16 BRPM | DIASTOLIC BLOOD PRESSURE: 60 MMHG | SYSTOLIC BLOOD PRESSURE: 102 MMHG

## 2024-09-03 DIAGNOSIS — I25.10 CORONARY ARTERY DISEASE INVOLVING NATIVE CORONARY ARTERY OF NATIVE HEART WITHOUT ANGINA PECTORIS: ICD-10-CM

## 2024-09-03 DIAGNOSIS — E78.00 PURE HYPERCHOLESTEROLEMIA: ICD-10-CM

## 2024-09-03 DIAGNOSIS — K21.9 GASTROESOPHAGEAL REFLUX DISEASE, UNSPECIFIED WHETHER ESOPHAGITIS PRESENT: ICD-10-CM

## 2024-09-03 DIAGNOSIS — R56.9 SEIZURE-LIKE ACTIVITY (HCC): Primary | ICD-10-CM

## 2024-09-03 DIAGNOSIS — R73.01 IFG (IMPAIRED FASTING GLUCOSE): ICD-10-CM

## 2024-09-03 DIAGNOSIS — I10 HYPERTENSION, UNSPECIFIED TYPE: ICD-10-CM

## 2024-09-03 DIAGNOSIS — M17.12 PRIMARY OSTEOARTHRITIS OF LEFT KNEE: ICD-10-CM

## 2024-09-03 PROCEDURE — 99214 OFFICE O/P EST MOD 30 MIN: CPT | Performed by: FAMILY MEDICINE

## 2024-09-03 PROCEDURE — 1036F TOBACCO NON-USER: CPT | Performed by: FAMILY MEDICINE

## 2024-09-03 PROCEDURE — G2211 COMPLEX E/M VISIT ADD ON: HCPCS | Performed by: FAMILY MEDICINE

## 2024-09-03 PROCEDURE — G8417 CALC BMI ABV UP PARAM F/U: HCPCS | Performed by: FAMILY MEDICINE

## 2024-09-03 PROCEDURE — G8427 DOCREV CUR MEDS BY ELIG CLIN: HCPCS | Performed by: FAMILY MEDICINE

## 2024-09-03 PROCEDURE — 1124F ACP DISCUSS-NO DSCNMKR DOCD: CPT | Performed by: FAMILY MEDICINE

## 2024-09-03 PROCEDURE — 3017F COLORECTAL CA SCREEN DOC REV: CPT | Performed by: FAMILY MEDICINE

## 2024-09-03 PROCEDURE — 1090F PRES/ABSN URINE INCON ASSESS: CPT | Performed by: FAMILY MEDICINE

## 2024-09-03 PROCEDURE — 3074F SYST BP LT 130 MM HG: CPT | Performed by: FAMILY MEDICINE

## 2024-09-03 PROCEDURE — G8399 PT W/DXA RESULTS DOCUMENT: HCPCS | Performed by: FAMILY MEDICINE

## 2024-09-03 PROCEDURE — 3078F DIAST BP <80 MM HG: CPT | Performed by: FAMILY MEDICINE

## 2024-09-03 RX ORDER — HYDROXYZINE HYDROCHLORIDE 25 MG/1
25 TABLET, FILM COATED ORAL NIGHTLY
Qty: 30 TABLET | Refills: 0 | Status: SHIPPED | OUTPATIENT
Start: 2024-09-03

## 2024-09-03 ASSESSMENT — ENCOUNTER SYMPTOMS
RESPIRATORY NEGATIVE: 1
GASTROINTESTINAL NEGATIVE: 1

## 2024-09-03 NOTE — PROGRESS NOTES
Referral faxed to [facility] Dr. Brown [phone], they will contact pt to schedule.  
TAKE 1 TABLET EVERY DAY 90 tablet 3    pantoprazole (PROTONIX) 40 MG tablet TAKE 1 TABLET EVERY DAY 90 tablet 3    simethicone (MYLICON) 80 MG chewable tablet Take 1 tablet by mouth 3 times daily as needed for Flatulence      valACYclovir (VALTREX) 1 g tablet TAKE 2 TABLETS BY MOUTH TWICE DAILY AT ONSET OF COLD SORES FOR 1 DAY 20 tablet 2    atorvastatin (LIPITOR) 80 MG tablet TAKE 1 TABLET EVERY DAY 90 tablet 3    Ascorbic Acid (VITAMIN C PO) Take 1,000 mg by mouth      aspirin 81 MG EC tablet Take 1 tablet by mouth daily      Cyanocobalamin (B-12 PO) Take by mouth daily      POTASSIUM PO Take by mouth      Magnesium 400 MG TABS 100 mg       Cholecalciferol (VITAMIN D PO) Take by mouth daily       famotidine (PEPCID) 40 MG tablet Take 1 tablet by mouth daily (before dinner)       Current Facility-Administered Medications   Medication Dose Route Frequency Provider Last Rate Last Admin    triamcinolone acetonide (KENALOG-40) injection 40 mg  40 mg Intra-artICUlar Once Saad Villagran DO           Past Surgical History:   Procedure Laterality Date    CARDIAC CATHETERIZATION  2008    CARDIAC PROCEDURE N/A 4/22/2024    Left heart cath / coronary angiography performed by Tyler Whalen MD at [facility] CARDIAC CATH LAB    CARDIAC PROCEDURE N/A 4/22/2024    Fractional flow reserve (FFR) performed by Tyler Whalen MD at [facility] CARDIAC CATH LAB    CARPAL TUNNEL RELEASE  2008    CHOLECYSTECTOMY      COLONOSCOPY      HAMMER TOE SURGERY Left     HEEL SPUR SURGERY      HIP SURGERY  08/2022    HYSTERECTOMY (CERVIX STATUS UNKNOWN)      age 31    KNEE SURGERY  2009    OVARY REMOVAL Bilateral     age 31    TOTAL HIP ARTHROPLASTY Right 08/2022    TOTAL THYROIDECTOMY      UPPER GASTROINTESTINAL ENDOSCOPY  2009       Review of Systems   Constitutional: Negative.    HENT: Negative.     Respiratory: Negative.     Cardiovascular: Negative.    Gastrointestinal: Negative.    Musculoskeletal: Negative.    All other systems reviewed and are

## 2024-09-04 ENCOUNTER — OFFICE VISIT (OUTPATIENT)
Dept: NEUROLOGY | Age: 69
End: 2024-09-04
Payer: MEDICARE

## 2024-09-04 VITALS
BODY MASS INDEX: 43.15 KG/M2 | HEIGHT: 65 IN | WEIGHT: 259 LBS | SYSTOLIC BLOOD PRESSURE: 105 MMHG | OXYGEN SATURATION: 97 % | DIASTOLIC BLOOD PRESSURE: 64 MMHG | HEART RATE: 72 BPM

## 2024-09-04 DIAGNOSIS — R56.9 SEIZURE-LIKE ACTIVITY (HCC): Primary | ICD-10-CM

## 2024-09-04 PROCEDURE — G8427 DOCREV CUR MEDS BY ELIG CLIN: HCPCS | Performed by: PSYCHIATRY & NEUROLOGY

## 2024-09-04 PROCEDURE — 99205 OFFICE O/P NEW HI 60 MIN: CPT | Performed by: PSYCHIATRY & NEUROLOGY

## 2024-09-04 PROCEDURE — G8399 PT W/DXA RESULTS DOCUMENT: HCPCS | Performed by: PSYCHIATRY & NEUROLOGY

## 2024-09-04 PROCEDURE — G8417 CALC BMI ABV UP PARAM F/U: HCPCS | Performed by: PSYCHIATRY & NEUROLOGY

## 2024-09-04 PROCEDURE — 1124F ACP DISCUSS-NO DSCNMKR DOCD: CPT | Performed by: PSYCHIATRY & NEUROLOGY

## 2024-09-04 PROCEDURE — 3017F COLORECTAL CA SCREEN DOC REV: CPT | Performed by: PSYCHIATRY & NEUROLOGY

## 2024-09-04 PROCEDURE — 1090F PRES/ABSN URINE INCON ASSESS: CPT | Performed by: PSYCHIATRY & NEUROLOGY

## 2024-09-04 PROCEDURE — 1036F TOBACCO NON-USER: CPT | Performed by: PSYCHIATRY & NEUROLOGY

## 2024-09-04 RX ORDER — FAMOTIDINE 40 MG/1
40 TABLET, FILM COATED ORAL
COMMUNITY

## 2024-09-09 ENCOUNTER — HOSPITAL ENCOUNTER (OUTPATIENT)
Dept: NEUROLOGY | Age: 69
Discharge: HOME | End: 2024-09-09
Payer: MEDICARE

## 2024-09-09 DIAGNOSIS — R56.9 SEIZURE-LIKE ACTIVITY (HCC): ICD-10-CM

## 2024-09-09 PROCEDURE — 95816 EEG AWAKE AND DROWSY: CPT

## 2024-09-09 PROCEDURE — 95816 EEG AWAKE AND DROWSY: CPT | Performed by: PSYCHIATRY & NEUROLOGY

## 2024-09-16 RX ORDER — ATORVASTATIN CALCIUM 80 MG/1
80 TABLET, FILM COATED ORAL DAILY
Qty: 90 TABLET | Refills: 3 | Status: SHIPPED | OUTPATIENT
Start: 2024-09-16

## 2024-09-16 RX ORDER — SEMAGLUTIDE 1 MG/.5ML
1 INJECTION, SOLUTION SUBCUTANEOUS
Qty: 2 ML | Refills: 2 | Status: SHIPPED | OUTPATIENT
Start: 2024-09-16

## 2024-09-19 RX ORDER — METOPROLOL TARTRATE 50 MG
50 TABLET ORAL 2 TIMES DAILY
Qty: 180 TABLET | Refills: 3 | Status: SHIPPED | OUTPATIENT
Start: 2024-09-19

## 2024-09-25 ENCOUNTER — OFFICE VISIT (OUTPATIENT)
Dept: FAMILY MEDICINE CLINIC | Age: 69
End: 2024-09-25

## 2024-09-25 VITALS
RESPIRATION RATE: 18 BRPM | OXYGEN SATURATION: 98 % | WEIGHT: 251.9 LBS | DIASTOLIC BLOOD PRESSURE: 78 MMHG | HEIGHT: 65 IN | SYSTOLIC BLOOD PRESSURE: 118 MMHG | HEART RATE: 75 BPM | BODY MASS INDEX: 41.97 KG/M2 | TEMPERATURE: 97.4 F

## 2024-09-25 DIAGNOSIS — R42 EPISODIC LIGHTHEADEDNESS: ICD-10-CM

## 2024-09-25 DIAGNOSIS — G47.00 INSOMNIA, UNSPECIFIED TYPE: ICD-10-CM

## 2024-09-25 DIAGNOSIS — I25.10 CORONARY ARTERY DISEASE INVOLVING NATIVE CORONARY ARTERY OF NATIVE HEART WITHOUT ANGINA PECTORIS: ICD-10-CM

## 2024-09-25 DIAGNOSIS — I10 HYPERTENSION, UNSPECIFIED TYPE: ICD-10-CM

## 2024-09-25 DIAGNOSIS — R53.83 FATIGUE, UNSPECIFIED TYPE: ICD-10-CM

## 2024-09-25 DIAGNOSIS — R40.0 DAYTIME SOMNOLENCE: Primary | ICD-10-CM

## 2024-09-25 RX ORDER — METOPROLOL TARTRATE 50 MG
25 TABLET ORAL 2 TIMES DAILY
Qty: 180 TABLET | Refills: 3
Start: 2024-09-25

## 2024-09-26 ASSESSMENT — ENCOUNTER SYMPTOMS
GASTROINTESTINAL NEGATIVE: 1
RESPIRATORY NEGATIVE: 1

## 2024-10-01 ENCOUNTER — OFFICE VISIT (OUTPATIENT)
Dept: CARDIOLOGY CLINIC | Age: 69
End: 2024-10-01
Payer: MEDICARE

## 2024-10-01 VITALS
WEIGHT: 252 LBS | BODY MASS INDEX: 41.99 KG/M2 | DIASTOLIC BLOOD PRESSURE: 67 MMHG | OXYGEN SATURATION: 96 % | HEIGHT: 65 IN | SYSTOLIC BLOOD PRESSURE: 119 MMHG | HEART RATE: 93 BPM

## 2024-10-01 DIAGNOSIS — I25.10 CAD IN NATIVE ARTERY: Primary | ICD-10-CM

## 2024-10-01 DIAGNOSIS — R00.2 PALPITATIONS: ICD-10-CM

## 2024-10-01 PROCEDURE — G8417 CALC BMI ABV UP PARAM F/U: HCPCS | Performed by: INTERNAL MEDICINE

## 2024-10-01 PROCEDURE — 1124F ACP DISCUSS-NO DSCNMKR DOCD: CPT | Performed by: INTERNAL MEDICINE

## 2024-10-01 PROCEDURE — 99214 OFFICE O/P EST MOD 30 MIN: CPT | Performed by: INTERNAL MEDICINE

## 2024-10-01 PROCEDURE — G8484 FLU IMMUNIZE NO ADMIN: HCPCS | Performed by: INTERNAL MEDICINE

## 2024-10-01 PROCEDURE — 3017F COLORECTAL CA SCREEN DOC REV: CPT | Performed by: INTERNAL MEDICINE

## 2024-10-01 PROCEDURE — 1036F TOBACCO NON-USER: CPT | Performed by: INTERNAL MEDICINE

## 2024-10-01 PROCEDURE — G8399 PT W/DXA RESULTS DOCUMENT: HCPCS | Performed by: INTERNAL MEDICINE

## 2024-10-01 PROCEDURE — G8427 DOCREV CUR MEDS BY ELIG CLIN: HCPCS | Performed by: INTERNAL MEDICINE

## 2024-10-01 PROCEDURE — 1090F PRES/ABSN URINE INCON ASSESS: CPT | Performed by: INTERNAL MEDICINE

## 2024-10-01 NOTE — PROGRESS NOTES
Follow up.    Last EKG done on 04/22/2024.  Patient had heart cath done on 04/22/2024.    Reports shortness of breath with exertion and extreme fatigue worsening over the last three months.    Also reporting chest tightness with exertion and lightheadedness.  Also mentions her feet are always cold.    Denies chest pain, palpitations, dizziness, and edema.

## 2024-10-01 NOTE — PROGRESS NOTES
[facility] [place] [facility]  [facility] CARDIOLOGY  [address]  [address]  [facility] [address]  Dept: [phone]  Dept Fax: [phone]  Loc: [phone]    Visit Date: 10/1/2024    Ms. Samuel is a 69 y.o. female  who presented for:  CHF, CAD  HPI:   HPI   Chica Samuel is a pleasant 69 year old female patient who  has a past medical history of Arthritis, CAD (coronary artery disease), Degeneration of lumbosacral intervertebral disc, GERD (gastroesophageal reflux disease), Hyperlipidemia LDL goal <70, Hypertension, Hypothyroidism, Irritable bowel syndrome, New onset seizure (HCC), Seizure (HCC), Stress incontinence (female) (male), and Ulcer. Her FH is significant for CAD, CHF, patient's brother  from [place]. The patient has h/o mild cardiomyopathy. Echocardiogram in  revealed an EF of 45-50%.LHC 2024 revealed an intermediate mid LAD stenosis, FFR 0.88. Echo 3/2024 revealed an EF of 60-65%, LVH, ascending aortic diameter of 4 cm. Reports palpitations.       Current Outpatient Medications:     Celecoxib (CELEBREX PO), Take by mouth, Disp: , Rfl:     metoprolol tartrate (LOPRESSOR) 50 MG tablet, Take 0.5 tablets by mouth 2 times daily, Disp: 180 tablet, Rfl: 3    atorvastatin (LIPITOR) 80 MG tablet, TAKE 1 TABLET EVERY DAY, Disp: 90 tablet, Rfl: 3    Semaglutide-Weight Management (WEGOVY) 1 MG/0.5ML SOAJ SC injection, Inject 1 mg into the skin every 7 days, Disp: 2 mL, Rfl: 2    famotidine (PEPCID) 40 MG tablet, Take 1 tablet by mouth daily (before dinner), Disp: , Rfl:     hydrOXYzine HCl (ATARAX) 25 MG tablet, Take 1 tablet by mouth at bedtime, Disp: 30 tablet, Rfl: 0    ondansetron (ZOFRAN-ODT) 4 MG disintegrating tablet, DISSOLVE 1 TABLET ON THE TONGUE EVERY 8 HOURS AS NEEDED FOR NAUSEA, Disp: 30 tablet, Rfl: 0     MG tablet, TAKE 1 TABLET EVERY 6 HOURS AS NEEDED FOR PAIN, Disp: 360 tablet, Rfl: 3    olmesartan (BENICAR) 20 MG tablet, Take 1 tablet by mouth daily, Disp: 90

## 2024-10-07 ENCOUNTER — COMMUNITY OUTREACH (OUTPATIENT)
Dept: FAMILY MEDICINE CLINIC | Age: 69
End: 2024-10-07

## 2024-10-10 ENCOUNTER — TELEPHONE (OUTPATIENT)
Dept: CARDIOLOGY CLINIC | Age: 69
End: 2024-10-10

## 2024-10-10 NOTE — TELEPHONE ENCOUNTER
Pt left msg to cancel/reschedule holter scheduled for this afternoon    Attempted to call pt, number no longer in service  Holter cancelled

## 2024-10-17 RX ORDER — HYDROXYZINE HYDROCHLORIDE 25 MG/1
25 TABLET, FILM COATED ORAL NIGHTLY
Qty: 30 TABLET | Refills: 0 | Status: SHIPPED | OUTPATIENT
Start: 2024-10-17

## 2024-10-23 ENCOUNTER — OFFICE VISIT (OUTPATIENT)
Dept: FAMILY MEDICINE CLINIC | Age: 69
End: 2024-10-23

## 2024-10-23 VITALS
SYSTOLIC BLOOD PRESSURE: 128 MMHG | HEART RATE: 68 BPM | WEIGHT: 260.7 LBS | BODY MASS INDEX: 43.38 KG/M2 | RESPIRATION RATE: 16 BRPM | DIASTOLIC BLOOD PRESSURE: 70 MMHG

## 2024-10-23 DIAGNOSIS — I10 HYPERTENSION, UNSPECIFIED TYPE: ICD-10-CM

## 2024-10-23 DIAGNOSIS — I25.10 CORONARY ARTERY DISEASE INVOLVING NATIVE CORONARY ARTERY OF NATIVE HEART WITHOUT ANGINA PECTORIS: Primary | ICD-10-CM

## 2024-10-23 DIAGNOSIS — R73.01 IFG (IMPAIRED FASTING GLUCOSE): ICD-10-CM

## 2024-10-23 DIAGNOSIS — E78.00 PURE HYPERCHOLESTEROLEMIA: ICD-10-CM

## 2024-10-23 RX ORDER — SEMAGLUTIDE 1.7 MG/.75ML
1.7 INJECTION, SOLUTION SUBCUTANEOUS
Qty: 3 ML | Refills: 2 | Status: SHIPPED | OUTPATIENT
Start: 2024-10-23

## 2024-10-23 ASSESSMENT — ENCOUNTER SYMPTOMS
RESPIRATORY NEGATIVE: 1
GASTROINTESTINAL NEGATIVE: 1

## 2024-10-23 NOTE — PROGRESS NOTES
Chica Samuel (:  1955) is a 69 y.o. female,Established patient, here for evaluation of the following chief complaint(s):  Follow-up (Discuss Wegovy, increased cost due to \"donut hole\")          Subjective   SUBJECTIVE/OBJECTIVE:  HPI  Chief Complaint   Patient presents with    Follow-up     Discuss Wegovy, increased cost due to \"donut hole\"     Pt presents to discuss her Wegovy.      The price jumped to $400 per month.      Weight is up since starting the medication, pt states that she gained all the weight back after she stopped it 2 weeks ago.  Wt Readings from Last 3 Encounters:   10/23/24 118.3 kg (260 lb 11.2 oz)   10/01/24 114.3 kg (252 lb)   24 114.3 kg (251 lb 14.4 oz)     Requesting higher dose.    BP Readings from Last 3 Encounters:   10/23/24 128/70   10/01/24 119/67   24 118/78       Patient Active Problem List   Diagnosis    CAD (coronary artery disease) 2014- mid lad 50 % patent LM< LCX and RCA    Hyperlipidemia LDL goal <70    Hypothyroid    History of total thyroidectomy    GERD (gastroesophageal reflux disease)    PUD (peptic ulcer disease)    IBS (irritable bowel syndrome)    IFG (impaired fasting glucose)    Vitamin D deficiency    SOB (shortness of breath) on exertion    Chest pain    Heart palpitations- intermittent    Abnormal nuclear stress test- anteroior and anteroseptal ischemia    Chest pain, atypical    Abdominal pain    Stress incontinence (female) (male)    Abnormal cardiovascular stress test    GOLDEN (dyspnea on exertion)    Arthritis of left hip    Degeneration of lumbosacral intervertebral disc    Pain, unspecified    Trochanteric bursitis of right hip    Unspecified infectious disease    Unilateral primary osteoarthritis, right hip    Abnormal stress test    New onset seizure (HCC)    Lumbar radiculopathy       Current Outpatient Medications   Medication Sig Dispense Refill    Semaglutide-Weight Management (WEGOVY) 1.7 MG/0.75ML SOAJ SC injection Inject 1.7 mg

## 2024-10-24 ENCOUNTER — TELEPHONE (OUTPATIENT)
Dept: FAMILY MEDICINE CLINIC | Age: 69
End: 2024-10-24

## 2024-10-24 NOTE — TELEPHONE ENCOUNTER
Patient called and stated that she was seen yesterday and she was fine. She stated that starting last night she wasn't feeling well and she woke up with sinus pressure, congestion, cough, fever, chills and body aches.     She wanted to know if there is any medication you could prescribe.

## 2024-10-24 NOTE — TELEPHONE ENCOUNTER
Viral likely, abx will not help.  Rest, fluids, OTC decongestant.  Schedule appt next week if still not seeing improvement.  Illness typically will last 7-10 days.

## 2024-10-26 ENCOUNTER — HOSPITAL ENCOUNTER (EMERGENCY)
Age: 69
Discharge: HOME | End: 2024-10-26
Payer: MEDICARE

## 2024-10-26 VITALS
TEMPERATURE: 98.6 F | OXYGEN SATURATION: 93 % | SYSTOLIC BLOOD PRESSURE: 137 MMHG | HEART RATE: 92 BPM | RESPIRATION RATE: 22 BRPM | DIASTOLIC BLOOD PRESSURE: 81 MMHG

## 2024-10-26 DIAGNOSIS — J20.9 ACUTE BRONCHITIS, UNSPECIFIED ORGANISM: Primary | ICD-10-CM

## 2024-10-26 LAB
EKG ATRIAL RATE: 80 BPM
EKG P AXIS: 62 DEGREES
EKG P-R INTERVAL: 184 MS
EKG Q-T INTERVAL: 402 MS
EKG QRS DURATION: 94 MS
EKG QTC CALCULATION (BAZETT): 463 MS
EKG R AXIS: -23 DEGREES
EKG T AXIS: 83 DEGREES
EKG VENTRICULAR RATE: 80 BPM
S PYO AG THROAT QL: NEGATIVE

## 2024-10-26 PROCEDURE — 99213 OFFICE O/P EST LOW 20 MIN: CPT | Performed by: NURSE PRACTITIONER

## 2024-10-26 PROCEDURE — 87651 STREP A DNA AMP PROBE: CPT

## 2024-10-26 PROCEDURE — 99213 OFFICE O/P EST LOW 20 MIN: CPT

## 2024-10-26 PROCEDURE — 93005 ELECTROCARDIOGRAM TRACING: CPT | Performed by: NURSE PRACTITIONER

## 2024-10-26 PROCEDURE — 93010 ELECTROCARDIOGRAM REPORT: CPT | Performed by: NUCLEAR MEDICINE

## 2024-10-26 RX ORDER — AZITHROMYCIN 250 MG/1
TABLET, FILM COATED ORAL
Qty: 6 TABLET | Refills: 0 | Status: SHIPPED | OUTPATIENT
Start: 2024-10-26 | End: 2024-11-05

## 2024-10-26 RX ORDER — BENZONATATE 200 MG/1
200 CAPSULE ORAL 3 TIMES DAILY PRN
Qty: 30 CAPSULE | Refills: 0 | Status: SHIPPED | OUTPATIENT
Start: 2024-10-26

## 2024-10-26 RX ORDER — PREDNISONE 20 MG/1
20 TABLET ORAL DAILY
Qty: 5 TABLET | Refills: 0 | Status: SHIPPED | OUTPATIENT
Start: 2024-10-26 | End: 2024-10-31

## 2024-10-26 ASSESSMENT — ENCOUNTER SYMPTOMS
SORE THROAT: 1
COUGH: 1
CONSTIPATION: 0
BLOOD IN STOOL: 0
VOMITING: 0
WHEEZING: 0
CHEST TIGHTNESS: 1
RHINORRHEA: 0
ABDOMINAL PAIN: 0
SINUS PRESSURE: 1
NAUSEA: 0
EYE PAIN: 0
SHORTNESS OF BREATH: 1
DIARRHEA: 0

## 2024-10-26 ASSESSMENT — PAIN SCALES - GENERAL: PAINLEVEL_OUTOF10: 5

## 2024-10-26 ASSESSMENT — PAIN - FUNCTIONAL ASSESSMENT: PAIN_FUNCTIONAL_ASSESSMENT: 0-10

## 2024-10-26 ASSESSMENT — PAIN DESCRIPTION - ORIENTATION: ORIENTATION: RIGHT

## 2024-10-26 ASSESSMENT — PAIN DESCRIPTION - LOCATION: LOCATION: EAR

## 2024-10-26 NOTE — ED TRIAGE NOTES
When being triaged in the room then she mentioned it felt like an elephant sitting on chest when walking/exercise, EKG done

## 2024-10-26 NOTE — ED PROVIDER NOTES
[facility] URGENT CARE  UrgentCare Encounter      CHIEFCOMPLAINT       Chief Complaint   Patient presents with    Cough        Nurses Notes reviewed and I agree except as noted in the HPI.  HISTORY OF PRESENT ILLNESS   Chica Samuel is a 69 y.o. female who presents to urgent care with complaint of nasal congestion, right ear pain, cough, headache, severely sore throat and possible fever, shortness of breath and \"feels like she has an elephant sitting on her chest when she walks\".  Symptoms have been ongoing for the last 3 days.  She states that she has taken Mucinex for her symptoms.  She denies other symptoms including actual chest pain and shortness of breath at rest.    REVIEW OF SYSTEMS     Review of Systems   Constitutional:  Negative for appetite change, chills, fatigue, fever and unexpected weight change.   HENT:  Positive for congestion, ear pain, sinus pressure and sore throat. Negative for rhinorrhea.    Eyes:  Negative for pain and visual disturbance.   Respiratory:  Positive for cough, chest tightness and shortness of breath. Negative for wheezing.    Cardiovascular:  Negative for chest pain, palpitations and leg swelling.   Gastrointestinal:  Negative for abdominal pain, blood in stool, constipation, diarrhea, nausea and vomiting.   Genitourinary:  Negative for dysuria, frequency and hematuria.   Musculoskeletal:  Negative for arthralgias, joint swelling and neck stiffness.   Skin:  Negative for rash.   Neurological:  Positive for headaches. Negative for dizziness, syncope, weakness and light-headedness.   Hematological:  Does not bruise/bleed easily.       PAST MEDICAL HISTORY         Diagnosis Date    Arthritis     CAD (coronary artery disease) 03/13/2014    Degeneration of lumbosacral intervertebral disc 10/19/2022    GERD (gastroesophageal reflux disease) 03/13/2014    Hyperlipidemia LDL goal <70 03/13/2014    Hypertension     Hypothyroidism     Irritable bowel syndrome     New onset    Labs:  Results for orders placed or performed during the hospital encounter of 10/26/24   Strep Screen Group A Throat   Result Value Ref Range    Rapid Strep A Screen NEGATIVE    EKG 12 Lead   Result Value Ref Range    Ventricular Rate 80 BPM    Atrial Rate 80 BPM    P-R Interval 184 ms    QRS Duration 94 ms    Q-T Interval 402 ms    QTc Calculation (Bazett) 463 ms    P Axis 62 degrees    R Axis -23 degrees    T Axis 83 degrees     EKG shows normal sinus rhythm at a rate of 80 with no ST abnormalities and no ectopy noted.    IMAGING:  No orders to display     URGENT CARE COURSE:        MDM      Patient presents to urgent care with complaint of nasal congestion, right ear pain, cough, headache, severely sore throat and possible fever, shortness of breath and \"feels like she has an elephant sitting on her chest when she walks\".  EKG was completed to rule out ischemic changes. Rapid strep was negative.  Will treat as a bronchitis with Azithromyacin, prednisone and tesslaon.  Patient follow-up with primary care provider.    Patient instructed go to ER for worsening symptoms, chest pain, inability to keep liquids down, inability to urinate for greater than 8 hours or difficulty breathing.  Keep ears dry and out of wind.  May use heating pad for 15 minutes at a time for comfort.  Follow-up with your primary care provider.    May take Tylenol or ibuprofen as needed for pain or fever.  Increase oral fluid intake.      Medications - No data to display  PROCEDURES:    Procedures    FINALIMPRESSION      1. Acute bronchitis, unspecified organism        DISPOSITION/PLAN   DISPOSITION Decision To Discharge 10/26/2024 04:50:14 PM         PATIENT REFERRED TO:  Saad Villagran, DO  [address] [facility], [address] [place] [address]  [phone]          DISCHARGE MEDICATIONS:  New Prescriptions    AZITHROMYCIN (ZITHROMAX) 250 MG TABLET    500mg on day 1 followed by 250mg on days 2 - 5    BENZONATATE (TESSALON) 200 MG CAPSULE

## 2024-10-26 NOTE — DISCHARGE INSTRUCTIONS
Go to ER for worsening symptoms, chest pain, inability to keep liquids down, inability to urinate for greater than 8 hours or difficulty breathing.    Keep ears dry and out of wind.  May use heating pad for 15 minutes at a time for comfort.  Follow-up with your primary care provider.    May take Tylenol or ibuprofen as needed for pain or fever.  Increase oral fluid intake.

## 2024-10-26 NOTE — ED TRIAGE NOTES
thought had a cold, right ear hurt bad last night, has a cold, sinus congestion, mucinex for the last 3 days not helping, more short of breath,, when being active feels like an elephant on chest

## 2024-11-07 ENCOUNTER — HOSPITAL ENCOUNTER (OUTPATIENT)
Age: 69
Discharge: HOME | End: 2024-11-09
Attending: INTERNAL MEDICINE
Payer: MEDICARE

## 2024-11-07 DIAGNOSIS — I25.10 CAD IN NATIVE ARTERY: ICD-10-CM

## 2024-11-07 DIAGNOSIS — R00.2 PALPITATIONS: ICD-10-CM

## 2024-11-07 PROCEDURE — 93225 XTRNL ECG REC<48 HRS REC: CPT

## 2024-11-17 RX ORDER — HYDROXYZINE HYDROCHLORIDE 25 MG/1
25 TABLET, FILM COATED ORAL NIGHTLY
Qty: 30 TABLET | Refills: 0 | Status: SHIPPED | OUTPATIENT
Start: 2024-11-17

## 2024-11-22 LAB
ACQUISITION DURATION: NORMAL S
AVERAGE HEART RATE: 80 BPM
HOOKUP DATE: NORMAL
HOOKUP TIME: NORMAL
MAX HEART RATE TIME/DATE: NORMAL
MAX HEART RATE: 134 BPM
MIN HEART RATE TIME/DATE: NORMAL
MIN HEART RATE: 68 BPM
NUMBER OF QRS COMPLEXES: NORMAL
NUMBER OF SUPRAVENTRICULAR COUPLETS: 0
NUMBER OF SUPRAVENTRICULAR ECTOPICS: 29
NUMBER OF SUPRAVENTRICULAR ISOLATED BEATS: 21
NUMBER OF VENTRICULAR BIGEMINAL CYCLES: 0
NUMBER OF VENTRICULAR COUPLETS: 0
NUMBER OF VENTRICULAR ECTOPICS: 7

## 2024-11-25 ENCOUNTER — HOSPITAL ENCOUNTER (EMERGENCY)
Age: 69
Discharge: HOME | End: 2024-11-25
Payer: MEDICARE

## 2024-11-25 VITALS
OXYGEN SATURATION: 98 % | RESPIRATION RATE: 16 BRPM | HEART RATE: 77 BPM | TEMPERATURE: 97.9 F | DIASTOLIC BLOOD PRESSURE: 69 MMHG | SYSTOLIC BLOOD PRESSURE: 141 MMHG

## 2024-11-25 DIAGNOSIS — G89.29 CHRONIC BILATERAL LOW BACK PAIN WITHOUT SCIATICA: Primary | ICD-10-CM

## 2024-11-25 DIAGNOSIS — M54.50 CHRONIC BILATERAL LOW BACK PAIN WITHOUT SCIATICA: Primary | ICD-10-CM

## 2024-11-25 PROCEDURE — 96372 THER/PROPH/DIAG INJ SC/IM: CPT

## 2024-11-25 PROCEDURE — 99213 OFFICE O/P EST LOW 20 MIN: CPT

## 2024-11-25 PROCEDURE — 6360000002 HC RX W HCPCS

## 2024-11-25 RX ORDER — METHYLPREDNISOLONE ACETATE 40 MG/ML
40 INJECTION, SUSPENSION INTRA-ARTICULAR; INTRALESIONAL; INTRAMUSCULAR; SOFT TISSUE ONCE
Status: COMPLETED | OUTPATIENT
Start: 2024-11-25 | End: 2024-11-25

## 2024-11-25 RX ORDER — ETODOLAC 200 MG/1
200 CAPSULE ORAL EVERY 8 HOURS
Qty: 18 CAPSULE | Refills: 0 | Status: SHIPPED | OUTPATIENT
Start: 2024-11-25 | End: 2024-12-01

## 2024-11-25 RX ORDER — CYCLOBENZAPRINE HCL 5 MG
5 TABLET ORAL 2 TIMES DAILY PRN
Qty: 10 TABLET | Refills: 0 | Status: SHIPPED | OUTPATIENT
Start: 2024-11-25 | End: 2024-11-30

## 2024-11-25 RX ORDER — PREDNISONE 20 MG/1
TABLET ORAL
Qty: 15 TABLET | Refills: 0 | Status: SHIPPED | OUTPATIENT
Start: 2024-11-25 | End: 2024-12-04

## 2024-11-25 RX ADMIN — METHYLPREDNISOLONE ACETATE 40 MG: 40 INJECTION, SUSPENSION INTRA-ARTICULAR; INTRALESIONAL; INTRAMUSCULAR; INTRASYNOVIAL; SOFT TISSUE at 17:58

## 2024-11-25 ASSESSMENT — ENCOUNTER SYMPTOMS
CHEST TIGHTNESS: 0
NAUSEA: 0
BACK PAIN: 1
VOMITING: 0
WHEEZING: 0
PHOTOPHOBIA: 0
COUGH: 0
DIARRHEA: 0
ABDOMINAL PAIN: 0
SHORTNESS OF BREATH: 0
EYE PAIN: 0
APNEA: 0
TROUBLE SWALLOWING: 0
RHINORRHEA: 0
CONSTIPATION: 0
SORE THROAT: 0
SINUS PAIN: 0

## 2024-11-25 NOTE — ED PROVIDER NOTES
[facility] URGENT CARE  Urgent Care Encounter       CHIEF COMPLAINT       Chief Complaint   Patient presents with    Back Pain       Nurses Notes reviewed and I agree except as noted in the HPI.  HISTORY OF PRESENT ILLNESS   Chica Samuel is a 69 y.o. female who presents to [facility] urgent care for evaluation of acute exacerbation of her chronic back pain.  Patient reporting that she believes lifting boxes at work is what caused the flareup.  Patient reporting that her provider that typically takes care of this is out of town for Thanksgiving.  She reports that she is going to have an appointment next week with him.  She typically gets a cortisone injection at the base of her back.  Patient denies any numbness or tingling.  Denies any new or different pain.  Denies any loss of bowel or bladder.  Denies inability to walk due to numbness or neurological deficit.  Reports increased pain with twisting and bending and walking.    The history is provided by the patient. No  was used.       REVIEW OF SYSTEMS     Review of Systems   Constitutional:  Negative for activity change, appetite change, chills, fatigue, fever and unexpected weight change.   HENT:  Negative for congestion, ear pain, hearing loss, mouth sores, nosebleeds, rhinorrhea, sinus pain, sneezing, sore throat, tinnitus and trouble swallowing.    Eyes:  Negative for photophobia, pain and visual disturbance.   Respiratory:  Negative for apnea, cough, chest tightness, shortness of breath and wheezing.    Cardiovascular:  Negative for chest pain and palpitations.   Gastrointestinal:  Negative for abdominal pain, constipation, diarrhea, nausea and vomiting.   Endocrine: Negative for cold intolerance, heat intolerance, polydipsia, polyphagia and polyuria.   Genitourinary:  Negative for difficulty urinating, dysuria, flank pain, frequency, hematuria, menstrual problem and urgency.   Musculoskeletal:  Positive for back pain and gait

## 2024-11-25 NOTE — ED TRIAGE NOTES
Pt to uc with c/o chronic back pain. Pt reports it has been flared up since Thursday. Pt reports the back pain is relieved when her muscle relaxer's. Pt reports motrin today.

## 2024-11-25 NOTE — DISCHARGE INSTRUCTIONS
I sent in prednisone for you.  You received a shot of steroid today, which is in your system for 72 hours.  If it is ready today, do not start until tomorrow.     I sent in lodine for you.  You have celebrex on your medication list.  You can take that or the lodine for your pain.     I also sent in flexeril for you to help with muscle spasms.  I prescribed 5 mg, you can cut it in half and try half a dose at a time to see if you have the symptoms from the other one.     If you develop any numbness/tingling, loss of bowel or bladder, or worsening condition, I recommend you go to the ER.    Take magnesium and potassium until completed  See PCP 1 week

## 2024-11-30 RX ORDER — ETODOLAC 200 MG/1
CAPSULE ORAL
Qty: 18 CAPSULE | Refills: 0 | OUTPATIENT
Start: 2024-11-30

## 2024-12-30 ENCOUNTER — TELEPHONE (OUTPATIENT)
Dept: NEUROLOGY | Age: 69
End: 2024-12-30

## 2024-12-30 NOTE — TELEPHONE ENCOUNTER
Patient called office stating she is unable to complete MRI due to claustrophobia (even open MRI at [facility]). Medication did not provide enough relief prior to testing.   Patient is wanting to know if there is any alternative testing the provider would like to order.  Please advise.

## 2024-12-30 NOTE — TELEPHONE ENCOUNTER
Patient advised of provider response. Patient will contact office is she would decide to try to complete MRI again.   No additional questions at this time.

## 2025-01-06 ENCOUNTER — TELEPHONE (OUTPATIENT)
Dept: FAMILY MEDICINE CLINIC | Age: 70
End: 2025-01-06

## 2025-01-06 NOTE — TELEPHONE ENCOUNTER
Patient called in regarding Wegovy. Stated with her new coverage with [employer], a PA is required or it will cost her $760/3 months.This nurse explained once we receive the PA request, we will send it through. She stated she will call back later this week for an update.

## 2025-01-09 NOTE — TELEPHONE ENCOUNTER
Spoke to patient in regards to above. Patient aware that we have not received a PA request from the pharmacy. She is going to call pharmacy to update.

## 2025-01-13 ENCOUNTER — TELEPHONE (OUTPATIENT)
Dept: FAMILY MEDICINE CLINIC | Age: 70
End: 2025-01-13

## 2025-01-13 NOTE — TELEPHONE ENCOUNTER
Wondering if until she meets her deductible for  Wegovy, if she could try Adipex. Patient uses [facility] on [address]. Please advise.

## 2025-01-21 DIAGNOSIS — B00.1 COLD SORE: ICD-10-CM

## 2025-01-21 DIAGNOSIS — R11.0 NAUSEA: ICD-10-CM

## 2025-01-21 RX ORDER — VALACYCLOVIR HYDROCHLORIDE 1 G/1
TABLET, FILM COATED ORAL
Qty: 20 TABLET | Refills: 2 | Status: SHIPPED | OUTPATIENT
Start: 2025-01-21

## 2025-01-21 RX ORDER — ONDANSETRON 4 MG/1
TABLET, ORALLY DISINTEGRATING ORAL
Qty: 30 TABLET | Refills: 0 | Status: SHIPPED | OUTPATIENT
Start: 2025-01-21

## 2025-01-21 NOTE — TELEPHONE ENCOUNTER
Pt called office requesting a refill of the Zofran and Valtrex to [facility]. If no call back she will check with the pharmacy after 6pm.

## 2025-02-27 ENCOUNTER — TELEPHONE (OUTPATIENT)
Dept: FAMILY MEDICINE CLINIC | Age: 70
End: 2025-02-27

## 2025-02-27 RX ORDER — SEMAGLUTIDE 2.4 MG/.75ML
2.4 INJECTION, SOLUTION SUBCUTANEOUS
Qty: 3 ML | Refills: 0 | Status: SHIPPED | OUTPATIENT
Start: 2025-02-27

## 2025-02-27 NOTE — TELEPHONE ENCOUNTER
Pt called office requesting a refill of the Wegovy. Pt also requests an increased dose. Pt is now using [facility] and will check with them after 4pm.

## 2025-03-03 RX ORDER — SEMAGLUTIDE 1.7 MG/.75ML
INJECTION, SOLUTION SUBCUTANEOUS
Qty: 3 ML | Refills: 2 | Status: SHIPPED | OUTPATIENT
Start: 2025-03-03

## 2025-03-17 DIAGNOSIS — R11.0 NAUSEA: ICD-10-CM

## 2025-03-17 RX ORDER — ONDANSETRON 4 MG/1
TABLET, ORALLY DISINTEGRATING ORAL
Qty: 30 TABLET | Refills: 0 | Status: SHIPPED | OUTPATIENT
Start: 2025-03-17

## 2025-03-18 RX ORDER — HYDROXYZINE HYDROCHLORIDE 25 MG/1
25 TABLET, FILM COATED ORAL NIGHTLY
Qty: 30 TABLET | Refills: 0 | Status: SHIPPED | OUTPATIENT
Start: 2025-03-18

## 2025-03-31 ENCOUNTER — OFFICE VISIT (OUTPATIENT)
Dept: FAMILY MEDICINE CLINIC | Age: 70
End: 2025-03-31
Payer: MEDICARE

## 2025-03-31 VITALS
DIASTOLIC BLOOD PRESSURE: 80 MMHG | SYSTOLIC BLOOD PRESSURE: 124 MMHG | WEIGHT: 242.6 LBS | HEART RATE: 68 BPM | BODY MASS INDEX: 41.42 KG/M2 | RESPIRATION RATE: 16 BRPM | HEIGHT: 64 IN

## 2025-03-31 DIAGNOSIS — L98.0 PYOGENIC GRANULOMA OF SKIN: ICD-10-CM

## 2025-03-31 DIAGNOSIS — R73.01 IFG (IMPAIRED FASTING GLUCOSE): ICD-10-CM

## 2025-03-31 DIAGNOSIS — E78.00 PURE HYPERCHOLESTEROLEMIA: ICD-10-CM

## 2025-03-31 DIAGNOSIS — G47.00 INSOMNIA, UNSPECIFIED TYPE: ICD-10-CM

## 2025-03-31 DIAGNOSIS — Z00.00 MEDICARE ANNUAL WELLNESS VISIT, SUBSEQUENT: Primary | ICD-10-CM

## 2025-03-31 DIAGNOSIS — E66.01 MORBID OBESITY WITH BMI OF 40.0-44.9, ADULT: ICD-10-CM

## 2025-03-31 DIAGNOSIS — I25.10 CORONARY ARTERY DISEASE INVOLVING NATIVE CORONARY ARTERY OF NATIVE HEART WITHOUT ANGINA PECTORIS: ICD-10-CM

## 2025-03-31 DIAGNOSIS — I10 HYPERTENSION, UNSPECIFIED TYPE: ICD-10-CM

## 2025-03-31 DIAGNOSIS — R25.2 MUSCLE CRAMPS: ICD-10-CM

## 2025-03-31 PROCEDURE — 3017F COLORECTAL CA SCREEN DOC REV: CPT | Performed by: FAMILY MEDICINE

## 2025-03-31 PROCEDURE — 1090F PRES/ABSN URINE INCON ASSESS: CPT | Performed by: FAMILY MEDICINE

## 2025-03-31 PROCEDURE — 99214 OFFICE O/P EST MOD 30 MIN: CPT | Performed by: FAMILY MEDICINE

## 2025-03-31 PROCEDURE — 1124F ACP DISCUSS-NO DSCNMKR DOCD: CPT | Performed by: FAMILY MEDICINE

## 2025-03-31 PROCEDURE — G0439 PPPS, SUBSEQ VISIT: HCPCS | Performed by: FAMILY MEDICINE

## 2025-03-31 PROCEDURE — 3074F SYST BP LT 130 MM HG: CPT | Performed by: FAMILY MEDICINE

## 2025-03-31 PROCEDURE — G8399 PT W/DXA RESULTS DOCUMENT: HCPCS | Performed by: FAMILY MEDICINE

## 2025-03-31 PROCEDURE — G8417 CALC BMI ABV UP PARAM F/U: HCPCS | Performed by: FAMILY MEDICINE

## 2025-03-31 PROCEDURE — G8427 DOCREV CUR MEDS BY ELIG CLIN: HCPCS | Performed by: FAMILY MEDICINE

## 2025-03-31 PROCEDURE — 1159F MED LIST DOCD IN RCRD: CPT | Performed by: FAMILY MEDICINE

## 2025-03-31 PROCEDURE — 1036F TOBACCO NON-USER: CPT | Performed by: FAMILY MEDICINE

## 2025-03-31 PROCEDURE — 3079F DIAST BP 80-89 MM HG: CPT | Performed by: FAMILY MEDICINE

## 2025-03-31 RX ORDER — TIRZEPATIDE 2.5 MG/.5ML
2.5 INJECTION, SOLUTION SUBCUTANEOUS WEEKLY
Qty: 2 ML | Refills: 0 | Status: SHIPPED | OUTPATIENT
Start: 2025-03-31

## 2025-03-31 SDOH — HEALTH STABILITY: PHYSICAL HEALTH: ON AVERAGE, HOW MANY DAYS PER WEEK DO YOU ENGAGE IN MODERATE TO STRENUOUS EXERCISE (LIKE A BRISK WALK)?: 2 DAYS

## 2025-03-31 SDOH — ECONOMIC STABILITY: FOOD INSECURITY: WITHIN THE PAST 12 MONTHS, THE FOOD YOU BOUGHT JUST DIDN'T LAST AND YOU DIDN'T HAVE MONEY TO GET MORE.: NEVER TRUE

## 2025-03-31 SDOH — ECONOMIC STABILITY: INCOME INSECURITY: IN THE LAST 12 MONTHS, WAS THERE A TIME WHEN YOU WERE NOT ABLE TO PAY THE MORTGAGE OR RENT ON TIME?: NO

## 2025-03-31 SDOH — ECONOMIC STABILITY: FOOD INSECURITY: WITHIN THE PAST 12 MONTHS, YOU WORRIED THAT YOUR FOOD WOULD RUN OUT BEFORE YOU GOT MONEY TO BUY MORE.: NEVER TRUE

## 2025-03-31 ASSESSMENT — LIFESTYLE VARIABLES
HOW OFTEN DO YOU HAVE A DRINK CONTAINING ALCOHOL: 1
HOW OFTEN DO YOU HAVE SIX OR MORE DRINKS ON ONE OCCASION: 1
HOW MANY STANDARD DRINKS CONTAINING ALCOHOL DO YOU HAVE ON A TYPICAL DAY: PATIENT DOES NOT DRINK
HOW OFTEN DO YOU HAVE A DRINK CONTAINING ALCOHOL: NEVER
HOW MANY STANDARD DRINKS CONTAINING ALCOHOL DO YOU HAVE ON A TYPICAL DAY: 0

## 2025-03-31 ASSESSMENT — ENCOUNTER SYMPTOMS
GASTROINTESTINAL NEGATIVE: 1
RESPIRATORY NEGATIVE: 1

## 2025-03-31 ASSESSMENT — PATIENT HEALTH QUESTIONNAIRE - PHQ9
2. FEELING DOWN, DEPRESSED OR HOPELESS: NOT AT ALL
SUM OF ALL RESPONSES TO PHQ QUESTIONS 1-9: 0
1. LITTLE INTEREST OR PLEASURE IN DOING THINGS: NOT AT ALL

## 2025-03-31 NOTE — PROGRESS NOTES
Referral faxed to Dr Vladimir Greenwood's office.  Patient will call and schedule appt.  
Comprehensive Metabolic Panel; Future  -     Hemoglobin A1C; Future  Insomnia, unspecified type  Morbid obesity with BMI of 40.0-44.9, adult  -     ZEPBOUND 2.5 MG/0.5ML SOAJ subCUTAneous auto-injector pen; Inject 2.5 mg into the skin once a week, Disp-2 mL, R-0, DAWNormal     Return in about 3 months (around 6/30/2025) for Zepbound follow up.     Subjective       Patient's complete Health Risk Assessment and screening values have been reviewed and are found in Flowsheets. The following problems were reviewed today and where indicated follow up appointments were made and/or referrals ordered.    Positive Risk Factor Screenings with Interventions:              Inactivity:  On average, how many days per week do you engage in moderate to strenuous exercise (like a brisk walk)?: (Patient-Rptd) 2 days (!) Abnormal  On average, how many minutes do you engage in exercise at this level?: 10 min  Interventions:  See AVS for additional education material    Poor Eating Habits/Diet:  Do you eat balanced/healthy meals regularly?: (!) (Patient-Rptd) No  Interventions:  See AVS for additional education material    Abnormal BMI (obese):  Body mass index is 42.3 kg/m². (!) Abnormal  Interventions:  See AVS for additional education material          Vision Screen:  Do you have difficulty driving, watching TV, or doing any of your daily activities because of your eyesight?: (Patient-Rptd) No  Have you had an eye exam within the past year?: (!) (Patient-Rptd) No  Interventions:   Patient encouraged to make appointment with their eye specialist     ADL's:   Patient reports needing help with:  Select all that apply: (!) (Patient-Rptd) Shopping, Food Preparation  Interventions:  See AVS for additional education material    Advanced Directives:  Do you have a Living Will?: (!) (Patient-Rptd) No    Intervention:  has NO advanced directive - not interested in additional information                     Objective   Vitals:    03/31/25 1444

## 2025-03-31 NOTE — PATIENT INSTRUCTIONS
You may receive a survey about your visit with us today. The feedback from our patients helps us identify what is working well and where the service to all patients can be enhanced. Thank you!        Learning About Being Active as an Older Adult  Why is being active important as you get older?     Being active is one of the best things you can do for your health. And it's never too late to start. Being active--or getting active, if you aren't already--has definite benefits. It can:  Give you more energy,  Keep your mind sharp.  Improve balance to reduce your risk of falls.  Help you manage chronic illness with fewer medicines.  No matter how old you are, how fit you are, or what health problems you have, there is a form of activity that will work for you. And the more physical activity you can do, the better your overall health will be.  What kinds of activity can help you stay healthy?  Being more active will make your daily activities easier. Physical activity includes planned exercise and things you do in daily life. There are four types of activity:  Aerobic.  Doing aerobic activity makes your heart and lungs strong.  Includes walking, dancing, and gardening.  Aim for at least 2½ hours spread throughout the week.  It improves your energy and can help you sleep better.  Muscle-strengthening.  This type of activity can help maintain muscle and strengthen bones.  Includes climbing stairs, using resistance bands, and lifting or carrying heavy loads.  Aim for at least twice a week.  It can help protect the knees and other joints.  Stretching.  Stretching gives you better range of motion in joints and muscles.  Includes upper arm stretches, calf stretches, and gentle yoga.  Aim for at least twice a week, preferably after your muscles are warmed up from other activities.  It can help you function better in daily life.  Balancing.  This helps you stay coordinated and have good posture.  Includes heel-to-toe walking, makenna

## 2025-04-02 ENCOUNTER — RESULTS FOLLOW-UP (OUTPATIENT)
Dept: FAMILY MEDICINE CLINIC | Age: 70
End: 2025-04-02

## 2025-04-02 LAB
ALBUMIN: 4 G/DL (ref 3.5–5.2)
ALK PHOSPHATASE: 91 U/L (ref 30–146)
ALT SERPL-CCNC: 14 U/L (ref 5–33)
ANION GAP SERPL CALCULATED.3IONS-SCNC: 13 MMOL/L (ref 7–16)
AST SERPL-CCNC: 17 U/L (ref 9–40)
BASOPHILS ABSOLUTE: 0.07 K/UL (ref 0–0.2)
BASOPHILS RELATIVE PERCENT: 1 % (ref 0–2)
BILIRUB SERPL-MCNC: 0.4 MG/DL
BUN BLDV-MCNC: 23 MG/DL (ref 8–23)
CALCIUM SERPL-MCNC: 9.2 MG/DL (ref 8.6–10.5)
CHLORIDE BLD-SCNC: 108 MMOL/L (ref 96–107)
CHOLESTEROL, TOTAL: 201 MG/DL (ref 100–199)
CHOLESTEROL/HDL RATIO: 3.7 (ref 2–4.5)
CO2: 21 MMOL/L (ref 18–32)
CREAT SERPL-MCNC: 1.03 MG/DL (ref 0.51–1.15)
EGFR IF NONAFRICAN AMERICAN: 58 ML/MIN/1.73M2
EOSINOPHILS ABSOLUTE: 0.34 K/UL (ref 0–0.8)
EOSINOPHILS RELATIVE PERCENT: 5.1 % (ref 0–5)
GLUCOSE: 84 MG/DL (ref 70–100)
HBA1C MFR BLD: 5.2 %
HCT VFR BLD CALC: 41.1 % (ref 35–47)
HDLC SERPL-MCNC: 55 MG/DL
HEMOGLOBIN: 13.6 G/DL (ref 11.9–16)
IMMATURE GRANS (ABS): 0 K/UL (ref 0–0.06)
IMMATURE GRANULOCYTES %: 0 % (ref 0–2)
LDL CHOLESTEROL: 119 MG/DL
LDL/HDL RATIO: 2.2
LYMPHOCYTES ABSOLUTE: 2.92 K/UL (ref 0.9–5.2)
LYMPHOCYTES RELATIVE PERCENT: 43.6 % (ref 20–45)
MAGNESIUM: 2.1 MG/DL (ref 1.6–2.6)
MCH RBC QN AUTO: 29.7 PG (ref 26–33)
MCHC RBC AUTO-ENTMCNC: 33.1 G/DL (ref 32–35)
MCV RBC AUTO: 90 FL (ref 75–100)
MONOCYTES ABSOLUTE: 0.47 K/UL (ref 0.1–1)
MONOCYTES RELATIVE PERCENT: 7 % (ref 0–13)
NEUTROPHILS ABSOLUTE: 2.89 K/UL (ref 1.9–8)
NEUTROPHILS RELATIVE PERCENT: 43.3 % (ref 45–75)
PDW BLD-RTO: 13.1 % (ref 11.2–14.8)
PLATELET # BLD: 356 THOUS/CMM (ref 140–440)
POTASSIUM SERPL-SCNC: 4.1 MMOL/L (ref 3.5–5.4)
RBC # BLD: 4.58 MILL/CMM (ref 3.8–5.2)
SODIUM BLD-SCNC: 142 MMOL/L (ref 135–148)
TOTAL PROTEIN: 6.6 G/DL (ref 6–8.3)
TRIGL SERPL-MCNC: 137 MG/DL (ref 20–149)
TSH SERPL DL<=0.05 MIU/L-ACNC: 2.63 UIU/ML (ref 0.27–4.2)
VLDLC SERPL CALC-MCNC: 27 MG/DL
WBC # BLD: 6.7 THDS/CMM (ref 3.6–11)

## 2025-04-03 ENCOUNTER — TELEPHONE (OUTPATIENT)
Dept: FAMILY MEDICINE CLINIC | Age: 70
End: 2025-04-03

## 2025-04-03 NOTE — TELEPHONE ENCOUNTER
PA request received from pharmacy for Zepbound 2.5mg/0.5ml #2ml for 28 days RUSSELL.  PA submitted online at [email or web address] and pending review.

## 2025-04-03 NOTE — TELEPHONE ENCOUNTER
Patient calling due to still waiting on Zepbound and was told by pharmacy we need to submit a PA to her insurance.  Aware no PA request was received from pharmacy to notify us of that.  We will work on PA and call patient once processed.

## 2025-04-07 ENCOUNTER — TELEPHONE (OUTPATIENT)
Dept: FAMILY MEDICINE CLINIC | Age: 70
End: 2025-04-07

## 2025-04-07 NOTE — TELEPHONE ENCOUNTER
Message from Plan  We did not find support for use, in your situation. This drug is to be used to help lose weight. Medicare says that drugs used for weight loss are excluded from Part D coverage. Medicare did release a josefina on March 20, 2024. This stated that anti-obesity medications remain excluded from Medicare Part D coverage. But, if that same drug received approval from the FDA for an additional medically accepted indication, then it could be considered a Part D drug- for that specific use only. This is not the case for your diagnosis. Therefore, we are unable to approve coverage under your Part D benefit. Please note: Zepbound is FDA approved to treat moderate to severe obstructive sleep apnea (ALBERTO) in adults with obesity. Learn more in your Evidence of Coverage booklet. Look under- 'What types of drugs are not covered by the plan'. Also see the Prescription Drug Benefit Manual, Chapter 6 Section 20.1. You can also reference the Medicare Josefina titled Part D Coverage of Anti-Obesity Medications with Medically Accepted Indications online.

## 2025-04-07 NOTE — TELEPHONE ENCOUNTER
The patient called in and stated that she was notified that her insurance will not cover Zepbound.  She is requesting to start back on Wegovy with an increase in her dose if able and send it to [facility] [address].  Please advise.      If no call back the patient will check with her pharmacy after 4pm.

## 2025-04-10 ENCOUNTER — TELEPHONE (OUTPATIENT)
Dept: NEUROLOGY | Age: 70
End: 2025-04-10
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
## 2025-04-22 DIAGNOSIS — R11.0 NAUSEA: ICD-10-CM

## 2025-04-22 RX ORDER — ONDANSETRON 4 MG/1
TABLET, ORALLY DISINTEGRATING ORAL
Qty: 30 TABLET | Refills: 2 | Status: SHIPPED | OUTPATIENT
Start: 2025-04-22

## 2025-04-22 NOTE — TELEPHONE ENCOUNTER
Patient called in requesting a new prescription of ondansetron. Script pending if appropriate. Wondered if you were able to send in refills on this or is this just a 30 day prescription? Please advise.

## 2025-05-01 ENCOUNTER — TELEPHONE (OUTPATIENT)
Dept: FAMILY MEDICINE CLINIC | Age: 70
End: 2025-05-01

## 2025-05-01 NOTE — TELEPHONE ENCOUNTER
Patient called and would like a new prescription for Wegovy. She stated that she is doing well on the medication. She stated that she is do for a refill but she would like to increase her dose she doesn't feel like it is working completely.     She will check with [facility] pharmacy tomorrow morning.

## 2025-05-08 ENCOUNTER — OFFICE VISIT (OUTPATIENT)
Dept: FAMILY MEDICINE CLINIC | Age: 70
End: 2025-05-08
Payer: MEDICARE

## 2025-05-08 VITALS
DIASTOLIC BLOOD PRESSURE: 80 MMHG | SYSTOLIC BLOOD PRESSURE: 124 MMHG | WEIGHT: 243.9 LBS | HEART RATE: 68 BPM | RESPIRATION RATE: 16 BRPM | BODY MASS INDEX: 42.53 KG/M2

## 2025-05-08 DIAGNOSIS — M17.12 OSTEOARTHRITIS OF LEFT KNEE, UNSPECIFIED OSTEOARTHRITIS TYPE: Primary | ICD-10-CM

## 2025-05-08 DIAGNOSIS — R56.9 OBSERVED SEIZURE-LIKE ACTIVITY (HCC): ICD-10-CM

## 2025-05-08 DIAGNOSIS — F40.240 CLAUSTROPHOBIA: ICD-10-CM

## 2025-05-08 PROCEDURE — 1159F MED LIST DOCD IN RCRD: CPT | Performed by: FAMILY MEDICINE

## 2025-05-08 PROCEDURE — 1090F PRES/ABSN URINE INCON ASSESS: CPT | Performed by: FAMILY MEDICINE

## 2025-05-08 PROCEDURE — G8399 PT W/DXA RESULTS DOCUMENT: HCPCS | Performed by: FAMILY MEDICINE

## 2025-05-08 PROCEDURE — 3017F COLORECTAL CA SCREEN DOC REV: CPT | Performed by: FAMILY MEDICINE

## 2025-05-08 PROCEDURE — 1124F ACP DISCUSS-NO DSCNMKR DOCD: CPT | Performed by: FAMILY MEDICINE

## 2025-05-08 PROCEDURE — 1036F TOBACCO NON-USER: CPT | Performed by: FAMILY MEDICINE

## 2025-05-08 PROCEDURE — G8417 CALC BMI ABV UP PARAM F/U: HCPCS | Performed by: FAMILY MEDICINE

## 2025-05-08 PROCEDURE — G8427 DOCREV CUR MEDS BY ELIG CLIN: HCPCS | Performed by: FAMILY MEDICINE

## 2025-05-08 PROCEDURE — 99214 OFFICE O/P EST MOD 30 MIN: CPT | Performed by: FAMILY MEDICINE

## 2025-05-08 RX ORDER — IBUPROFEN 600 MG/1
600 TABLET, FILM COATED ORAL EVERY 6 HOURS PRN
Qty: 120 TABLET | Refills: 0 | Status: SHIPPED | OUTPATIENT
Start: 2025-05-08

## 2025-05-08 RX ORDER — DIAZEPAM 5 MG/1
TABLET ORAL
Qty: 1 TABLET | Refills: 0 | Status: SHIPPED | OUTPATIENT
Start: 2025-05-08 | End: 2025-05-08

## 2025-05-08 RX ORDER — TRAMADOL HYDROCHLORIDE 50 MG/1
50 TABLET ORAL EVERY 8 HOURS PRN
Qty: 21 TABLET | Refills: 0 | Status: SHIPPED | OUTPATIENT
Start: 2025-05-08 | End: 2025-05-15

## 2025-05-08 ASSESSMENT — ENCOUNTER SYMPTOMS
RESPIRATORY NEGATIVE: 1
GASTROINTESTINAL NEGATIVE: 1

## 2025-05-08 NOTE — PROGRESS NOTES
Chica Samuel (:  1955) is a 70 y.o. female,Established patient, here for evaluation of the following chief complaint(s):  Knee Pain (Left.  Knee replacement scheduled for end of July in [place].   Requesting pain medication until surgery) and Medication Refill          Subjective   SUBJECTIVE/OBJECTIVE:  HPI  Chief Complaint   Patient presents with    Knee Pain     Left.  Knee replacement scheduled for end of July in [place].   Requesting pain medication until surgery    Medication Refill     Pt presents today with c/o left knee pain that is chronic in nature.      Plan is to get a replacement but Ortho will not do it until she has a brain MRI due to hx of previous seizure activity.    Neuro ordered MRI.    Very claustrophobic and does not think she can do it.      Patient Active Problem List   Diagnosis    CAD (coronary artery disease) 2014- mid lad 49 % patent LM< LCX and RCA    Hyperlipidemia LDL goal <70    Hypothyroid    History of total thyroidectomy    GERD (gastroesophageal reflux disease)    PUD (peptic ulcer disease)    IBS (irritable bowel syndrome)    IFG (impaired fasting glucose)    Vitamin D deficiency    SOB (shortness of breath) on exertion    Chest pain    Heart palpitations- intermittent    Abnormal nuclear stress test- anteroior and anteroseptal ischemia    Chest pain, atypical    Abdominal pain    Stress incontinence (female) (male)    Abnormal cardiovascular stress test    GOLDEN (dyspnea on exertion)    Arthritis of left hip    Degeneration of lumbosacral intervertebral disc    Pain, unspecified    Trochanteric bursitis of right hip    Unspecified infectious disease    Unilateral primary osteoarthritis, right hip    Abnormal stress test    New onset seizure (HCC)    Lumbar radiculopathy       Current Outpatient Medications   Medication Sig Dispense Refill    ibuprofen (IBU) 600 MG tablet Take 1 tablet by mouth every 6 hours as needed for Pain 120 tablet 0    diazePAM (VALIUM) 5

## 2025-06-09 ENCOUNTER — OFFICE VISIT (OUTPATIENT)
Dept: FAMILY MEDICINE CLINIC | Age: 70
End: 2025-06-09
Payer: MEDICARE

## 2025-06-09 VITALS
DIASTOLIC BLOOD PRESSURE: 74 MMHG | RESPIRATION RATE: 16 BRPM | SYSTOLIC BLOOD PRESSURE: 118 MMHG | BODY MASS INDEX: 41.34 KG/M2 | WEIGHT: 237.1 LBS | HEART RATE: 72 BPM

## 2025-06-09 DIAGNOSIS — M17.12 OSTEOARTHRITIS OF LEFT KNEE, UNSPECIFIED OSTEOARTHRITIS TYPE: ICD-10-CM

## 2025-06-09 DIAGNOSIS — I10 HYPERTENSION, UNSPECIFIED TYPE: ICD-10-CM

## 2025-06-09 DIAGNOSIS — B00.1 COLD SORE: ICD-10-CM

## 2025-06-09 DIAGNOSIS — M79.674 GREAT TOE PAIN, RIGHT: Primary | ICD-10-CM

## 2025-06-09 PROCEDURE — G8399 PT W/DXA RESULTS DOCUMENT: HCPCS | Performed by: FAMILY MEDICINE

## 2025-06-09 PROCEDURE — 3074F SYST BP LT 130 MM HG: CPT | Performed by: FAMILY MEDICINE

## 2025-06-09 PROCEDURE — 3078F DIAST BP <80 MM HG: CPT | Performed by: FAMILY MEDICINE

## 2025-06-09 PROCEDURE — 1036F TOBACCO NON-USER: CPT | Performed by: FAMILY MEDICINE

## 2025-06-09 PROCEDURE — G2211 COMPLEX E/M VISIT ADD ON: HCPCS | Performed by: FAMILY MEDICINE

## 2025-06-09 PROCEDURE — 1159F MED LIST DOCD IN RCRD: CPT | Performed by: FAMILY MEDICINE

## 2025-06-09 PROCEDURE — 1124F ACP DISCUSS-NO DSCNMKR DOCD: CPT | Performed by: FAMILY MEDICINE

## 2025-06-09 PROCEDURE — 3017F COLORECTAL CA SCREEN DOC REV: CPT | Performed by: FAMILY MEDICINE

## 2025-06-09 PROCEDURE — 99214 OFFICE O/P EST MOD 30 MIN: CPT | Performed by: FAMILY MEDICINE

## 2025-06-09 PROCEDURE — G8417 CALC BMI ABV UP PARAM F/U: HCPCS | Performed by: FAMILY MEDICINE

## 2025-06-09 PROCEDURE — 1090F PRES/ABSN URINE INCON ASSESS: CPT | Performed by: FAMILY MEDICINE

## 2025-06-09 PROCEDURE — G8427 DOCREV CUR MEDS BY ELIG CLIN: HCPCS | Performed by: FAMILY MEDICINE

## 2025-06-09 RX ORDER — TRAMADOL HYDROCHLORIDE 50 MG/1
50 TABLET ORAL DAILY PRN
Qty: 30 TABLET | Refills: 0 | Status: SHIPPED | OUTPATIENT
Start: 2025-06-09 | End: 2025-07-09

## 2025-06-09 RX ORDER — OLMESARTAN MEDOXOMIL 20 MG/1
20 TABLET ORAL DAILY
Qty: 90 TABLET | Refills: 3 | Status: SHIPPED | OUTPATIENT
Start: 2025-06-09

## 2025-06-09 RX ORDER — VALACYCLOVIR HYDROCHLORIDE 1 G/1
TABLET, FILM COATED ORAL
Qty: 20 TABLET | Refills: 2 | Status: SHIPPED | OUTPATIENT
Start: 2025-06-09

## 2025-06-09 RX ORDER — METHYLPREDNISOLONE 4 MG/1
TABLET ORAL
Qty: 1 KIT | Refills: 0 | Status: SHIPPED | OUTPATIENT
Start: 2025-06-09 | End: 2025-06-15

## 2025-06-09 NOTE — PROGRESS NOTES
valACYclovir (VALTREX) 1 g tablet; TAKE 2 TABLETS BY MOUTH TWICE DAILY AT ONSET OF COLD SORES FOR 1 DAY, Disp-20 tablet, R-2Normal  4. Osteoarthritis of left knee, unspecified osteoarthritis type  -     traMADol (ULTRAM) 50 MG tablet; Take 1 tablet by mouth daily as needed for Pain for up to 30 days. Max Daily Amount: 50 mg, Disp-30 tablet, R-0Normal    -  Chronic medical problems stable  -  Continue current medications, refills sent  -  Follow up with specialists as scheduled  -  Toe pain likely arthritic in  nature, does not appear to be acute gout  -  Trial MDP  -  Proper fitting shoes    Return if symptoms worsen or fail to improve.             An electronic signature was used to authenticate this note.    --Saad Villagran, DO

## 2025-06-10 ASSESSMENT — ENCOUNTER SYMPTOMS
RESPIRATORY NEGATIVE: 1
GASTROINTESTINAL NEGATIVE: 1

## 2025-07-04 DIAGNOSIS — M17.12 OSTEOARTHRITIS OF LEFT KNEE, UNSPECIFIED OSTEOARTHRITIS TYPE: ICD-10-CM

## 2025-07-07 RX ORDER — HYDROXYZINE HYDROCHLORIDE 25 MG/1
25 TABLET, FILM COATED ORAL NIGHTLY
Qty: 30 TABLET | Refills: 5 | Status: SHIPPED | OUTPATIENT
Start: 2025-07-07

## 2025-07-07 RX ORDER — TRAMADOL HYDROCHLORIDE 50 MG/1
50 TABLET ORAL DAILY PRN
Qty: 30 TABLET | Refills: 0 | Status: SHIPPED | OUTPATIENT
Start: 2025-07-07 | End: 2025-08-06

## 2025-07-13 DIAGNOSIS — K21.9 GASTROESOPHAGEAL REFLUX DISEASE, UNSPECIFIED WHETHER ESOPHAGITIS PRESENT: ICD-10-CM

## 2025-07-14 RX ORDER — PANTOPRAZOLE SODIUM 40 MG/1
40 TABLET, DELAYED RELEASE ORAL DAILY
Qty: 90 TABLET | Refills: 3 | Status: SHIPPED | OUTPATIENT
Start: 2025-07-14

## 2025-07-15 ENCOUNTER — TELEPHONE (OUTPATIENT)
Dept: FAMILY MEDICINE CLINIC | Age: 70
End: 2025-07-15

## 2025-07-15 DIAGNOSIS — M79.674 GREAT TOE PAIN, RIGHT: ICD-10-CM

## 2025-07-15 RX ORDER — METHYLPREDNISOLONE 4 MG/1
TABLET ORAL
Qty: 1 KIT | Refills: 0 | Status: SHIPPED | OUTPATIENT
Start: 2025-07-15 | End: 2025-07-21

## 2025-07-15 NOTE — TELEPHONE ENCOUNTER
Treated for back a couple of weeks ago. It slowed down with the medrol pack. 5 days were it was bearable. Now is back again. If patient sits, it hurts so bad she has to get up and walk around. Wanted to know if she could get another medrol dose pack or if she needs to be seen. Requests Daniella on [employer] if able to call something in. Aware provider is currently out of the office. Please advise.

## 2025-07-28 RX ORDER — FUROSEMIDE 20 MG/1
20 TABLET ORAL DAILY
Qty: 90 TABLET | Refills: 0 | Status: SHIPPED | OUTPATIENT
Start: 2025-07-28

## 2025-07-30 ENCOUNTER — TELEPHONE (OUTPATIENT)
Dept: CARDIOLOGY CLINIC | Age: 70
End: 2025-07-30

## 2025-08-06 DIAGNOSIS — M17.12 OSTEOARTHRITIS OF LEFT KNEE, UNSPECIFIED OSTEOARTHRITIS TYPE: ICD-10-CM

## 2025-08-06 RX ORDER — TRAMADOL HYDROCHLORIDE 50 MG/1
50 TABLET ORAL DAILY PRN
Qty: 30 TABLET | Refills: 0 | Status: SHIPPED | OUTPATIENT
Start: 2025-08-06 | End: 2025-08-07 | Stop reason: SDUPTHER

## 2025-08-07 ENCOUNTER — TELEPHONE (OUTPATIENT)
Dept: FAMILY MEDICINE CLINIC | Age: 70
End: 2025-08-07

## 2025-08-07 ENCOUNTER — OFFICE VISIT (OUTPATIENT)
Dept: FAMILY MEDICINE CLINIC | Age: 70
End: 2025-08-07
Payer: MEDICARE

## 2025-08-07 VITALS
BODY MASS INDEX: 42.67 KG/M2 | WEIGHT: 244.7 LBS | SYSTOLIC BLOOD PRESSURE: 130 MMHG | DIASTOLIC BLOOD PRESSURE: 74 MMHG | RESPIRATION RATE: 16 BRPM | HEART RATE: 68 BPM

## 2025-08-07 DIAGNOSIS — E78.5 HYPERLIPIDEMIA LDL GOAL <70: ICD-10-CM

## 2025-08-07 DIAGNOSIS — I25.10 CORONARY ARTERY DISEASE INVOLVING NATIVE CORONARY ARTERY OF NATIVE HEART WITHOUT ANGINA PECTORIS: ICD-10-CM

## 2025-08-07 DIAGNOSIS — I50.32 CHRONIC DIASTOLIC CONGESTIVE HEART FAILURE (HCC): ICD-10-CM

## 2025-08-07 DIAGNOSIS — E03.9 HYPOTHYROIDISM, UNSPECIFIED TYPE: ICD-10-CM

## 2025-08-07 DIAGNOSIS — Z01.818 PRE-OP EVALUATION: Primary | ICD-10-CM

## 2025-08-07 DIAGNOSIS — M17.12 OSTEOARTHRITIS OF LEFT KNEE, UNSPECIFIED OSTEOARTHRITIS TYPE: ICD-10-CM

## 2025-08-07 DIAGNOSIS — M54.16 LEFT LUMBAR RADICULITIS: ICD-10-CM

## 2025-08-07 DIAGNOSIS — E55.9 VITAMIN D DEFICIENCY: ICD-10-CM

## 2025-08-07 DIAGNOSIS — S39.012A ACUTE MYOFASCIAL STRAIN OF LUMBOSACRAL REGION, INITIAL ENCOUNTER: ICD-10-CM

## 2025-08-07 PROCEDURE — 1036F TOBACCO NON-USER: CPT | Performed by: FAMILY MEDICINE

## 2025-08-07 PROCEDURE — G8399 PT W/DXA RESULTS DOCUMENT: HCPCS | Performed by: FAMILY MEDICINE

## 2025-08-07 PROCEDURE — G8417 CALC BMI ABV UP PARAM F/U: HCPCS | Performed by: FAMILY MEDICINE

## 2025-08-07 PROCEDURE — 1090F PRES/ABSN URINE INCON ASSESS: CPT | Performed by: FAMILY MEDICINE

## 2025-08-07 PROCEDURE — 3017F COLORECTAL CA SCREEN DOC REV: CPT | Performed by: FAMILY MEDICINE

## 2025-08-07 PROCEDURE — 99214 OFFICE O/P EST MOD 30 MIN: CPT | Performed by: FAMILY MEDICINE

## 2025-08-07 PROCEDURE — G2211 COMPLEX E/M VISIT ADD ON: HCPCS | Performed by: FAMILY MEDICINE

## 2025-08-07 PROCEDURE — 1124F ACP DISCUSS-NO DSCNMKR DOCD: CPT | Performed by: FAMILY MEDICINE

## 2025-08-07 PROCEDURE — G8427 DOCREV CUR MEDS BY ELIG CLIN: HCPCS | Performed by: FAMILY MEDICINE

## 2025-08-07 RX ORDER — METHYLPREDNISOLONE 4 MG/1
TABLET ORAL
Qty: 1 KIT | Refills: 0 | Status: SHIPPED | OUTPATIENT
Start: 2025-08-07 | End: 2025-08-13

## 2025-08-07 RX ORDER — TRAMADOL HYDROCHLORIDE 50 MG/1
50 TABLET ORAL DAILY PRN
Qty: 30 TABLET | Refills: 0 | Status: SHIPPED | OUTPATIENT
Start: 2025-08-07 | End: 2025-09-06

## 2025-08-08 ENCOUNTER — OFFICE VISIT (OUTPATIENT)
Dept: CARDIOLOGY CLINIC | Age: 70
End: 2025-08-08
Payer: MEDICARE

## 2025-08-08 VITALS
DIASTOLIC BLOOD PRESSURE: 60 MMHG | BODY MASS INDEX: 43.73 KG/M2 | HEART RATE: 61 BPM | WEIGHT: 246.8 LBS | SYSTOLIC BLOOD PRESSURE: 121 MMHG | HEIGHT: 63 IN

## 2025-08-08 DIAGNOSIS — R00.2 PALPITATIONS: Primary | ICD-10-CM

## 2025-08-08 DIAGNOSIS — I25.10 CAD IN NATIVE ARTERY: ICD-10-CM

## 2025-08-08 DIAGNOSIS — R94.39 ABNORMAL STRESS TEST: ICD-10-CM

## 2025-08-08 PROCEDURE — 99214 OFFICE O/P EST MOD 30 MIN: CPT | Performed by: INTERNAL MEDICINE

## 2025-08-08 PROCEDURE — 3017F COLORECTAL CA SCREEN DOC REV: CPT | Performed by: INTERNAL MEDICINE

## 2025-08-08 PROCEDURE — G8399 PT W/DXA RESULTS DOCUMENT: HCPCS | Performed by: INTERNAL MEDICINE

## 2025-08-08 PROCEDURE — 1036F TOBACCO NON-USER: CPT | Performed by: INTERNAL MEDICINE

## 2025-08-08 PROCEDURE — 93000 ELECTROCARDIOGRAM COMPLETE: CPT | Performed by: INTERNAL MEDICINE

## 2025-08-08 PROCEDURE — 1159F MED LIST DOCD IN RCRD: CPT | Performed by: INTERNAL MEDICINE

## 2025-08-08 PROCEDURE — 1124F ACP DISCUSS-NO DSCNMKR DOCD: CPT | Performed by: INTERNAL MEDICINE

## 2025-08-08 PROCEDURE — G8417 CALC BMI ABV UP PARAM F/U: HCPCS | Performed by: INTERNAL MEDICINE

## 2025-08-08 PROCEDURE — 1090F PRES/ABSN URINE INCON ASSESS: CPT | Performed by: INTERNAL MEDICINE

## 2025-08-08 PROCEDURE — G8427 DOCREV CUR MEDS BY ELIG CLIN: HCPCS | Performed by: INTERNAL MEDICINE

## 2025-08-09 LAB
ESTIMATED AVERAGE GLUCOSE: NORMAL
HBA1C MFR BLD: 5.3 %

## 2025-08-13 PROBLEM — I50.32 CHRONIC DIASTOLIC CONGESTIVE HEART FAILURE (HCC): Status: ACTIVE | Noted: 2025-08-13

## 2025-08-13 ASSESSMENT — ENCOUNTER SYMPTOMS
GASTROINTESTINAL NEGATIVE: 1
BACK PAIN: 1
RESPIRATORY NEGATIVE: 1

## 2025-08-25 DIAGNOSIS — I10 HYPERTENSION, UNSPECIFIED TYPE: ICD-10-CM

## 2025-08-26 RX ORDER — OLMESARTAN MEDOXOMIL 20 MG/1
20 TABLET ORAL DAILY
Qty: 90 TABLET | Refills: 3 | Status: SHIPPED | OUTPATIENT
Start: 2025-08-26

## 2025-09-02 DIAGNOSIS — R11.0 NAUSEA: ICD-10-CM

## 2025-09-02 DIAGNOSIS — M17.12 OSTEOARTHRITIS OF LEFT KNEE, UNSPECIFIED OSTEOARTHRITIS TYPE: ICD-10-CM

## 2025-09-02 RX ORDER — CYCLOBENZAPRINE HCL 5 MG
5 TABLET ORAL 2 TIMES DAILY PRN
Qty: 30 TABLET | Refills: 0 | Status: SHIPPED | OUTPATIENT
Start: 2025-09-02

## 2025-09-02 RX ORDER — IBUPROFEN 600 MG/1
600 TABLET, FILM COATED ORAL EVERY 6 HOURS PRN
Qty: 120 TABLET | Refills: 0 | Status: SHIPPED | OUTPATIENT
Start: 2025-09-02

## 2025-09-02 RX ORDER — ONDANSETRON 4 MG/1
TABLET, ORALLY DISINTEGRATING ORAL
Qty: 30 TABLET | Refills: 2 | Status: SHIPPED | OUTPATIENT
Start: 2025-09-02

## 2025-09-02 RX ORDER — TRAMADOL HYDROCHLORIDE 50 MG/1
50 TABLET ORAL DAILY PRN
Qty: 30 TABLET | Refills: 0 | Status: SHIPPED | OUTPATIENT
Start: 2025-09-02 | End: 2025-10-02

## (undated) DEVICE — CATHETER 5FR JR4 MEDTRONIC 100CM

## (undated) DEVICE — TUBING RIGID ANGIO L10IN 1200PSI CNTRST INJ FIX FEM LUER CONN HI

## (undated) DEVICE — GLIDESHEATH SLENDER ACCESS KIT: Brand: GLIDESHEATH SLENDER

## (undated) DEVICE — DRAPE KIT RAMAPR RADIATION SHIELD

## (undated) DEVICE — KIT ANGIO W/ AT P65 PREM HND CTRL FOR CNTRST DEL ANGIOTOUCH

## (undated) DEVICE — CATHETER GUID 6FR L100CM DIA0.071IN NYL SHFT EBU3.5

## (undated) DEVICE — WIRE .035 3MM J TIP 260CM

## (undated) DEVICE — CARDIAC CATH LAB PACK LF

## (undated) DEVICE — TIBURON NEONATAL DRAPE: Brand: CONVERTORS

## (undated) DEVICE — KIT MFLD ISOLATN NACL CNTRST PRT TBNG SPIK W/ PRSS TRNSDUC

## (undated) DEVICE — DRESSING TRNSPAR W5XL4.5IN FLM SHT SEMIPERMEABLE WIND

## (undated) DEVICE — VALVE HEMSTAS W/ GWIRE INSRTN TOOL GRDIAN II NC

## (undated) DEVICE — PRESSURE GUIDEWIRE: Brand: COMET™ II